# Patient Record
Sex: FEMALE | Race: WHITE | Employment: UNEMPLOYED | URBAN - METROPOLITAN AREA
[De-identification: names, ages, dates, MRNs, and addresses within clinical notes are randomized per-mention and may not be internally consistent; named-entity substitution may affect disease eponyms.]

---

## 2017-12-26 ENCOUNTER — HOSPITAL ENCOUNTER (OUTPATIENT)
Dept: GENERAL RADIOLOGY | Age: 37
Discharge: HOME | End: 2017-12-26
Attending: PHYSICAL MEDICINE & REHABILITATION
Payer: MEDICAID

## 2017-12-26 DIAGNOSIS — M54.50 LUMBAGO: ICD-10-CM

## 2017-12-26 PROCEDURE — 73502 X-RAY EXAM HIP UNI 2-3 VIEWS: CPT

## 2017-12-26 PROCEDURE — 72110 X-RAY EXAM L-2 SPINE 4/>VWS: CPT

## 2021-05-20 ENCOUNTER — HOSPITAL ENCOUNTER (INPATIENT)
Age: 41
LOS: 5 days | Discharge: HOME HEALTH | DRG: 283 | End: 2021-05-25
Attending: HOSPITALIST | Admitting: FAMILY MEDICINE
Payer: MEDICAID

## 2021-05-20 DIAGNOSIS — B17.9 ACUTE HEPATITIS: ICD-10-CM

## 2021-05-20 DIAGNOSIS — B17.10 ACUTE HEPATITIS C VIRUS INFECTION WITHOUT HEPATIC COMA: ICD-10-CM

## 2021-05-20 DIAGNOSIS — R10.11 RIGHT UPPER QUADRANT ABDOMINAL PAIN: ICD-10-CM

## 2021-05-20 DIAGNOSIS — R17 JAUNDICE: ICD-10-CM

## 2021-05-20 DIAGNOSIS — R10.10 PAIN OF UPPER ABDOMEN: Primary | ICD-10-CM

## 2021-05-20 DIAGNOSIS — D68.9 COAGULOPATHY (HCC): ICD-10-CM

## 2021-05-20 PROCEDURE — 65660000000 HC RM CCU STEPDOWN

## 2021-05-20 RX ORDER — DEXTROAMPHETAMINE SACCHARATE, AMPHETAMINE ASPARTATE MONOHYDRATE, DEXTROAMPHETAMINE SULFATE AND AMPHETAMINE SULFATE 2.5; 2.5; 2.5; 2.5 MG/1; MG/1; MG/1; MG/1
10 CAPSULE, EXTENDED RELEASE ORAL DAILY
COMMUNITY

## 2021-05-20 RX ORDER — BUSPIRONE HYDROCHLORIDE 10 MG/1
10 TABLET ORAL 2 TIMES DAILY
COMMUNITY

## 2021-05-20 RX ORDER — TOPIRAMATE 50 MG/1
TABLET, FILM COATED ORAL
COMMUNITY

## 2021-05-20 RX ORDER — IBUPROFEN 800 MG/1
TABLET ORAL
COMMUNITY

## 2021-05-20 NOTE — PROGRESS NOTES
[place] [facility] TRANSFER - IN REPORT:    Verbal report received from Luis Pennington. on Heather Ace  being received from [facility] ED (unit) for routine progression of care      Report consisted of patients Situation, Background, Assessment and   Recommendations(SBAR). Information from the following report(s) SBAR, Kardex, ED Summary, Intake/Output, MAR and Recent Results was reviewed with the receiving nurse. Opportunity for questions and clarification was provided. Assessment completed upon patients arrival to unit and care assumed. Transport set up for 1800 to bring pt to 603.

## 2021-05-21 ENCOUNTER — APPOINTMENT (OUTPATIENT)
Dept: ULTRASOUND IMAGING | Age: 41
DRG: 283 | End: 2021-05-21
Attending: HOSPITALIST
Payer: MEDICAID

## 2021-05-21 PROBLEM — K72.90 LIVER FAILURE (HCC): Status: ACTIVE | Noted: 2021-05-21

## 2021-05-21 PROBLEM — E43 SEVERE PROTEIN-CALORIE MALNUTRITION (HCC): Status: ACTIVE | Noted: 2021-05-21

## 2021-05-21 LAB
ALBUMIN SERPL-MCNC: 2.1 G/DL (ref 3.5–5)
ALBUMIN/GLOB SERPL: 0.6 {RATIO} (ref 1.1–2.2)
ALP SERPL-CCNC: 146 U/L (ref 45–117)
ALT SERPL-CCNC: 3483 U/L (ref 12–78)
ANION GAP SERPL CALC-SCNC: 8 MMOL/L (ref 5–15)
APAP SERPL-MCNC: <2 UG/ML (ref 10–30)
AST SERPL-CCNC: >2000 U/L (ref 15–37)
ATRIAL RATE: 62 BPM
BASOPHILS # BLD: 0 K/UL (ref 0–0.1)
BASOPHILS # BLD: 0 K/UL (ref 0–0.1)
BASOPHILS NFR BLD: 0 % (ref 0–1)
BASOPHILS NFR BLD: 1 % (ref 0–1)
BILIRUB SERPL-MCNC: 11.4 MG/DL (ref 0.2–1)
BUN SERPL-MCNC: 8 MG/DL (ref 6–20)
BUN/CREAT SERPL: 10 (ref 12–20)
CALCIUM SERPL-MCNC: 8 MG/DL (ref 8.5–10.1)
CALCULATED P AXIS, ECG09: 21 DEGREES
CALCULATED R AXIS, ECG10: 47 DEGREES
CALCULATED T AXIS, ECG11: 39 DEGREES
CHLORIDE SERPL-SCNC: 104 MMOL/L (ref 97–108)
CO2 SERPL-SCNC: 25 MMOL/L (ref 21–32)
CREAT SERPL-MCNC: 0.82 MG/DL (ref 0.55–1.02)
DIAGNOSIS, 93000: NORMAL
DIFFERENTIAL METHOD BLD: ABNORMAL
DIFFERENTIAL METHOD BLD: ABNORMAL
EOSINOPHIL # BLD: 0 K/UL (ref 0–0.4)
EOSINOPHIL # BLD: 0.1 K/UL (ref 0–0.4)
EOSINOPHIL NFR BLD: 0 % (ref 0–7)
EOSINOPHIL NFR BLD: 3 % (ref 0–7)
ERYTHROCYTE [DISTWIDTH] IN BLOOD BY AUTOMATED COUNT: 13.9 % (ref 11.5–14.5)
ERYTHROCYTE [DISTWIDTH] IN BLOOD BY AUTOMATED COUNT: 14.9 % (ref 11.5–14.5)
GLOBULIN SER CALC-MCNC: 3.5 G/DL (ref 2–4)
GLUCOSE SERPL-MCNC: 59 MG/DL (ref 65–100)
HCT VFR BLD AUTO: 35.1 % (ref 35–47)
HCT VFR BLD AUTO: 37 % (ref 35–47)
HGB BLD-MCNC: 11.5 G/DL (ref 11.5–16)
HGB BLD-MCNC: 12.2 G/DL (ref 11.5–16)
IMM GRANULOCYTES # BLD AUTO: 0 K/UL
IMM GRANULOCYTES # BLD AUTO: 0 K/UL
IMM GRANULOCYTES NFR BLD AUTO: 0 %
IMM GRANULOCYTES NFR BLD AUTO: 0 %
INR PPP: 1.8 (ref 0.9–1.1)
LYMPHOCYTES # BLD: 0.6 K/UL (ref 0.8–3.5)
LYMPHOCYTES # BLD: 1.8 K/UL (ref 0.8–3.5)
LYMPHOCYTES NFR BLD: 15 % (ref 12–49)
LYMPHOCYTES NFR BLD: 43 % (ref 12–49)
MCH RBC QN AUTO: 30.6 PG (ref 26–34)
MCH RBC QN AUTO: 30.9 PG (ref 26–34)
MCHC RBC AUTO-ENTMCNC: 32.8 G/DL (ref 30–36.5)
MCHC RBC AUTO-ENTMCNC: 33 G/DL (ref 30–36.5)
MCV RBC AUTO: 92.7 FL (ref 80–99)
MCV RBC AUTO: 94.4 FL (ref 80–99)
MONOCYTES # BLD: 0.2 K/UL (ref 0–1)
MONOCYTES # BLD: 0.5 K/UL (ref 0–1)
MONOCYTES NFR BLD: 12 % (ref 5–13)
MONOCYTES NFR BLD: 5 % (ref 5–13)
NEUTS BAND NFR BLD MANUAL: 2 % (ref 0–6)
NEUTS BAND NFR BLD MANUAL: 6 % (ref 0–6)
NEUTS SEG # BLD: 1.6 K/UL (ref 1.8–8)
NEUTS SEG # BLD: 2.9 K/UL (ref 1.8–8)
NEUTS SEG NFR BLD: 35 % (ref 32–75)
NEUTS SEG NFR BLD: 78 % (ref 32–75)
NRBC # BLD: 0 K/UL (ref 0–0.01)
NRBC # BLD: 0 K/UL (ref 0–0.01)
NRBC BLD-RTO: 0 PER 100 WBC
NRBC BLD-RTO: 0 PER 100 WBC
P-R INTERVAL, ECG05: 168 MS
PLATELET # BLD AUTO: 157 K/UL (ref 150–400)
PLATELET # BLD AUTO: 160 K/UL (ref 150–400)
PMV BLD AUTO: 11.5 FL (ref 8.9–12.9)
PMV BLD AUTO: 11.6 FL (ref 8.9–12.9)
POTASSIUM SERPL-SCNC: 3.3 MMOL/L (ref 3.5–5.1)
PROT SERPL-MCNC: 5.6 G/DL (ref 6.4–8.2)
PROTHROMBIN TIME: 18.7 SEC (ref 9–11.1)
Q-T INTERVAL, ECG07: 480 MS
QRS DURATION, ECG06: 90 MS
QTC CALCULATION (BEZET), ECG08: 487 MS
RBC # BLD AUTO: 3.72 M/UL (ref 3.8–5.2)
RBC # BLD AUTO: 3.99 M/UL (ref 3.8–5.2)
RBC MORPH BLD: ABNORMAL
RBC MORPH BLD: ABNORMAL
SODIUM SERPL-SCNC: 137 MMOL/L (ref 136–145)
VENTRICULAR RATE, ECG03: 62 BPM
WBC # BLD AUTO: 3.7 K/UL (ref 3.6–11)
WBC # BLD AUTO: 4 K/UL (ref 3.6–11)

## 2021-05-21 PROCEDURE — 85025 COMPLETE CBC W/AUTO DIFF WBC: CPT

## 2021-05-21 PROCEDURE — 74011250636 HC RX REV CODE- 250/636: Performed by: HOSPITALIST

## 2021-05-21 PROCEDURE — 74011000258 HC RX REV CODE- 258: Performed by: HOSPITALIST

## 2021-05-21 PROCEDURE — 36415 COLL VENOUS BLD VENIPUNCTURE: CPT

## 2021-05-21 PROCEDURE — 76700 US EXAM ABDOM COMPLETE: CPT

## 2021-05-21 PROCEDURE — 80053 COMPREHEN METABOLIC PANEL: CPT

## 2021-05-21 PROCEDURE — 65660000000 HC RM CCU STEPDOWN

## 2021-05-21 PROCEDURE — 74011250637 HC RX REV CODE- 250/637: Performed by: HOSPITALIST

## 2021-05-21 PROCEDURE — 74011250637 HC RX REV CODE- 250/637: Performed by: NURSE PRACTITIONER

## 2021-05-21 PROCEDURE — 85610 PROTHROMBIN TIME: CPT

## 2021-05-21 PROCEDURE — 93005 ELECTROCARDIOGRAM TRACING: CPT

## 2021-05-21 PROCEDURE — 99223 1ST HOSP IP/OBS HIGH 75: CPT | Performed by: HOSPITALIST

## 2021-05-21 PROCEDURE — 74011250636 HC RX REV CODE- 250/636: Performed by: FAMILY MEDICINE

## 2021-05-21 PROCEDURE — P9047 ALBUMIN (HUMAN), 25%, 50ML: HCPCS | Performed by: HOSPITALIST

## 2021-05-21 PROCEDURE — 80143 DRUG ASSAY ACETAMINOPHEN: CPT

## 2021-05-21 RX ORDER — SODIUM CHLORIDE 0.9 % (FLUSH) 0.9 %
5-40 SYRINGE (ML) INJECTION EVERY 8 HOURS
Status: DISCONTINUED | OUTPATIENT
Start: 2021-05-21 | End: 2021-05-25 | Stop reason: HOSPADM

## 2021-05-21 RX ORDER — SODIUM CHLORIDE 0.9 % (FLUSH) 0.9 %
5-40 SYRINGE (ML) INJECTION AS NEEDED
Status: DISCONTINUED | OUTPATIENT
Start: 2021-05-21 | End: 2021-05-25 | Stop reason: HOSPADM

## 2021-05-21 RX ORDER — POTASSIUM CHLORIDE 750 MG/1
40 TABLET, FILM COATED, EXTENDED RELEASE ORAL
Status: COMPLETED | OUTPATIENT
Start: 2021-05-21 | End: 2021-05-21

## 2021-05-21 RX ORDER — ONDANSETRON 2 MG/ML
4 INJECTION INTRAMUSCULAR; INTRAVENOUS
Status: DISCONTINUED | OUTPATIENT
Start: 2021-05-21 | End: 2021-05-25 | Stop reason: HOSPADM

## 2021-05-21 RX ORDER — TRAMADOL HYDROCHLORIDE 50 MG/1
50 TABLET ORAL
Status: DISCONTINUED | OUTPATIENT
Start: 2021-05-21 | End: 2021-05-25 | Stop reason: HOSPADM

## 2021-05-21 RX ORDER — KETOROLAC TROMETHAMINE 30 MG/ML
30 INJECTION, SOLUTION INTRAMUSCULAR; INTRAVENOUS
Status: DISCONTINUED | OUTPATIENT
Start: 2021-05-21 | End: 2021-05-22

## 2021-05-21 RX ORDER — ALBUMIN HUMAN 250 G/1000ML
12.5 SOLUTION INTRAVENOUS EVERY 6 HOURS
Status: COMPLETED | OUTPATIENT
Start: 2021-05-21 | End: 2021-05-22

## 2021-05-21 RX ORDER — GABAPENTIN 400 MG/1
1200 CAPSULE ORAL 3 TIMES DAILY
Status: DISCONTINUED | OUTPATIENT
Start: 2021-05-21 | End: 2021-05-25 | Stop reason: HOSPADM

## 2021-05-21 RX ORDER — ONDANSETRON 4 MG/1
4 TABLET, ORALLY DISINTEGRATING ORAL
Status: DISCONTINUED | OUTPATIENT
Start: 2021-05-21 | End: 2021-05-25 | Stop reason: HOSPADM

## 2021-05-21 RX ORDER — TOPIRAMATE 25 MG/1
50 TABLET ORAL
Status: DISCONTINUED | OUTPATIENT
Start: 2021-05-21 | End: 2021-05-25 | Stop reason: HOSPADM

## 2021-05-21 RX ADMIN — PHYTONADIONE 10 MG: 10 INJECTION, EMULSION INTRAMUSCULAR; INTRAVENOUS; SUBCUTANEOUS at 10:05

## 2021-05-21 RX ADMIN — ALBUMIN (HUMAN) 12.5 G: 0.25 INJECTION, SOLUTION INTRAVENOUS at 12:15

## 2021-05-21 RX ADMIN — KETOROLAC TROMETHAMINE 30 MG: 30 INJECTION, SOLUTION INTRAMUSCULAR; INTRAVENOUS at 22:29

## 2021-05-21 RX ADMIN — KETOROLAC TROMETHAMINE 30 MG: 30 INJECTION, SOLUTION INTRAMUSCULAR; INTRAVENOUS at 10:05

## 2021-05-21 RX ADMIN — KETOROLAC TROMETHAMINE 30 MG: 30 INJECTION, SOLUTION INTRAMUSCULAR; INTRAVENOUS at 16:36

## 2021-05-21 RX ADMIN — ALBUMIN (HUMAN) 12.5 G: 0.25 INJECTION, SOLUTION INTRAVENOUS at 23:31

## 2021-05-21 RX ADMIN — ACETYLCYSTEINE 11180 MG: 200 INJECTION, SOLUTION INTRAVENOUS at 12:45

## 2021-05-21 RX ADMIN — ALBUMIN (HUMAN) 12.5 G: 0.25 INJECTION, SOLUTION INTRAVENOUS at 18:53

## 2021-05-21 RX ADMIN — GABAPENTIN 1200 MG: 400 CAPSULE ORAL at 21:24

## 2021-05-21 RX ADMIN — ONDANSETRON 4 MG: 2 INJECTION INTRAMUSCULAR; INTRAVENOUS at 04:20

## 2021-05-21 RX ADMIN — Medication 10 ML: at 21:25

## 2021-05-21 RX ADMIN — TOPIRAMATE 50 MG: 25 TABLET, FILM COATED ORAL at 21:25

## 2021-05-21 RX ADMIN — POTASSIUM CHLORIDE 40 MEQ: 750 TABLET, EXTENDED RELEASE ORAL at 16:01

## 2021-05-21 RX ADMIN — Medication 10 ML: at 16:02

## 2021-05-21 NOTE — PROGRESS NOTES
[address] [facility] Adult  Hospitalist Group                                                                                          Hospitalist Progress Note  Mj Morillo MD  Answering service: [phone] from in house phone        Date of Service:  2021  NAME:  Kadie Jara  :  1980  MRN:  [id number]      Admission Summary:   Kadie Jara is a 41 y.o. female with pmhx hep C cirrhosis, depression, GERD, dyslipidemia and PCOS who presents as a transfer from [facility] for liver failure. She reports 4 days of RUQ/epigastric pain associated with nausea and vomiting. She was seen at [facility], and found to have markedly elevated LFTs. She was treated with NAC, and vitamin K, and transferred to [facility] for hepatology evaluation. She denies ETOH, dietary supplements, rec drug use, or acetaminophen use. She does have a hx chronic hepatitis, and  hepatitis C that she reports was cured. She was followed by Dr. Donald Burgess with last visits documented in .       Interval history / Subjective:   Patient seen and examined going for ultrasonogram of liver patient complains of pain  Toradol ordered     Assessment & Plan:     #Hepatitis  #Acute Liver Failure  -unknown etiology, ETOH, acetaminophen, UDS negative, denies use of supplements  -hx hep C -/autoimmune work-up  -CT abdomen/pelvis with steatosis, hepatitis, splenomegaly and peripancreatic and portacaval lymphadenopathy getting ultrasound today  -Not sure if patient will need an acetylcysteine there is no history of Tylenol ingestion in excess acetaminophen level less than 2 , s/p IV vitamin K  -viral hepatitis panel pending  -daily CBC, CMP, and INR  -hepatology consulted     #Anxiety  #Neuropathic pain  #chronic pain  -need med rec  -Toradol every 6 hours as needed for now    Code status: Full  DVT prophylaxis: SCD    Care Plan discussed with: Patient/Family and Nurse  Anticipated Disposition: Home w/Family  Anticipated Discharge: 24 hours to 48 hours     Hospital Problems  Date Reviewed: 5/13/2016        Codes Class Noted POA    Liver failure [facility]) ICD-10-CM: K72.90  ICD-9-CM: 572.8  5/21/2021 Unknown                Review of Systems:   A comprehensive review of systems was negative. Vital Signs:    Last 24hrs VS reviewed since prior progress note. Most recent are:  Visit Vitals  BP (!) 95/59   Pulse 63   Temp 97.5 °F (36.4 °C)   Resp 17   Ht 5' 7\" (1.702 m)   Wt 74.5 kg (164 lb 3.2 oz)   SpO2 90%   BMI 25.72 kg/m²       No intake or output data in the 24 hours ending 05/21/21 1145     Physical Examination:     I had a face to face encounter with this patient and independently examined them on 5/21/2021 as outlined below:          Constitutional:  No acute distress, complaining of right upper quadrant pain tattoos all over   ENT:  Oral mucosa moist,    Resp:  CTA bilaterally. CV:  Regular rhythm, normal rate    GI:  Soft, non distended, non tender. bs+    Musculoskeletal:  No edema, warm, 2+ pulses throughout    Neurologic:  Moves all extremities. AAOx3, CN II-XII reviewed            Data Review:    I personally reviewed  Image and labs    No results found. Labs:     Recent Labs     05/21/21  1004 05/21/21  0248   WBC 3.7 4.0   HGB 11.5 12.2   HCT 35.1 37.0    160     Recent Labs     05/21/21  0248      K 3.3*      CO2 25   BUN 8   CREA 0.82   GLU 59*   CA 8.0*     Recent Labs     05/21/21  0248   ALT 3,483*   *   TBILI 11.4*   TP 5.6*   ALB 2.1*   GLOB 3.5     Recent Labs     05/21/21  0248   INR 1.8*   PTP 18.7*      No results for input(s): FE, TIBC, PSAT, FERR in the last 72 hours. No results found for: FOL, RBCF   No results for input(s): PH, PCO2, PO2 in the last 72 hours. No results for input(s): CPK, CKNDX, TROIQ in the last 72 hours.     No lab exists for component: CPKMB  No results found for: CHOL, CHOLX, CHLST, CHOLV, HDL, HDLP, LDL, LDLC, DLDLP, TGLX, TRIGL, TRIGP, CHHD, CHHDX  Lab Results Component Value Date/Time    Glucose (POC) 102 (H) 05/26/2015 05:49 PM    Glucose (POC) 88 05/26/2015 06:01 AM    Glucose (POC) 122 (H) 05/26/2015 12:03 AM    Glucose (POC) 123 (H) 05/25/2015 05:11 PM    Glucose (POC) 85 05/25/2015 11:12 AM     No results found for: COLOR, APPRN, SPGRU, REFSG, GARCIA, PROTU, GLUCU, KETU, BILU, UROU, FABIANA, LEUKU, GLUKE, EPSU, BACTU, WBCU, RBCU, CASTS, UCRY      Medications Reviewed:     Current Facility-Administered Medications   Medication Dose Route Frequency    sodium chloride (NS) flush 5-40 mL  5-40 mL IntraVENous Q8H    sodium chloride (NS) flush 5-40 mL  5-40 mL IntraVENous PRN    ondansetron (ZOFRAN ODT) tablet 4 mg  4 mg Oral Q8H PRN    Or    ondansetron (ZOFRAN) injection 4 mg  4 mg IntraVENous Q6H PRN    phytonadione (vitamin K1) (AQUA-MEPHYTON) 10 mg in 0.9% sodium chloride 50 mL IVPB  10 mg IntraVENous DAILY    ketorolac (TORADOL) injection 30 mg  30 mg IntraVENous Q6H PRN    albumin human 25% (BUMINATE) solution 12.5 g  12.5 g IntraVENous Q6H     ______________________________________________________________________  EXPECTED LENGTH OF STAY: - - -  ACTUAL LENGTH OF STAY:          1                 Mj Morillo MD

## 2021-05-21 NOTE — CONSULTS
Sosa Martin MD, Kaylee Rushing MD, MPH      Roberto Cheney, YOBANY Phan, [facility]     Cristina King, [facility]   Deisy Garsia, THNAIA-    Sarah Alexys, [facility]       Zayfatoumata Walter [facility] 136    at [place] [address], [address] Ina Thompson  22.    [phone]    FAX: [address] [place] [address], [address]    [phone]    FAX: [phone]         HEPATOLOGY CONSULT NOTE  The patient is well known to me and regularly cared for at [address] [place] [address]. I was asked to see this patient in consultation by Dr Rae Castillo for management of Acute liver failure. I have reviewed the Emergency room note, Hospital admission note,  Notes by all other physicians who have seen the patient during this hospitalization to date. I have reviewed the problem list and the reason for this hospitalization. I have reviewed the allergies and the medications the patient was taking at home prior to this hospitalization. HISTORY:  Ben Olivia is a 41year old female with history of chronic HCV. She was initially diagnosed with HCV in 2015. HCV was not treated because it spontaneously resolved    She presented to OSH for RUQ pain associated with nausea and vomiting. She was noted to have markedly elevated LFT's and was started on IV NAC. She received I.V vitamin K.    CT of the abdomen /pelvis performed at [facility] showed hepatitis, steatosis, splenomegaly, cholecystectomy, and peripancreatic and portacaval lymph nodes likley related to hepatitis. Lab work performed here at [facility] showed ALT 3,483, AST > 2000, .  INR 1.8    She remains on I.V NAC and zofran    She still reports abdominal pain, severe nausea and vomiting  She denies IVD abuse or alcohol use. She denies using acetaminophen based medications. She denies use of herbal medication.       ASSESSMENT AND PLAN:  Acute hepatitis  Unclear aetiology of acute hepatitis  Transaminases are in the 2000- 3000 range, ALP is minimally elevated, Total bilirubin 11.4, liver function is depressed, the platelet count is normal. INR 1.8    She has a history of prior HCV that was never treated    She is on I.V NAC  Continue I.V NAC until Transanminases are < 800's  Monitor closely for altered mental status  NO opioids, or sedating medications  We will check serologies including viral hepatitis, autoimmune markers, cerulopasmin  Check Urine toxicology screen    Coagulopathy  Check PT/INR every 8 hrs for now  Start I.V vitamin V 10 mg daily for 3 days    History of HCV infection  This was never treated  We will check HCV RNA viral load    Bleeding: No current evidence of bleeding. Hepatic encephalopathy prophylaxis:  -Monitor electrolytes, specifically goal K> 4 as hypokalemia can lead to increased ammonia production. -Minimize opiates, benzodiazepines, and other sedating/constipating medications. Nausea and Vomiting  Continue zofran as needed    Thank you for this consult. Please do not hesitate to call with questions or concerns      SYSTEM REVIEW:  Constitution systems: Negative for fever, chills, weight gain, weight loss. Eyes: Negative for visual changes. ENT: Negative for sore throat, painful swallowing. Respiratory: Negative for cough, hemoptysis, SOB. Cardiology: Negative for chest pain, palpitations. GI:  Negative for constipation or diarrhea. RUQ pain  : Negative for urinary frequency, dysuria, hematuria, nocturia. Skin: Negative for rash. Hematology: Negative for easy bruising, blood clots. Musculo-skelatal: Negative for back pain, muscle pain, weakness.   Neurologic: Negative for headaches, dizziness, vertigo, memory problems not related to HE.  Psychology: Negative for anxiety, depression. FAMILY HISTORY:  The father  of heart disease. The mother is alive and healthy. There is no family history of liver disease.       SOCIAL HISTORY:  The patient is . The spouse has been tested for HCV and is negative. The patient has 3 children,   The patient currently smokes half pack of tobacco daily. The patient has never consumed significant amounts of alcohol. The patient does not work. PHYSICAL EXAMINATION:  VS: per nursing note  General: Acutely Ill looking  Eyes: Sclera anicteric. ENT: No oral lesions. Thyroid normal.  Nodes: No adenopathy. Skin: No spider angiomata. No jaundice. No palmar erythema. Respiratory: Lungs clear to auscultation. Cardiovascular: Regular heart rate. No murmurs. No JVD. Abdomen: Soft, RUQ tenderness. Liver size normal to percussion/palpation. Spleen not palpable. No obvious ascites. Extremities: No edema. No muscle wasting. No gross arthritic changes. Neurologic: Alert and oriented. Cranial nerves grossly intact. No asterixis.     LABORATORY:  Results for Tomasa Mancera (MRN [id number]) as of 2021 07:51   Ref.  Range 2021 02:48   WBC Latest Ref Range: 3.6 - 11.0 K/uL 4.0   HGB Latest Ref Range: 11.5 - 16.0 g/dL 12.2   PLATELET Latest Ref Range: 150 - 400 K/uL 160   IMMATURE GRANULOCYTES Latest Units: % 0   INR Latest Ref Range: 0.9 - 1.1   1.8 (H)   Prothrombin time Latest Ref Range: 9.0 - 11.1 sec 18.7 (H)   Sodium Latest Ref Range: 136 - 145 mmol/L 137   Potassium Latest Ref Range: 3.5 - 5.1 mmol/L 3.3 (L)   Chloride Latest Ref Range: 97 - 108 mmol/L 104   CO2 Latest Ref Range: 21 - 32 mmol/L 25   Anion gap Latest Ref Range: 5 - 15 mmol/L 8   Glucose Latest Ref Range: 65 - 100 mg/dL 59 (L)   BUN Latest Ref Range: 6 - 20 MG/DL 8   Creatinine Latest Ref Range: 0.55 - 1.02 MG/DL 0.82   Bilirubin, total Latest Ref Range: 0.2 - 1.0 MG/DL 11.4 (H)   Protein, total Latest Ref Range: 6.4 - 8.2 g/dL 5.6 (L)   Albumin Latest Ref Range: 3.5 - 5.0 g/dL 2.1 (L)   ALT Latest Ref Range: 12 - 78 U/L 3,483 (H)   AST Latest Ref Range: 15 - 37 U/L >2,000 (H)   Alk.  phosphatase Latest Ref Range: 45 - 117 U/L 146 (H)   Acetaminophen level Latest Ref Range: 10 - 30 ug/mL <2 (L)       RADIOLOGY:      Selina Schrader MD, MPH  Advanced Hepatology  [facility] [address] [facility] [address].  [phone]  [address] [place] [address]

## 2021-05-21 NOTE — H&P
[address] [facility] Adult  Hospitalist Group  History and Physical    Primary Care Provider: Von Cruz MD  Date of Service:  5/21/2021    Subjective:     Monica Ibarra is a 41 y.o. female with pmhx hep C cirrhosis, depression, GERD, dyslipidemia and PCOS who presents as a transfer from [facility] for liver failure. She reports 4 days of RUQ/epigastric pain associated with nausea and vomiting. She was seen at [facility], and found to have markedly elevated LFTs. She was treated with NAC, and vitamin K, and transferred to [facility] for hepatology evaluation. She denies ETOH, dietary supplements, rec drug use, or acetaminophen use. She does have a hx chronic hepatitis, and  hepatitis C that she reports was cured. She was followed by Dr. Ale Salmon with last visits documented in 2015. Upon arrival to floor, her VSS. Initial labs at [facility] showed marked transaminitis with AST >2,000, and ALT 3.483, T bili 11.4, AlkP 146, and albumin 2.1. ETOH and UDS were negative and acetaminophen level was negative. CT abdomen/pelvis done at [facility] showed hepatitis, steatosis, splenomegaly, cholecystectomy, and peripancreatic and portacaval lymph nodes likley related to hepatitis. She has received dilaudid for pain, NAC, and IV vitamin K. Review of Systems:    All other review of systems were negative except for that written in the History of Present Illness.      Past Medical History:   Diagnosis Date    Arthritis     back- DDD    Belching     Cancer ([facility] [address]     advanced lesions on cervix, no surgery    Chronic pain     back pain, untreated    Cirrhosis ([address]     Depression     GERD (gastroesophageal reflux disease)     treats with baking soda and water    Headache     Hypercholesterolemia     Ill-defined condition     polycystic ovary disease    Liver disease 2015    Hep C    Nausea & vomiting     Psychiatric disorder     PTSD from childhood trauma    Sciatica     bilateral    Scoliosis     Swallowing difficulty     Thyroid disease     \"slow\"    TMJ pain dysfunction syndrome     Vomiting blood       Past Surgical History:   Procedure Laterality Date    CARDIAC SURG PROCEDURE UNLIST      heart cath 2002 or 2003, [facility] per patient- [facility]    HX GI      cholecystectomy  1997    HX GYN      D & C's x 3 last one 1998    HX HEENT      27 teeth removed,  for dentures    HX HYSTERECTOMY      HX ORTHOPAEDIC      spinal tumor removed from spine 8th grade    HX UROLOGICAL      2001 Tubal ligation    UPPER GI ENDOSCOPY,BIOPSY  9/17/2015         UPPER GI ENDOSCOPY,DILATN W GUIDE  9/17/2015          Prior to Admission medications    Medication Sig Start Date End Date Taking? Authorizing Provider   amphetamine-dextroamphetamine XR (Adderall XR) 10 mg XR capsule Take 10 mg by mouth daily. Yes Provider, Historical   topiramate (Topamax) 50 mg tablet Take  by mouth nightly. Yes Provider, Historical   busPIRone (BUSPAR) 10 mg tablet Take 10 mg by mouth two (2) times a day. Yes Provider, Historical   ibuprofen (MOTRIN) 800 mg tablet Take  by mouth two (2) times daily as needed for Pain. Indications: pain   Yes Provider, Historical   peg-electrolyte soln (GAVILYTE-N) 420 gram solution Take as directed  Indications: BOWEL EVACUATION 5/13/16   Geoff Givens MD   gabapentin (NEURONTIN) 100 mg capsule Take 1,200 mg by mouth three (3) times daily. Indications: neuropathic pain    Provider, Historical   Omeprazole delayed release (PRILOSEC D/R) 20 mg tablet Take two tabs daily  Indications: GASTROESOPHAGEAL REFLUX 4/26/16   Geoff Givens MD   traMADol (ULTRAM) 50 mg tablet Take 1 Tab by mouth two (2) times daily as needed for Pain. Max Daily Amount: 100 mg.  Indications: PAIN  Patient not taking: Reported on 5/20/2021 3/22/16   Cristina Isaacs G, NP     Allergies   Allergen Reactions    Levaquin [Levofloxacin] Hives     Pt broke out in hives when given Levaquin first dose at [address] Hives    Lithium Other (comments)     Aggitation, suicidal thoughts. All antidepressants that she has been given.  Morphine Hives    Rice Other (comments)    Rocephin [Ceftriaxone] Other (comments)     Fevers per pt      Tylenol [Acetaminophen] Hives     Shuts liver down    Valium [Diazepam] Nausea and Vomiting      Family History   Problem Relation Age of Onset    Heart Disease Father     Colon Cancer Father     Heart Disease Maternal Grandmother     Cancer Maternal Grandmother         melanoma    Heart Disease Maternal Grandfather     Cancer Paternal Grandmother         breast thyroid        SOCIAL HISTORY:  Patient resides at Home. Patient ambulates independently  Smoking history: current smoker  Alcohol history: none    Objective:       Physical Exam:   Visit Vitals  BP (!) 101/59 (BP 1 Location: Left arm, BP Patient Position: At rest)   Pulse 64   Temp 97.9 °F (36.6 °C)   Resp 16   Ht 5' 7\" (1.702 m)   Wt 74.5 kg (164 lb 3.2 oz)   SpO2 96%   BMI 25.72 kg/m²     General:  Alert, cooperative, no distress, appears stated age. Head:  Normocephalic, without obvious abnormality, atraumatic. Eyes:  Conjunctivae/corneas clear. EOMs intact. Throat: Lips, mucosa, and tongue normal.   Neck: Supple, symmetrical, trachea midline, no adenopathy, thyroid: no enlargement/tenderness/nodules, no carotid bruit and no JVD. Back:   Symmetric, no curvature. ROM normal. No CVA tenderness. Lungs:   Clear to auscultation bilaterally. Chest wall:  No tenderness or deformity. Heart:  Regular rate and rhythm, S1, S2 normal, no murmur, click, rub or gallop. Abdomen:   Soft,RUQ tenderness and gauridng. Bowel sounds normal. No masses,  No organomegaly. Extremities: Extremities normal, atraumatic,    Pulses: 2+ radial pulses   Skin: Skin color, texture, turgor normal. No rashes or lesions. Jaundice       Neurologic: CNII-XII intact. Normal strength, sensation and reflexes throughout. EKG, ordered, pending    Data Review: All diagnostic labs and studies have been reviewed.     Assessment:     Active Problems:    Liver failure ([facility] [address] (5/21/2021)        Plan:     #Hepatitis  #Acute Liver Failure  -unknown etiology, ETOH, acetaminophen, UDS negative, denies use of supplements  -hx hep C s/p ?treatment  -CT abdomen/pelvis with steatosis, hepatitis, splenomegaly and peripancreatic and portacaval lymphadenopathy  -completing NAC, s/p IV vitamin K  -viral hepatitis panel pending  -daily CBC, CMP, and INR  -hepatology consulted, appreciate rec's    #Anxiety  #Neuropathic pain  #chronic pain  -need med rec    FEN: NPO  dvt ppx: SCD  MPOA: Britney Romo (spouse)>[phone]  Code: Full      FUNCTIONAL STATUS PRIOR TO HOSPITALIZATION Ambulates Independently     Signed By: Wai Shah MD     May 21, 2021

## 2021-05-21 NOTE — PROGRESS NOTES
Transition of Care Plan   RUR- Low  7%   DISPOSITION: TBD/ CM will need to complete initial assessment.  F/U with PCP/Specialist     Transport: CM to follow. Attempted initial assessment, patient was asleep/ resting.     Katt Neumann  2:38 PM

## 2021-05-21 NOTE — PROGRESS NOTES
Comprehensive Nutrition Assessment    Type and Reason for Visit: Initial, Positive nutrition screen      Nutrition Recommendations/Plan:    1. Advance diet as tolerated/ medically feasible per MD ham. Upon diet advancement, pt will require a dental soft diet    2. Will start ONS Ensure Clear with all meals for now    3. Recommend adjusting ONS to Ensure Enlive with meals upon diet advancement    4. Recommend replete K+, monitor BG. 5. Pt meets criteria for severe, protein-calorie malnutrition (POA)    Malnutrition Assessment:  Malnutrition Status:  Severe malnutrition    Context:  Acute illness     Findings of the 6 clinical characteristics of malnutrition:   Energy Intake:  7 - 50% or less of est energy requirements for 5 or more days  Weight Loss:  7.00 - Greater than 7.5% over 3 months     Body Fat Loss:  Unable to assess,     Muscle Mass Loss:  7 - Moderate muscle mass loss, Temples (temporalis)  Fluid Accumulation:  No significant fluid accumulation,     Strength:  Not performed     Nutrition Assessment:     PMHx of chronic pain, HCV - spontaneously resolved in 2015, depression, GERD, Hypercholesterolemia, PCOS, among others. Admitted with acute hepatitis/liver failure. Noted reports of RUQ/epigastric pain associated with nausea and vomiting for past week. Hepatology following. MST received for wt loss and poor appetite. Visited in room. Pt is edentulous, requires a dental soft diet at baseline. Reports minimal PO intake for past several days, but consuming \"way less\" than 50% of usual intake for past couple months now. Reports UBW was around 185 lb, so believes has lost at least 20 lb in past 2 months (11.3% BW). She does exhibit significant temporal wasting, ?buccal fat loss - but difficult to tell with no teeth. She is jaundice. Appears ill and older than reported age. Currently c/o being cold and nauseated.   Wrapped snuggly under covers and did not want to further disturb for extensive NFPE.    She has had Ensure in the past - thinks she liked the strawberry flavor and would be willing to have with meals when diet advanced. She would like the berry Ensure Clear while on clear liquids. She was NPO this AM, but clear liquids for lunch. C/o fullness after consuming just 4 oz of juice and jello. Early satiety, adominal pain, and nausea have precipitated recent decline in PO intake. Discussed/ encouraged smaller, more frequent meals/ snacks high in kcal and protein. Pt has a listed allergy to rice. She states she is not allergic, but cannot swallow rice. It gets caught in throat and causes her to choke. Even without NFPE, pt meets criteria for acute, severe protein-calorie malnutrition. Noted low K+ and Glu this AM.  Hopefully BG will improvement with diet advancement. If NPO again, may require some D5. Recommend repletion of K+, discussed with RN. Nutritionally Significant Medications: Toradol, Zofran, Vit K, Buminate      Estimated Daily Nutrient Needs:  Energy (kcal): 7515-4010 (25-30 kcal/kg or MSJ x 1.3-1.5)  Protein (g):  (1.2-1.5 gm/kg)  Fluid (ml/day): 1 mL/kcal  Weight used: 74.5 kg (admission, standing)    Nutrition Related Findings:   Edema: none  Last BM: PTA      Wounds:    None       Current Nutrition Therapies:  Diet: clear liquids  Supplements: none at this time  Meal intake: No data found. Supplement intake: No data found. Anthropometric Measures:  · Height:  5' 7\" (170.2 cm)  · Current Body Wt:  74.5 kg (164 lb 3.2 oz)   · Admission Body Wt:  164 lb 3.2 oz (74.5 kg - standing scale)    · Usual Body Wt:  83.9 kg (185 lb)     · Ideal Body Wt:  135 lbs:  121.6 %   · BMI Category: Overweight (BMI 25.0-29. 9)       Wt Readings from Last 20 Encounters:   05/20/21 74.5 kg (164 lb 3.2 oz)   05/12/16 96.2 kg (212 lb)   04/26/16 96.2 kg (212 lb)   01/14/16 91.2 kg (201 lb)   10/28/15 93.6 kg (206 lb 6 oz)   10/08/15 81.6 kg (180 lb)   10/02/15 81.6 kg (180 lb) 09/17/15 86.2 kg (190 lb)   08/29/15 92.4 kg (203 lb 11.3 oz)   08/26/15 87.1 kg (192 lb)   08/24/15 86.2 kg (190 lb)   05/23/15 95.7 kg (211 lb)       Nutrition Diagnosis:   · Inadequate oral intake related to altered GI function, early satiety as evidenced by weight loss, poor intake prior to admission, moderate muscle loss      Nutrition Interventions:   Food and/or Nutrient Delivery: Continue current diet, Start oral nutrition supplement  Nutrition Education and Counseling: Counseling initiated  Coordination of Nutrition Care: Continue to monitor while inpatient, Interdisciplinary rounds    Goals:  pt will tolerate diet beyond clear liquids without N/V within 3-4 days       Nutrition Monitoring and Evaluation:   Behavioral-Environmental Outcomes: None identified  Food/Nutrient Intake Outcomes: Food and nutrient intake, Supplement intake, Diet advancement/tolerance  Physical Signs/Symptoms Outcomes: Biochemical data, Nausea/vomiting, Nutrition focused physical findings, Weight    Discharge Planning:    Continue oral nutrition supplement     Recent Labs     05/21/21  0248   GLU 59*   BUN 8   CREA 0.82      K 3.3*      CO2 25   CA 8.0*       Recent Labs     05/21/21  0248   ALT 3,483*   *   TBILI 11.4*   TP 5.6*   ALB 2.1*   GLOB 3.5       No results for input(s): LAC in the last 72 hours. Recent Labs     05/21/21  1004 05/21/21  0248   WBC 3.7 4.0   HGB 11.5 12.2   HCT 35.1 37.0    160       No results for input(s): PREALB in the last 72 hours. No results for input(s): TRIGL in the last 72 hours. No results for input(s): GLUCPOC in the last 72 hours.     No results found for: HBA1C, HGBE8, JXO8YQKT, HRW7BNVB, RIM9NXLB        Kris Andre RD  Available via [employer]

## 2021-05-21 NOTE — PROGRESS NOTES
2240- Patient arrived to floor. Admitting notified. Eltechsve message sent to Dr Melina Forde. Message read. 0020 PerfectServe message sent requesting orders for pain meds and diet. 0110-  asked to page on call MD and said there is no doctor on call for the [facility] at this time. Nursing Supervisor called and suggested we page admitting hospitalist. 
 
Saida Russo - Admitting hospitalist paged. Georgie Mcghee returned page. Admission orders received.

## 2021-05-21 NOTE — PROGRESS NOTES
Physical Therapy Screening:  Services are not indicated at this time. [facility] was triggered for physical therapy based on results obtained during the nursing admission assessment. The patients chart was reviewed and the patient is not appropriate for a skilled therapy evaluation at this time. Please consult physical therapy if any therapy needs arise. Thank you.     Rubio Harper, PT

## 2021-05-21 NOTE — PROGRESS NOTES
2240- Patient arrived to floor. Admitting notified. Hotelementsve message sent to Dr Remy Shoemaker. Message read. 0020 PerfectServe message sent requesting orders for pain meds and diet. 0110-  asked to page on call MD and said there is no doctor on call for the [facility] at this time. Nursing Supervisor called and suggested we page admitting hospitalist. 
 
Wayne Ferraro - Admitting hospitalist paged. Jesu Jones returned page. Admission orders received. [phone] - Dr Davis Jones paged regarding patient's pain medication

## 2021-05-21 NOTE — PROGRESS NOTES
Spiritual Care Assessment/Progress Note  [facility]      NAME: Rose Infante      MRN: [id number]  AGE: 41 y.o. SEX: female  Mormonism Affiliation: [place]   Language: English     5/21/2021           Spiritual Assessment begun in [facility] [place] [facility] through conversation with:         []Patient        [] Family    [] Friend(s)        Reason for Consult: Advance medical directive consult     Spiritual beliefs: (Please include comment if needed)     [] Identifies with a jose francisco tradition:         [] Supported by a jose francisco community:            [] Claims no spiritual orientation:           [] Seeking spiritual identity:                [] Adheres to an individual form of spirituality:           [x] Not able to assess:                           Identified resources for coping:      [] Prayer                               [] Music                  [] Guided Imagery     [] Family/friends                 [] Pet visits     [] Devotional reading                         [] Unknown     [] Other:                                               Interventions offered during this visit: (See comments for more details)    Patient Interventions: Advance medical directive completed, Other (comment) (AMD information left at bedside)           Plan of Care:     [] Support spiritual and/or cultural needs    [] Support AMD and/or advance care planning process      [] Support grieving process   [] Coordinate Rites and/or Rituals    [] Coordination with community clergy   [] No spiritual needs identified at this time   [] Detailed Plan of Care below (See Comments)  [] Make referral to Music Therapy  [] Make referral to Pet Therapy     [] Make referral to Addiction services  [] Make referral to [facility]  [] Make referral to Spiritual Care Partner  [] No future visits requested        [] Follow up upon further referrals     Comments: Attempted to see Ms Denver Mcclelland in room 400 regarding an in-basket request for AMD information.  Patient was not in the room at time of 's arrival. Left a blank AMD form and a copy of the booklet, \"Your Right to Decide\", at patient's bedside with a note encouraging her to notify  if/when she would like to discuss the information. : . Khris Carnes.  Isadora Torres; [facility], to contact [phone] call: [phone]

## 2021-05-21 NOTE — ACP (ADVANCE CARE PLANNING)
Advance Care Planning    Advance Care Planning Note  Ambulatory Spiritual Care Services      Date: 5/20/2021   Received request from  [facility], THE participants:  Patient was not in the room, so no conversation was held    Goals of Conversation:   Conversation not held    Healthcare Decision Maker:     Click here to complete [address] including selection of the Healthcare Decision Maker Relationship (ie \"Primary\")    Today we:    9  See above note)  Advance Care Planning Documents (Patient Wishes) on file:  Currently on file:  None    Assessment:  Patient was not in the room at time of 's arrival. Left a copy of a blank AMD form and a copy of the booklet, \"Your Right to Decide, at the bedside along with a note encouraging her to notify  when she was ready to discuss the information. Interventions:  Left note and AMD materials at bedside for patient's review    Outcomes:   Materials left at bedside with note     Patient/Healthcare Decision Maker Instructions:  Notify  when ready to discuss AMD materials    Electronically signed by Marilin Vickers, [address] [employer] on 5/21/2021 at 10:16 AM

## 2021-05-22 ENCOUNTER — APPOINTMENT (OUTPATIENT)
Dept: CT IMAGING | Age: 41
DRG: 283 | End: 2021-05-22
Attending: HOSPITALIST
Payer: MEDICAID

## 2021-05-22 LAB
ALBUMIN SERPL-MCNC: 2.5 G/DL (ref 3.5–5)
ALBUMIN/GLOB SERPL: 0.6 {RATIO} (ref 1.1–2.2)
ALP SERPL-CCNC: 140 U/L (ref 45–117)
ALT SERPL-CCNC: 2718 U/L (ref 12–78)
ANION GAP SERPL CALC-SCNC: 8 MMOL/L (ref 5–15)
AST SERPL-CCNC: 2000 U/L (ref 15–37)
BASOPHILS # BLD: 0 K/UL (ref 0–0.1)
BASOPHILS NFR BLD: 0 % (ref 0–1)
BILIRUB SERPL-MCNC: 11.6 MG/DL (ref 0.2–1)
BUN SERPL-MCNC: 8 MG/DL (ref 6–20)
BUN/CREAT SERPL: 9 (ref 12–20)
CALCIUM SERPL-MCNC: 8 MG/DL (ref 8.5–10.1)
CHLORIDE SERPL-SCNC: 106 MMOL/L (ref 97–108)
CO2 SERPL-SCNC: 27 MMOL/L (ref 21–32)
CREAT SERPL-MCNC: 0.86 MG/DL (ref 0.55–1.02)
DIFFERENTIAL METHOD BLD: NORMAL
EOSINOPHIL # BLD: 0.1 K/UL (ref 0–0.4)
EOSINOPHIL NFR BLD: 2 % (ref 0–7)
ERYTHROCYTE [DISTWIDTH] IN BLOOD BY AUTOMATED COUNT: 13.9 % (ref 11.5–14.5)
GLOBULIN SER CALC-MCNC: 3.9 G/DL (ref 2–4)
GLUCOSE SERPL-MCNC: 64 MG/DL (ref 65–100)
HBV SURFACE AG SER QL: <0.1 INDEX
HBV SURFACE AG SER QL: NEGATIVE
HCT VFR BLD AUTO: 39.1 % (ref 35–47)
HGB BLD-MCNC: 12.6 G/DL (ref 11.5–16)
IMM GRANULOCYTES # BLD AUTO: 0 K/UL
IMM GRANULOCYTES NFR BLD AUTO: 0 %
INR PPP: 1.9 (ref 0.9–1.1)
LYMPHOCYTES # BLD: 1.9 K/UL (ref 0.8–3.5)
LYMPHOCYTES NFR BLD: 30 % (ref 12–49)
MCH RBC QN AUTO: 30.1 PG (ref 26–34)
MCHC RBC AUTO-ENTMCNC: 32.2 G/DL (ref 30–36.5)
MCV RBC AUTO: 93.5 FL (ref 80–99)
MONOCYTES # BLD: 0.4 K/UL (ref 0–1)
MONOCYTES NFR BLD: 6 % (ref 5–13)
NEUTS SEG # BLD: 3.8 K/UL (ref 1.8–8)
NEUTS SEG NFR BLD: 62 % (ref 32–75)
NRBC # BLD: 0 K/UL (ref 0–0.01)
NRBC BLD-RTO: 0 PER 100 WBC
PLATELET # BLD AUTO: 173 K/UL (ref 150–400)
PMV BLD AUTO: 11 FL (ref 8.9–12.9)
POTASSIUM SERPL-SCNC: 3.5 MMOL/L (ref 3.5–5.1)
PROT SERPL-MCNC: 6.4 G/DL (ref 6.4–8.2)
PROTHROMBIN TIME: 19.2 SEC (ref 9–11.1)
RBC # BLD AUTO: 4.18 M/UL (ref 3.8–5.2)
RBC MORPH BLD: NORMAL
RBC MORPH BLD: NORMAL
SODIUM SERPL-SCNC: 141 MMOL/L (ref 136–145)
WBC # BLD AUTO: 6.2 K/UL (ref 3.6–11)
WBC MORPH BLD: NORMAL

## 2021-05-22 PROCEDURE — 74011000258 HC RX REV CODE- 258: Performed by: HOSPITALIST

## 2021-05-22 PROCEDURE — 74011250637 HC RX REV CODE- 250/637: Performed by: NURSE PRACTITIONER

## 2021-05-22 PROCEDURE — 36415 COLL VENOUS BLD VENIPUNCTURE: CPT

## 2021-05-22 PROCEDURE — 74011250636 HC RX REV CODE- 250/636: Performed by: HOSPITALIST

## 2021-05-22 PROCEDURE — 87340 HEPATITIS B SURFACE AG IA: CPT

## 2021-05-22 PROCEDURE — 99233 SBSQ HOSP IP/OBS HIGH 50: CPT | Performed by: INTERNAL MEDICINE

## 2021-05-22 PROCEDURE — 85610 PROTHROMBIN TIME: CPT

## 2021-05-22 PROCEDURE — 85025 COMPLETE CBC W/AUTO DIFF WBC: CPT

## 2021-05-22 PROCEDURE — 86709 HEPATITIS A IGM ANTIBODY: CPT

## 2021-05-22 PROCEDURE — 80053 COMPREHEN METABOLIC PANEL: CPT

## 2021-05-22 PROCEDURE — 74011000636 HC RX REV CODE- 636: Performed by: RADIOLOGY

## 2021-05-22 PROCEDURE — P9047 ALBUMIN (HUMAN), 25%, 50ML: HCPCS | Performed by: HOSPITALIST

## 2021-05-22 PROCEDURE — 65660000000 HC RM CCU STEPDOWN

## 2021-05-22 PROCEDURE — 74011250636 HC RX REV CODE- 250/636: Performed by: FAMILY MEDICINE

## 2021-05-22 PROCEDURE — 74011250636 HC RX REV CODE- 250/636: Performed by: NURSE PRACTITIONER

## 2021-05-22 PROCEDURE — 74177 CT ABD & PELVIS W/CONTRAST: CPT

## 2021-05-22 RX ORDER — HYDROMORPHONE HYDROCHLORIDE 1 MG/ML
0.5 INJECTION, SOLUTION INTRAMUSCULAR; INTRAVENOUS; SUBCUTANEOUS ONCE
Status: COMPLETED | OUTPATIENT
Start: 2021-05-22 | End: 2021-05-22

## 2021-05-22 RX ORDER — HYDROMORPHONE HYDROCHLORIDE 1 MG/ML
0.5 INJECTION, SOLUTION INTRAMUSCULAR; INTRAVENOUS; SUBCUTANEOUS
Status: DISCONTINUED | OUTPATIENT
Start: 2021-05-22 | End: 2021-05-25

## 2021-05-22 RX ADMIN — KETOROLAC TROMETHAMINE 30 MG: 30 INJECTION, SOLUTION INTRAMUSCULAR; INTRAVENOUS at 06:52

## 2021-05-22 RX ADMIN — TRAMADOL HYDROCHLORIDE 50 MG: 50 TABLET, COATED ORAL at 23:14

## 2021-05-22 RX ADMIN — PROMETHAZINE HYDROCHLORIDE 12.5 MG: 25 INJECTION INTRAMUSCULAR; INTRAVENOUS at 09:48

## 2021-05-22 RX ADMIN — TRAMADOL HYDROCHLORIDE 50 MG: 50 TABLET, COATED ORAL at 16:55

## 2021-05-22 RX ADMIN — TRAMADOL HYDROCHLORIDE 50 MG: 50 TABLET, COATED ORAL at 02:26

## 2021-05-22 RX ADMIN — ONDANSETRON 4 MG: 2 INJECTION INTRAMUSCULAR; INTRAVENOUS at 16:55

## 2021-05-22 RX ADMIN — GABAPENTIN 1200 MG: 400 CAPSULE ORAL at 08:21

## 2021-05-22 RX ADMIN — HYDROMORPHONE HYDROCHLORIDE 0.5 MG: 1 INJECTION, SOLUTION INTRAMUSCULAR; INTRAVENOUS; SUBCUTANEOUS at 21:11

## 2021-05-22 RX ADMIN — PHYTONADIONE 10 MG: 10 INJECTION, EMULSION INTRAMUSCULAR; INTRAVENOUS; SUBCUTANEOUS at 08:47

## 2021-05-22 RX ADMIN — HYDROMORPHONE HYDROCHLORIDE 0.5 MG: 1 INJECTION, SOLUTION INTRAMUSCULAR; INTRAVENOUS; SUBCUTANEOUS at 08:47

## 2021-05-22 RX ADMIN — ACETYLCYSTEINE 11180 MG: 200 INJECTION, SOLUTION INTRAVENOUS at 14:25

## 2021-05-22 RX ADMIN — Medication 10 ML: at 14:26

## 2021-05-22 RX ADMIN — Medication 10 ML: at 21:13

## 2021-05-22 RX ADMIN — GABAPENTIN 1200 MG: 400 CAPSULE ORAL at 16:55

## 2021-05-22 RX ADMIN — TOPIRAMATE 50 MG: 25 TABLET, FILM COATED ORAL at 21:11

## 2021-05-22 RX ADMIN — IOPAMIDOL 100 ML: 755 INJECTION, SOLUTION INTRAVENOUS at 12:01

## 2021-05-22 RX ADMIN — Medication 10 ML: at 06:52

## 2021-05-22 RX ADMIN — GABAPENTIN 1200 MG: 400 CAPSULE ORAL at 21:11

## 2021-05-22 RX ADMIN — ONDANSETRON 4 MG: 2 INJECTION INTRAMUSCULAR; INTRAVENOUS at 08:18

## 2021-05-22 RX ADMIN — ALBUMIN (HUMAN) 12.5 G: 0.25 INJECTION, SOLUTION INTRAVENOUS at 06:50

## 2021-05-22 NOTE — PROGRESS NOTES
Bedside and Verbal shift change report given to Marea Klinefelter, RN (oncoming nurse) by Karlee Oswald RN (offgoing nurse). Report included the following information SBAR, Kardex, Intake/Output, MAR and Recent Results.

## 2021-05-22 NOTE — PROGRESS NOTES
Problem: Falls - Risk of  Goal: *Absence of Falls  Description: Document Yuly Keita Fall Risk and appropriate interventions in the flowsheet.   Outcome: Progressing Towards Goal  Note: Fall Risk Interventions:            Medication Interventions: Patient to call before getting OOB, Teach patient to arise slowly, Evaluate medications/consider consulting pharmacy    Elimination Interventions: Call light in reach, Patient to call for help with toileting needs, Stay With Me (per policy), Toileting schedule/hourly rounds    History of Falls Interventions: Consult care management for discharge planning, Door open when patient unattended, Investigate reason for fall         Problem: Patient Education: Go to Patient Education Activity  Goal: Patient/Family Education  Outcome: Progressing Towards Goal     Problem: Nutrition Deficit  Goal: *Optimize nutritional status  Outcome: Progressing Towards Goal

## 2021-05-22 NOTE — PROGRESS NOTES
[facility] Adult  Hospitalist Group                                                                                          Hospitalist Progress Note  Flor Lyles MD  Answering service: [phone] from in house phone        Date of Service:  2021  NAME:  Eve Munoz  :  1980  MRN:  [id number]      Admission Summary:   Eve Munoz is a 41 y.o. female with pmhx hep C cirrhosis, depression, GERD, dyslipidemia and PCOS who presents as a transfer from [facility] for liver failure. She reports 4 days of RUQ/epigastric pain associated with nausea and vomiting. She was seen at [facility], and found to have markedly elevated LFTs. She was treated with NAC, and vitamin K, and transferred to [facility] for hepatology evaluation. She denies ETOH, dietary supplements, rec drug use, or acetaminophen use. She does have a hx chronic hepatitis, and  hepatitis C which patient states was \"gone\" without tx. She was followed by Dr. Haramn Osman with last visits documented in . Interval history / Subjective:   Patient seen and examined   C/o abdominal pain, diffuse but mostly in lower abdomen     Assessment & Plan:     #Hepatitis  #Acute Liver Failure may be related to Hep C   -unknown etiology, ETOH, acetaminophen, UDS negative, denies use of supplements  -hx hep C -/autoimmune work-up  -per hepatology, c/w NAC and Vit K   -Hep C Reactive,   -daily labs   -hepatology following   -patient having rebound tenderness on exam, CT abd/pel repeated, without perf, obstruction, abscess. Trace free fluid noted. If worsening pain with fever, leukocytosis, would be concerned about SBP and start abx, will hold for now.        #Anxiety  #Neuropathic pain  #chronic pain  -Tramadol q6h    Code status: Full  DVT prophylaxis: SCD    Care Plan discussed with: Patient/Family and Nurse  Anticipated Disposition: Home w/Family  Anticipated Discharge: 24 hours to 48 hours     Hospital Problems  Date Reviewed: 5/13/2016        Codes Class Noted POA    Liver failure ([facility] ICD-10-CM: K72.90  ICD-9-CM: 572.8  5/21/2021 Unknown        Severe protein-calorie malnutrition ([facility].) ICD-10-CM: J40  ICD-9-CM: 262  5/21/2021 Yes                Review of Systems:   A comprehensive review of systems was negative. Vital Signs:    Last 24hrs VS reviewed since prior progress note. Most recent are:  Visit Vitals  BP (!) 88/53 (BP 1 Location: Left upper arm, BP Patient Position: At rest)   Pulse (!) 56   Temp 97.6 °F (36.4 °C)   Resp 13   Ht 5' 7\" (1.702 m)   Wt 81.6 kg (179 lb 14.3 oz)   SpO2 100%   BMI 28.18 kg/m²         Intake/Output Summary (Last 24 hours) at 5/22/2021 1318  Last data filed at 5/21/2021 2033  Gross per 24 hour   Intake    Output 600 ml   Net -600 ml        Physical Examination:     I had a face to face encounter with this patient and independently examined them on 5/22/2021 as outlined below:          Constitutional:  No acute distress, complaining of right upper quadrant pain tattoos all over   HEENT:  Oral mucosa moist, sclera icteric    Resp:  CTA bilaterally. CV:  Regular rhythm, normal rate    GI:  Soft, tenderness in lower abdomen with rebound    Musculoskeletal:  No edema, warm, 2+ pulses throughout    Neurologic:  Moves all extremities. AAOx3, CN II-XII reviewed     SKIN Jaundice        Data Review:    I personally reviewed  Image and labs    CT ABD PELV W CONT    Result Date: 5/22/2021  1. Diffuse fatty liver 2. Trace bilateral pleural effusions and basilar atelectasis    US ABD COMP    Result Date: 5/21/2021  1. Upper normal liver size with micronodular capsular configuration, suggesting chronic liver disease. No focal abnormality to suggest neoplasm. 2. Splenomegaly. 3. Increased echogenicity of both kidneys, suggesting medical renal parenchymal disease. Correlate with renal function parameters. 4. Status post cholecystectomy with no biliary ductal dilatation.     Labs:     Recent Labs 05/22/21  0219 05/21/21  1004   WBC 6.2 3.7   HGB 12.6 11.5   HCT 39.1 35.1    157     Recent Labs     05/22/21 0219 05/21/21  0248    137   K 3.5 3.3*    104   CO2 27 25   BUN 8 8   CREA 0.86 0.82   GLU 64* 59*   CA 8.0* 8.0*     Recent Labs     05/22/21 0219 05/21/21  0248   ALT 2,718* 3,483*   * 146*   TBILI 11.6* 11.4*   TP 6.4 5.6*   ALB 2.5* 2.1*   GLOB 3.9 3.5     Recent Labs     05/22/21 0219 05/21/21  0248   INR 1.9* 1.8*   PTP 19.2* 18.7*      No results for input(s): FE, TIBC, PSAT, FERR in the last 72 hours. No results found for: FOL, RBCF   No results for input(s): PH, PCO2, PO2 in the last 72 hours. No results for input(s): CPK, CKNDX, TROIQ in the last 72 hours.     No lab exists for component: CPKMB  No results found for: CHOL, CHOLX, CHLST, CHOLV, HDL, HDLP, LDL, LDLC, DLDLP, TGLX, TRIGL, TRIGP, CHHD, CHHDX  Lab Results   Component Value Date/Time    Glucose (POC) 102 (H) 05/26/2015 05:49 PM    Glucose (POC) 88 05/26/2015 06:01 AM    Glucose (POC) 122 (H) 05/26/2015 12:03 AM    Glucose (POC) 123 (H) 05/25/2015 05:11 PM    Glucose (POC) 85 05/25/2015 11:12 AM     No results found for: COLOR, APPRN, SPGRU, REFSG, GARCIA, PROTU, GLUCU, KETU, BILU, UROU, FABIANA, LEUKU, GLUKE, EPSU, BACTU, WBCU, RBCU, CASTS, UCRY      Medications Reviewed:     Current Facility-Administered Medications   Medication Dose Route Frequency    sodium chloride (NS) flush 5-40 mL  5-40 mL IntraVENous Q8H    sodium chloride (NS) flush 5-40 mL  5-40 mL IntraVENous PRN    ondansetron (ZOFRAN ODT) tablet 4 mg  4 mg Oral Q8H PRN    Or    ondansetron (ZOFRAN) injection 4 mg  4 mg IntraVENous Q6H PRN    phytonadione (vitamin K1) (AQUA-MEPHYTON) 10 mg in 0.9% sodium chloride 50 mL IVPB  10 mg IntraVENous DAILY    acetylcysteine (ACETADOTE) 11,180 mg in dextrose 5% 1,000 mL infusion  150 mg/kg IntraVENous Q24H    gabapentin (NEURONTIN) capsule 1,200 mg  1,200 mg Oral TID    topiramate (TOPAMAX) tablet 50 mg  50 mg Oral QHS    traMADoL (ULTRAM) tablet 50 mg  50 mg Oral Q6H PRN     ______________________________________________________________________  EXPECTED LENGTH OF STAY: - - -  ACTUAL LENGTH OF STAY:          2    Total time spent: 35 minutes                Dorcas Craft MD

## 2021-05-22 NOTE — PROGRESS NOTES
Bedside and Verbal shift change report given to Tato Mora (oncoming nurse) by [facility], RN (offgoing nurse).  Report included the following information SBAR, Kardex, MAR, Recent Results and Cardiac Rhythm SR.

## 2021-05-22 NOTE — PROGRESS NOTES
Problem: Falls - Risk of  Goal: *Absence of Falls  Description: Document Yuly Randy Fall Risk and appropriate interventions in the flowsheet.   Outcome: Progressing Towards Goal  Note: Fall Risk Interventions:            Medication Interventions: Patient to call before getting OOB, Teach patient to arise slowly    Elimination Interventions: Call light in reach, Patient to call for help with toileting needs, Toileting schedule/hourly rounds    History of Falls Interventions: Consult care management for discharge planning, Door open when patient unattended

## 2021-05-23 LAB
ALBUMIN SERPL-MCNC: 2.2 G/DL (ref 3.5–5)
ALBUMIN/GLOB SERPL: 0.6 {RATIO} (ref 1.1–2.2)
ALP SERPL-CCNC: 145 U/L (ref 45–117)
ALT SERPL-CCNC: 1756 U/L (ref 12–78)
ANION GAP SERPL CALC-SCNC: 7 MMOL/L (ref 5–15)
AST SERPL-CCNC: 893 U/L (ref 15–37)
BASOPHILS # BLD: 0.1 K/UL (ref 0–0.1)
BASOPHILS NFR BLD: 2 % (ref 0–1)
BILIRUB SERPL-MCNC: 11.1 MG/DL (ref 0.2–1)
BUN SERPL-MCNC: 7 MG/DL (ref 6–20)
BUN/CREAT SERPL: 7 (ref 12–20)
CALCIUM SERPL-MCNC: 7.9 MG/DL (ref 8.5–10.1)
CHLORIDE SERPL-SCNC: 106 MMOL/L (ref 97–108)
CO2 SERPL-SCNC: 24 MMOL/L (ref 21–32)
CREAT SERPL-MCNC: 0.97 MG/DL (ref 0.55–1.02)
DIFFERENTIAL METHOD BLD: ABNORMAL
EOSINOPHIL # BLD: 0.1 K/UL (ref 0–0.4)
EOSINOPHIL NFR BLD: 3 % (ref 0–7)
ERYTHROCYTE [DISTWIDTH] IN BLOOD BY AUTOMATED COUNT: 14.3 % (ref 11.5–14.5)
GLOBULIN SER CALC-MCNC: 4 G/DL (ref 2–4)
GLUCOSE SERPL-MCNC: 142 MG/DL (ref 65–100)
HCT VFR BLD AUTO: 37.4 % (ref 35–47)
HGB BLD-MCNC: 12.2 G/DL (ref 11.5–16)
IMM GRANULOCYTES # BLD AUTO: 0 K/UL
IMM GRANULOCYTES NFR BLD AUTO: 0 %
INR PPP: 1.6 (ref 0.9–1.1)
LYMPHOCYTES # BLD: 1.8 K/UL (ref 0.8–3.5)
LYMPHOCYTES NFR BLD: 39 % (ref 12–49)
MCH RBC QN AUTO: 30.4 PG (ref 26–34)
MCHC RBC AUTO-ENTMCNC: 32.6 G/DL (ref 30–36.5)
MCV RBC AUTO: 93.3 FL (ref 80–99)
MONOCYTES # BLD: 0.4 K/UL (ref 0–1)
MONOCYTES NFR BLD: 8 % (ref 5–13)
NEUTS BAND NFR BLD MANUAL: 6 % (ref 0–6)
NEUTS SEG # BLD: 2.3 K/UL (ref 1.8–8)
NEUTS SEG NFR BLD: 42 % (ref 32–75)
NRBC # BLD: 0 K/UL (ref 0–0.01)
NRBC BLD-RTO: 0 PER 100 WBC
PLATELET # BLD AUTO: 154 K/UL (ref 150–400)
PMV BLD AUTO: 11.2 FL (ref 8.9–12.9)
POTASSIUM SERPL-SCNC: 3.2 MMOL/L (ref 3.5–5.1)
PROT SERPL-MCNC: 6.2 G/DL (ref 6.4–8.2)
PROTHROMBIN TIME: 15.9 SEC (ref 9–11.1)
RBC # BLD AUTO: 4.01 M/UL (ref 3.8–5.2)
RBC MORPH BLD: ABNORMAL
RBC MORPH BLD: ABNORMAL
SODIUM SERPL-SCNC: 137 MMOL/L (ref 136–145)
WBC # BLD AUTO: 4.7 K/UL (ref 3.6–11)

## 2021-05-23 PROCEDURE — 74011250637 HC RX REV CODE- 250/637: Performed by: FAMILY MEDICINE

## 2021-05-23 PROCEDURE — 74011250637 HC RX REV CODE- 250/637: Performed by: HOSPITALIST

## 2021-05-23 PROCEDURE — 74011000258 HC RX REV CODE- 258: Performed by: HOSPITALIST

## 2021-05-23 PROCEDURE — 74011250637 HC RX REV CODE- 250/637: Performed by: NURSE PRACTITIONER

## 2021-05-23 PROCEDURE — 74011250636 HC RX REV CODE- 250/636: Performed by: FAMILY MEDICINE

## 2021-05-23 PROCEDURE — 65270000029 HC RM PRIVATE

## 2021-05-23 PROCEDURE — 80053 COMPREHEN METABOLIC PANEL: CPT

## 2021-05-23 PROCEDURE — 36415 COLL VENOUS BLD VENIPUNCTURE: CPT

## 2021-05-23 PROCEDURE — 74011250636 HC RX REV CODE- 250/636: Performed by: NURSE PRACTITIONER

## 2021-05-23 PROCEDURE — 99232 SBSQ HOSP IP/OBS MODERATE 35: CPT | Performed by: INTERNAL MEDICINE

## 2021-05-23 PROCEDURE — 74011250636 HC RX REV CODE- 250/636: Performed by: HOSPITALIST

## 2021-05-23 PROCEDURE — 85025 COMPLETE CBC W/AUTO DIFF WBC: CPT

## 2021-05-23 PROCEDURE — 85610 PROTHROMBIN TIME: CPT

## 2021-05-23 RX ORDER — POTASSIUM CHLORIDE 750 MG/1
40 TABLET, FILM COATED, EXTENDED RELEASE ORAL 2 TIMES DAILY
Status: DISCONTINUED | OUTPATIENT
Start: 2021-05-23 | End: 2021-05-25 | Stop reason: HOSPADM

## 2021-05-23 RX ORDER — MAG HYDROX/ALUMINUM HYD/SIMETH 200-200-20
30 SUSPENSION, ORAL (FINAL DOSE FORM) ORAL
Status: DISCONTINUED | OUTPATIENT
Start: 2021-05-23 | End: 2021-05-25 | Stop reason: HOSPADM

## 2021-05-23 RX ADMIN — ONDANSETRON 4 MG: 2 INJECTION INTRAMUSCULAR; INTRAVENOUS at 03:53

## 2021-05-23 RX ADMIN — GABAPENTIN 1200 MG: 400 CAPSULE ORAL at 09:22

## 2021-05-23 RX ADMIN — Medication 10 ML: at 21:13

## 2021-05-23 RX ADMIN — HYDROMORPHONE HYDROCHLORIDE 0.5 MG: 1 INJECTION, SOLUTION INTRAMUSCULAR; INTRAVENOUS; SUBCUTANEOUS at 21:25

## 2021-05-23 RX ADMIN — HYDROMORPHONE HYDROCHLORIDE 0.5 MG: 1 INJECTION, SOLUTION INTRAMUSCULAR; INTRAVENOUS; SUBCUTANEOUS at 15:31

## 2021-05-23 RX ADMIN — HYDROMORPHONE HYDROCHLORIDE 0.5 MG: 1 INJECTION, SOLUTION INTRAMUSCULAR; INTRAVENOUS; SUBCUTANEOUS at 09:22

## 2021-05-23 RX ADMIN — ACETYLCYSTEINE 11180 MG: 200 INJECTION, SOLUTION INTRAVENOUS at 15:27

## 2021-05-23 RX ADMIN — PHYTONADIONE 10 MG: 10 INJECTION, EMULSION INTRAMUSCULAR; INTRAVENOUS; SUBCUTANEOUS at 09:22

## 2021-05-23 RX ADMIN — TRAMADOL HYDROCHLORIDE 50 MG: 50 TABLET, COATED ORAL at 06:59

## 2021-05-23 RX ADMIN — POTASSIUM CHLORIDE 40 MEQ: 750 TABLET, FILM COATED, EXTENDED RELEASE ORAL at 18:23

## 2021-05-23 RX ADMIN — HYDROMORPHONE HYDROCHLORIDE 0.5 MG: 1 INJECTION, SOLUTION INTRAMUSCULAR; INTRAVENOUS; SUBCUTANEOUS at 03:58

## 2021-05-23 RX ADMIN — ONDANSETRON 4 MG: 4 TABLET, ORALLY DISINTEGRATING ORAL at 17:18

## 2021-05-23 RX ADMIN — Medication 10 ML: at 13:24

## 2021-05-23 RX ADMIN — Medication 10 ML: at 09:23

## 2021-05-23 RX ADMIN — TOPIRAMATE 50 MG: 25 TABLET, FILM COATED ORAL at 21:13

## 2021-05-23 RX ADMIN — ALUMINUM HYDROXIDE, MAGNESIUM HYDROXIDE, AND SIMETHICONE 30 ML: 200; 200; 20 SUSPENSION ORAL at 22:52

## 2021-05-23 RX ADMIN — GABAPENTIN 1200 MG: 400 CAPSULE ORAL at 15:31

## 2021-05-23 RX ADMIN — GABAPENTIN 1200 MG: 400 CAPSULE ORAL at 21:13

## 2021-05-23 RX ADMIN — POTASSIUM CHLORIDE 40 MEQ: 750 TABLET, FILM COATED, EXTENDED RELEASE ORAL at 09:22

## 2021-05-23 NOTE — PROGRESS NOTES
2042 paged hospitalist for pain medication c/o 8/10 sharp pain to abdomen    2051 Rodrigo Castrejon NP called back and gave 1 time order for dilaudid and stated to continue toradol q6h

## 2021-05-23 NOTE — PROGRESS NOTES
Physician Progress Note      PATIENT:               Que Lai  CSN #:                  [id number]  :                       1980  ADMIT DATE:       2021 10:40 PM  [address] [place] DATE:  RESPONDING  PROVIDER #:        Lynda Hawley MD          QUERY TEXT:    Patient admitted with acute hepatitis/liver failure. Noted documentation in registered dietician's  note that the patient has severe malnutrition, which was added to patient's active problem list. Patient reports weight loss and poor appetitie. If possible, please document in progress notes and discharge summary if you are evaluating and /or treating any of the following: The medical record reflects the following:  Risk Factors: 39yo with Inadequate oral intake related to altered GI function  Clinical Indicators: Registered Dietician (): - Reports UBW was around 185 lb, so believes has lost at least 20 lb in past 2 months (11.3% BW).   - She does exhibit significant temporal wasting, ?buccal fat loss - but difficult to tell with no teeth  - Inadequate oral intake related to altered GI function, early satiety as evidenced by weight loss, poor intake prior to admission, moderate muscle loss  - Findings of the 6 clinical characteristics of malnutrition:  - Energy Intake:  7 - 50% or less of est energy requirements for 5 or more days  - Weight Loss:  7.00 - Greater than 7.5% over 3 months  - Muscle Mass Loss:  7 - Moderate muscle mass loss, Temples (temporalis)  Treatment: Registered Dietician following, nutritional supplement (Ensure Clear), regular diet    Thank you,  Samantha Boast  [phone]  Options provided:  -- Protein calorie malnutrition mild  -- Protein calorie malnutrition moderate  -- Protein calorie malnutrition severe  -- Other - I will add my own diagnosis  -- Disagree - Not applicable / Not valid  -- Disagree - Clinically unable to determine / Unknown  -- Refer to Clinical Documentation Reviewer    PROVIDER RESPONSE TEXT:    No protein malnutrition, patient's albumin low due to liver dz    Query created by: Maryjane Linares on 5/23/2021 1:20 PM      Electronically signed by:  Kyle Carey MD 5/23/2021 1:25 PM

## 2021-05-23 NOTE — PROGRESS NOTES
Sherif Ahn MD, Tiff Ball MD, MPH      Gardenia So, YOBANY Mensah, [facility]     Cristina King, [facility]   Yaz Aguillon THANIA-C    Chetan Batista, [facility]       Zay Walter Feroz [address] 136    at [address], [facility] Ina Thompson  22.    [phone]    FAX: [address] [place] [address], [address]    [phone]    FAX: [phone]       HEPATOLOGY PROGRESS NOTE  Huong Schwartz is a 41year old female who developed acute icteric hepatitis in 5/2015. Anti-HCV and HCV RNA was positive at that time with genotype 1B. The liver enzymes were coming down and repeat HCV RNA in 10/2015 was undetectable. She did well for the past 6 years. She presented to OSH for RUQ pain associated with nausea, vomiting and jaundice. She was noted to have markedly elevated liver transaminases into the 3376-5650 range with TBILI of 12 mg. She denies IVD abuse or alcohol use. She denies using acetaminophen based medications. She denies use of herbal medication. CT of the abdomen /pelvis performed at [facility] showed hepatitis, steatosis, splenomegaly, cholecystectomy, and peripancreatic and portacaval lymph nodes likley related to hepatitis. She was started on IV NAC and I.V vitamin K. She was transferred to [facility];  Laboratory studies at [facility] showed ALT 3,483, AST > 2000, . TBILI 11 mg, INR 1.8    She feels much better today. Serology for HCV RNA is positive  The AST is down from 3400 to 893, ALT is down from >2000 to 1700, TBILI is stable in the 11s, INR is down from 1.9 to 1.6    Plan for today:  Continue IV NAC  Continue IV vitamin K for 3 total doses.   Monitor hepatic panel and INR QD  Suspect this is another episode of acute HCV. HCV never causes liver failure and so the live enzymes and INR should come down nicely.       ASSESSMENT AND PLAN:  Acute Hepatitis C   She had an episode of what appeared to be acute HCV in 2015. The risk factor for acute HCV back then was using a shared razor. HCV RNA then became undetectable in 10/2015 and it appeared that she achieved spontaneous resolution. She was found to have elevated liver enzymes in 6/2020 and HCV RNA at that time was negative x 2. This is all consistent with spontaneous resolution of HCV. She now has another episode of what appears to be acute HCV. HCV RNA does not lay dormant and then flair like HBV does. Will test for HCV genotype. IF this is not 1B this is a new HCV infection. Having more than one episode of acute HCV is certainly possible since there are no protective antibodies to HCV. What is unusual is that she denies any risk factors for HCV. Will continue to monitor and if this does resolve will treat with DAA therapy in 3-6 months. .     Acute hepatitis  This appears to be to due to HCV. HCV RNA was documentned to be negative in 2020  HCV RNA is now positive. Serology for other causes of acute and chronic liver disease are negative so far. Await autoimmune serology. Will continue I.V NAC  Will continue IV Vitamin for for a total of 3 doses. NO opioids, or sedating medications    Check Urine toxicology screen    Coagulopathy  This is due to acute hepatitis. Getting 3 doses of IV Vitamin K over 3 days. INR is coming down. PHYSICAL EXAMINATION:  VS: per nursing note  General: NO acute distress. Eyes: Sclera icteric. ENT: No oral lesions. Thyroid normal.  Nodes: No adenopathy. Skin: No spider angiomata. Jaundiced. Respiratory: Lungs clear to auscultation. Cardiovascular: Regular heart rate. No murmurs. No JVD. Abdomen: Soft, RUQ tenderness.   Liver size normal to percussion/palpation. Spleen not palpable. No obvious ascites. Extremities: No edema. No muscle wasting. No gross arthritic changes. Neurologic: Alert and oriented. Cranial nerves grossly intact. No asterixis.     LABORATORY:  Results for Bettina Davis (MRN [id number]) as of 5/23/2021 14:04   Ref. Range 5/21/2021 02:48 5/22/2021 02:19 5/23/2021 04:04   WBC Latest Ref Range: 3.6 - 11.0 K/uL 4.0 6.2 4.7   HGB Latest Ref Range: 11.5 - 16.0 g/dL 12.2 12.6 12.2   PLATELET Latest Ref Range: 150 - 400 K/uL 160 173 154   INR Latest Ref Range: 0.9 - 1.1   1.8 (H) 1.9 (H) 1.6 (H)   Sodium Latest Ref Range: 136 - 145 mmol/L 137 141 137   Potassium Latest Ref Range: 3.5 - 5.1 mmol/L 3.3 (L) 3.5 3.2 (L)   Chloride Latest Ref Range: 97 - 108 mmol/L 104 106 106   CO2 Latest Ref Range: 21 - 32 mmol/L 25 27 24   Glucose Latest Ref Range: 65 - 100 mg/dL 59 (L) 64 (L) 142 (H)   BUN Latest Ref Range: 6 - 20 MG/DL 8 8 7   Creatinine Latest Ref Range: 0.55 - 1.02 MG/DL 0.82 0.86 0.97   Bilirubin, total Latest Ref Range: 0.2 - 1.0 MG/DL 11.4 (H) 11.6 (H) 11.1 (H)   Albumin Latest Ref Range: 3.5 - 5.0 g/dL 2.1 (L) 2.5 (L) 2.2 (L)   ALT Latest Ref Range: 12 - 78 U/L 3,483 (H) 2,718 (H) 1,756 (H)   AST Latest Ref Range: 15 - 37 U/L >2,000 (H) 2,000 (H) 893 (H)   Alk. phosphatase Latest Ref Range: 45 - 117 U/L 146 (H) 140 (H) 145 (H)       RADIOLOGY:  Ultrasound of liver. Enlarged with somewhat nodular appearing liver c/w chronic liver disease. No liver mass lesions. Normal PV flow.       Samule Dar, MD  [facility] 13 [address], [address] [place] [address] 22.  [phone]  [address] [place] [address]

## 2021-05-23 NOTE — PROGRESS NOTES
Problem: Falls - Risk of  Goal: *Absence of Falls  Description: Document Yamilet Proctor Fall Risk and appropriate interventions in the flowsheet. Outcome: Progressing Towards Goal  Note: Fall Risk Interventions:            Medication Interventions: Patient to call before getting OOB, Teach patient to arise slowly    Elimination Interventions: Call light in reach, Toileting schedule/hourly rounds    History of Falls Interventions: Consult care management for discharge planning, Investigate reason for fall         Problem: Patient Education: Go to Patient Education Activity  Goal: Patient/Family Education  Outcome: Progressing Towards Goal     Problem: Nutrition Deficit  Goal: *Optimize nutritional status  Outcome: Progressing Towards Goal     Problem: Pressure Injury - Risk of  Goal: *Prevention of pressure injury  Description: Document Allen Scale and appropriate interventions in the flowsheet.   Outcome: Progressing Towards Goal  Note: Pressure Injury Interventions:  Sensory Interventions: Keep linens dry and wrinkle-free, Minimize linen layers         Activity Interventions: Increase time out of bed, Pressure redistribution bed/mattress(bed type)    Mobility Interventions: HOB 30 degrees or less, Pressure redistribution bed/mattress (bed type)    Nutrition Interventions: Document food/fluid/supplement intake, Offer support with meals,snacks and hydration    Friction and Shear Interventions: HOB 30 degrees or less, Lift sheet, Minimize layers                Problem: Patient Education: Go to Patient Education Activity  Goal: Patient/Family Education  Outcome: Progressing Towards Goal

## 2021-05-23 NOTE — PROGRESS NOTES
Bedside and Verbal shift change report given to Christina Horvath RN (oncoming nurse) by Wendy Lizama RN  (offgoing nurse). Report included the following information SBAR, Kardex, Procedure Summary, Intake/Output, MAR and Recent Results.

## 2021-05-23 NOTE — PROGRESS NOTES
[facility] Adult  Hospitalist Group                                                                                          Hospitalist Progress Note  Francisco White MD  Answering service: [phone] from in house phone        Date of Service:  2021  NAME:  Hoa Orellana  :  1980  MRN:  [id number]      Admission Summary:   Hoa Orellana is a 41 y.o. female with pmhx hep C cirrhosis, depression, GERD, dyslipidemia and PCOS who presents as a transfer from [facility] for liver failure. She reports 4 days of RUQ/epigastric pain associated with nausea and vomiting. She was seen at [facility], and found to have markedly elevated LFTs. She was treated with NAC, and vitamin K, and transferred to [facility] for hepatology evaluation. She denies ETOH, dietary supplements, rec drug use, or acetaminophen use. She does have a hx chronic hepatitis, and  hepatitis C which patient states was \"gone\" without tx. She was followed by Dr. Bailey Arzate with last visits documented in . Interval history / Subjective:   Patient seen and examined   C/o abdominal pain, diffuse but mostly in lower abdomen     Assessment & Plan:     #Hepatitis  #Acute Liver Failure may be related to Hep C   -unknown etiology, ETOH, acetaminophen, UDS negative, denies use of supplements  -hx hep C -/autoimmune work-up  -per hepatology, c/w NAC and Vit K   -Hep C Reactive,   -daily labs   -hepatology following   -patient having rebound tenderness on exam, CT abd/pel repeated, without perf, obstruction, abscess. Trace free fluid noted. If worsening pain with fever, leukocytosis, would be concerned about SBP and start abx, will hold for now.      #Anxiety  #Neuropathic pain  #chronic pain  -Tramadol q6h    Code status: Full  DVT prophylaxis: SCD    Care Plan discussed with: Patient/Family and Nurse  Anticipated Disposition: Home w/Family  Anticipated Discharge: 24 hours to 48 hours d/c home in [facility] Problems  Date Reviewed: 5/13/2016        Codes Class Noted POA    Liver failure ([facility] 75.) ICD-10-CM: K72.90  ICD-9-CM: 572.8  5/21/2021 Unknown        Severe protein-calorie malnutrition ([facility] ICD-10-CM: W61  ICD-9-CM: 262  5/21/2021 Yes                Review of Systems:   A comprehensive review of systems was negative. Vital Signs:    Last 24hrs VS reviewed since prior progress note. Most recent are:  Visit Vitals  BP 97/67 (BP 1 Location: Left upper arm, BP Patient Position: At rest)   Pulse 68   Temp 98.5 °F (36.9 °C)   Resp 14   Ht 5' 7\" (1.702 m)   Wt 76 kg (167 lb 8.8 oz)   SpO2 95%   BMI 26.24 kg/m²         Intake/Output Summary (Last 24 hours) at 5/23/2021 1211  Last data filed at 5/23/2021 4071  Gross per 24 hour   Intake 1858.27 ml   Output 2650 ml   Net -791.73 ml        Physical Examination:     I had a face to face encounter with this patient and independently examined them on 5/23/2021 as outlined below:          Constitutional:  No acute distress, complaining of right upper quadrant pain tattoos all over   HEENT:  Oral mucosa moist, sclera icteric    Resp:  CTA bilaterally. CV:  Regular rhythm, normal rate    GI:  Soft, tenderness in lower abdomen with rebound    Musculoskeletal:  No edema, warm, 2+ pulses throughout    Neurologic:  Moves all extremities. AAOx3, CN II-XII reviewed     SKIN Jaundice        Data Review:    I personally reviewed  Image and labs    CT ABD PELV W CONT    Result Date: 5/22/2021  1. Diffuse fatty liver 2. Trace bilateral pleural effusions and basilar atelectasis    US ABD COMP    Result Date: 5/21/2021  1. Upper normal liver size with micronodular capsular configuration, suggesting chronic liver disease. No focal abnormality to suggest neoplasm. 2. Splenomegaly. 3. Increased echogenicity of both kidneys, suggesting medical renal parenchymal disease. Correlate with renal function parameters. 4. Status post cholecystectomy with no biliary ductal dilatation.     Labs: Recent Labs     05/23/21 0404 05/22/21 0219   WBC 4.7 6.2   HGB 12.2 12.6   HCT 37.4 39.1    173     Recent Labs     05/23/21 0404 05/22/21 0219 05/21/21  0248    141 137   K 3.2* 3.5 3.3*    106 104   CO2 24 27 25   BUN 7 8 8   CREA 0.97 0.86 0.82   * 64* 59*   CA 7.9* 8.0* 8.0*     Recent Labs     05/23/21 0404 05/22/21 0219 05/21/21  0248   ALT 1,756* 2,718* 3,483*   * 140* 146*   TBILI 11.1* 11.6* 11.4*   TP 6.2* 6.4 5.6*   ALB 2.2* 2.5* 2.1*   GLOB 4.0 3.9 3.5     Recent Labs     05/23/21 0404 05/22/21 0219 05/21/21  0248   INR 1.6* 1.9* 1.8*   PTP 15.9* 19.2* 18.7*      No results for input(s): FE, TIBC, PSAT, FERR in the last 72 hours. No results found for: FOL, RBCF   No results for input(s): PH, PCO2, PO2 in the last 72 hours. No results for input(s): CPK, CKNDX, TROIQ in the last 72 hours.     No lab exists for component: CPKMB  No results found for: CHOL, CHOLX, CHLST, CHOLV, HDL, HDLP, LDL, LDLC, DLDLP, TGLX, TRIGL, TRIGP, CHHD, CHHDX  Lab Results   Component Value Date/Time    Glucose (POC) 102 (H) 05/26/2015 05:49 PM    Glucose (POC) 88 05/26/2015 06:01 AM    Glucose (POC) 122 (H) 05/26/2015 12:03 AM    Glucose (POC) 123 (H) 05/25/2015 05:11 PM    Glucose (POC) 85 05/25/2015 11:12 AM     No results found for: COLOR, APPRN, SPGRU, REFSG, GARCIA, PROTU, GLUCU, KETU, BILU, UROU, FABIANA, LEUKU, GLUKE, EPSU, BACTU, WBCU, RBCU, CASTS, UCRY      Medications Reviewed:     Current Facility-Administered Medications   Medication Dose Route Frequency    potassium chloride SR (KLOR-CON 10) tablet 40 mEq  40 mEq Oral BID    HYDROmorphone (PF) (DILAUDID) injection 0.5 mg  0.5 mg IntraVENous Q6H PRN    sodium chloride (NS) flush 5-40 mL  5-40 mL IntraVENous Q8H    sodium chloride (NS) flush 5-40 mL  5-40 mL IntraVENous PRN    ondansetron (ZOFRAN ODT) tablet 4 mg  4 mg Oral Q8H PRN    Or    ondansetron (ZOFRAN) injection 4 mg  4 mg IntraVENous Q6H PRN    acetylcysteine (ACETADOTE) 11,180 mg in dextrose 5% 1,000 mL infusion  150 mg/kg IntraVENous Q24H    gabapentin (NEURONTIN) capsule 1,200 mg  1,200 mg Oral TID    topiramate (TOPAMAX) tablet 50 mg  50 mg Oral QHS    traMADoL (ULTRAM) tablet 50 mg  50 mg Oral Q6H PRN     ______________________________________________________________________  EXPECTED LENGTH OF STAY: - - -  ACTUAL LENGTH OF STAY:          3    Total time spent: 35 minutes                Scarlett Choi MD

## 2021-05-23 NOTE — PROGRESS NOTES
Physician Progress Note      PATIENT:               Huan Shepherd  CSN #:                  [id number]  :                       1980  ADMIT DATE:       2021 10:40 PM  [address] [place] DATE:  RESPONDING  PROVIDER #:        Abdoulaye Eubanks MD          QUERY TEXT:    Patient admitted with acute hepatitis/liver failure. Noted documentation of chronic anemia in Hospitalist's note on . HGB has been 11.5-12.6 during admission and noted that 1U was ordered but no order can be found. In order to support the diagnosis of chronic anemia, please include additional clinical indicators in your documentation. Or please document if the diagnosis of chronic anemia has been ruled out after further study. The medical record reflects the following:  Risk Factors: 39yo with hx of Hep C and being treated for liver failure  Clinical Indicators:  -  Hospitalist: Chronic Anemia  - 1U ordered  - monitor  - no active bleeding  - HGB 11.5-12.6  Treatment: daily labs, noted that 1U ordered but no order could be found by CDI    Thank you,  David Tapia  [phone]  Options provided:  -- Chronic anemia present as evidenced by, Please document evidence.   -- Chronic anemia was ruled out  -- Other - I will add my own diagnosis  -- Disagree - Not applicable / Not valid  -- Disagree - Clinically unable to determine / Unknown  -- Refer to Clinical Documentation Reviewer    PROVIDER RESPONSE TEXT:    Do not have anemia , addendum done in my note      Query created by: Zane Saenz on 2021 1:28 PM      Electronically signed by:  Abdoulaye Eubanks MD 2021 1:47 PM

## 2021-05-24 LAB
ALBUMIN SERPL-MCNC: 2.1 G/DL (ref 3.5–5)
ALBUMIN/GLOB SERPL: 0.5 {RATIO} (ref 1.1–2.2)
ALP SERPL-CCNC: 142 U/L (ref 45–117)
ALT SERPL-CCNC: 1215 U/L (ref 12–78)
ANION GAP SERPL CALC-SCNC: 7 MMOL/L (ref 5–15)
AST SERPL-CCNC: 442 U/L (ref 15–37)
BASOPHILS # BLD: 0 K/UL (ref 0–0.1)
BASOPHILS NFR BLD: 0 % (ref 0–1)
BILIRUB SERPL-MCNC: 10.3 MG/DL (ref 0.2–1)
BUN SERPL-MCNC: 7 MG/DL (ref 6–20)
BUN/CREAT SERPL: 8 (ref 12–20)
CALCIUM SERPL-MCNC: 7.9 MG/DL (ref 8.5–10.1)
CHLORIDE SERPL-SCNC: 107 MMOL/L (ref 97–108)
CO2 SERPL-SCNC: 23 MMOL/L (ref 21–32)
CREAT SERPL-MCNC: 0.9 MG/DL (ref 0.55–1.02)
DIFFERENTIAL METHOD BLD: NORMAL
EOSINOPHIL # BLD: 0.2 K/UL (ref 0–0.4)
EOSINOPHIL NFR BLD: 4 % (ref 0–7)
ERYTHROCYTE [DISTWIDTH] IN BLOOD BY AUTOMATED COUNT: 14.5 % (ref 11.5–14.5)
GLOBULIN SER CALC-MCNC: 4.1 G/DL (ref 2–4)
GLUCOSE SERPL-MCNC: 132 MG/DL (ref 65–100)
HAV AB SER QL IA: POSITIVE
HAV IGM SERPL QL IA: POSITIVE
HCT VFR BLD AUTO: 35.7 % (ref 35–47)
HGB BLD-MCNC: 11.5 G/DL (ref 11.5–16)
IMM GRANULOCYTES # BLD AUTO: 0 K/UL
IMM GRANULOCYTES NFR BLD AUTO: 0 %
INR PPP: 1.4 (ref 0.9–1.1)
LYMPHOCYTES # BLD: 1.6 K/UL (ref 0.8–3.5)
LYMPHOCYTES NFR BLD: 35 % (ref 12–49)
MCH RBC QN AUTO: 30.3 PG (ref 26–34)
MCHC RBC AUTO-ENTMCNC: 32.2 G/DL (ref 30–36.5)
MCV RBC AUTO: 93.9 FL (ref 80–99)
MONOCYTES # BLD: 0.4 K/UL (ref 0–1)
MONOCYTES NFR BLD: 8 % (ref 5–13)
NEUTS BAND NFR BLD MANUAL: 3 % (ref 0–6)
NEUTS SEG # BLD: 2.3 K/UL (ref 1.8–8)
NEUTS SEG NFR BLD: 50 % (ref 32–75)
NRBC # BLD: 0 K/UL (ref 0–0.01)
NRBC BLD-RTO: 0 PER 100 WBC
PLATELET # BLD AUTO: 156 K/UL (ref 150–400)
PMV BLD AUTO: 11.5 FL (ref 8.9–12.9)
POTASSIUM SERPL-SCNC: 4.1 MMOL/L (ref 3.5–5.1)
PROT SERPL-MCNC: 6.2 G/DL (ref 6.4–8.2)
PROTHROMBIN TIME: 14.6 SEC (ref 9–11.1)
RBC # BLD AUTO: 3.8 M/UL (ref 3.8–5.2)
RBC MORPH BLD: NORMAL
SODIUM SERPL-SCNC: 137 MMOL/L (ref 136–145)
WBC # BLD AUTO: 4.5 K/UL (ref 3.6–11)
WBC MORPH BLD: NORMAL

## 2021-05-24 PROCEDURE — 74011250637 HC RX REV CODE- 250/637: Performed by: HOSPITALIST

## 2021-05-24 PROCEDURE — 74011250637 HC RX REV CODE- 250/637: Performed by: NURSE PRACTITIONER

## 2021-05-24 PROCEDURE — 74011250636 HC RX REV CODE- 250/636: Performed by: FAMILY MEDICINE

## 2021-05-24 PROCEDURE — 65270000032 HC RM SEMIPRIVATE

## 2021-05-24 PROCEDURE — 74011250636 HC RX REV CODE- 250/636: Performed by: NURSE PRACTITIONER

## 2021-05-24 PROCEDURE — 99233 SBSQ HOSP IP/OBS HIGH 50: CPT | Performed by: HOSPITALIST

## 2021-05-24 PROCEDURE — 85610 PROTHROMBIN TIME: CPT

## 2021-05-24 PROCEDURE — 85025 COMPLETE CBC W/AUTO DIFF WBC: CPT

## 2021-05-24 PROCEDURE — 36415 COLL VENOUS BLD VENIPUNCTURE: CPT

## 2021-05-24 PROCEDURE — 80053 COMPREHEN METABOLIC PANEL: CPT

## 2021-05-24 PROCEDURE — 74011250636 HC RX REV CODE- 250/636: Performed by: HOSPITALIST

## 2021-05-24 RX ORDER — FAMOTIDINE 20 MG/1
40 TABLET, FILM COATED ORAL
Status: COMPLETED | OUTPATIENT
Start: 2021-05-24 | End: 2021-05-24

## 2021-05-24 RX ORDER — SODIUM CHLORIDE 9 MG/ML
75 INJECTION, SOLUTION INTRAVENOUS CONTINUOUS
Status: DISCONTINUED | OUTPATIENT
Start: 2021-05-24 | End: 2021-05-25

## 2021-05-24 RX ADMIN — GABAPENTIN 1200 MG: 400 CAPSULE ORAL at 21:34

## 2021-05-24 RX ADMIN — ONDANSETRON 4 MG: 2 INJECTION INTRAMUSCULAR; INTRAVENOUS at 15:13

## 2021-05-24 RX ADMIN — GABAPENTIN 1200 MG: 400 CAPSULE ORAL at 15:31

## 2021-05-24 RX ADMIN — ONDANSETRON 4 MG: 2 INJECTION INTRAMUSCULAR; INTRAVENOUS at 03:30

## 2021-05-24 RX ADMIN — POTASSIUM CHLORIDE 40 MEQ: 750 TABLET, FILM COATED, EXTENDED RELEASE ORAL at 09:18

## 2021-05-24 RX ADMIN — ACETYLCYSTEINE 11180 MG: 200 INJECTION, SOLUTION INTRAVENOUS at 15:30

## 2021-05-24 RX ADMIN — ONDANSETRON 4 MG: 2 INJECTION INTRAMUSCULAR; INTRAVENOUS at 21:39

## 2021-05-24 RX ADMIN — GABAPENTIN 1200 MG: 400 CAPSULE ORAL at 09:18

## 2021-05-24 RX ADMIN — Medication 10 ML: at 03:41

## 2021-05-24 RX ADMIN — Medication 10 ML: at 16:37

## 2021-05-24 RX ADMIN — TOPIRAMATE 50 MG: 25 TABLET, FILM COATED ORAL at 21:34

## 2021-05-24 RX ADMIN — Medication 10 ML: at 21:35

## 2021-05-24 RX ADMIN — HYDROMORPHONE HYDROCHLORIDE 0.5 MG: 1 INJECTION, SOLUTION INTRAMUSCULAR; INTRAVENOUS; SUBCUTANEOUS at 15:31

## 2021-05-24 RX ADMIN — POTASSIUM CHLORIDE 40 MEQ: 750 TABLET, FILM COATED, EXTENDED RELEASE ORAL at 19:21

## 2021-05-24 RX ADMIN — FAMOTIDINE 40 MG: 20 TABLET, FILM COATED ORAL at 09:18

## 2021-05-24 RX ADMIN — HYDROMORPHONE HYDROCHLORIDE 0.5 MG: 1 INJECTION, SOLUTION INTRAMUSCULAR; INTRAVENOUS; SUBCUTANEOUS at 21:34

## 2021-05-24 RX ADMIN — HYDROMORPHONE HYDROCHLORIDE 0.5 MG: 1 INJECTION, SOLUTION INTRAMUSCULAR; INTRAVENOUS; SUBCUTANEOUS at 09:26

## 2021-05-24 RX ADMIN — HYDROMORPHONE HYDROCHLORIDE 0.5 MG: 1 INJECTION, SOLUTION INTRAMUSCULAR; INTRAVENOUS; SUBCUTANEOUS at 03:30

## 2021-05-24 RX ADMIN — TRAMADOL HYDROCHLORIDE 50 MG: 50 TABLET, COATED ORAL at 01:10

## 2021-05-24 RX ADMIN — SODIUM CHLORIDE 100 ML/HR: 9 INJECTION, SOLUTION INTRAVENOUS at 15:15

## 2021-05-24 RX ADMIN — TRAMADOL HYDROCHLORIDE 50 MG: 50 TABLET, COATED ORAL at 19:24

## 2021-05-24 NOTE — PROGRESS NOTES
Transition of Care    RUR: 8%  Disposition: home with family assist  Follow up: hepatology, pcp  Transport: Medicaid cab      Reason for Admission:  Liver failure; history of chronic hepatitis, hep C cirrhosis, depression, GERD, PCOS. RUR Score:   8%                  Plan for utilizing home health:  Not needed at this time     PCP: First and Last name:  Mehdi Parker MD     Name of Practice:    Are you a current patient: Yes/No:    Approximate date of last visit:    Can you participate in a virtual visit with your PCP:                     Current Advanced Directive/Advance Care Plan: Full Code      Healthcare Decision Maker:   Click here to complete [address] including selection of the Healthcare Decision Maker Relationship (ie \"Primary\")                             Transition of Care Plan:     CM met with patient at the bedside to introduce self and role. Patient is alert and oriented x4, provided CM with the correct demographic information and CM will make those changes in the system. Patient lives with her  in [place], stated she does not currently have a phone so her  will be the main contact Cara De La Rosa, [phone]). Patient stated that her hematologist mentioned possible discharge tomorrow (Tuesday). CM inquired about transportation home; patient stated her  did not have a car so she would need a medicaid cab (specifically mentioned [address]). CM will assist with transport when appropriate. Care Management Interventions  PCP Verified by CM:  Yes  Palliative Care Criteria Met (RRAT>21 & CHF Dx)?: No  MyChart Signup: No  Discharge Durable Medical Equipment: No  Health Maintenance Reviewed: Yes  Physical Therapy Consult: No  Occupational Therapy Consult: No  Speech Therapy Consult: No  Current Support Network: Lives with Spouse  Confirm Follow Up Transport: Family  Discharge Location  Discharge Placement: Home with family assistance    SLeo  [employer] , MARIA EUGENIA-VALERY

## 2021-05-24 NOTE — PROGRESS NOTES
Nydia Sorensen MD, Dee Dee Carey MD, MPH      YOBANY Whalen, [facility]     April S Christine [facility]   EDDA Ignacio-FRANDY Tamayo [facility]       Zay Redman [address] 136    at [address], [facility] Ina Thompson  22.    [phone]    FAX: [address] [place] [address], [address]    [phone]    FAX: [phone]       HEPATOLOGY PROGRESS NOTE  Rogelio Galdamez is a 41year old female who developed acute icteric hepatitis in 5/2015. Anti-HCV and HCV RNA was positive at that time with genotype 1B. The liver enzymes were coming down and repeat HCV RNA in 10/2015 was undetectable. She did well for the past 6 years. She presented to OSH for RUQ pain associated with nausea, vomiting and jaundice. She was noted to have markedly elevated liver transaminases into the 6259-2383 range with TBILI of 12 mg. She denies IVD abuse or alcohol use. She denies using acetaminophen based medications. She denies use of herbal medication. She recently had tattoo's while incarcerated about 8 months ago. CT of the abdomen /pelvis performed at [facility] showed hepatitis, steatosis, splenomegaly, cholecystectomy, and peripancreatic and portacaval lymph nodes likley related to hepatitis. Since hospital admission  She was started on IV NAC and I.V vitamin K.  [facility]atory [facility] at [facility] showed ALT 3,483, AST > 2000, .  TBILI 11 mg, INR 1.8  Labs today- ALT 1,215, , , INR 1.4  She remains on I.V NAC  Serology for HCV RNA is positive  We will wait another day or two prior to hospital discharge  Consider IVF hydration due to low blood pressure    Plan for today:  Continue IV NAC until transaminase < 800's  Continue IV vitamin K for 3 total doses. Monitor hepatic panel and INR QD  Suspect this is another episode of acute HCV. HCV never causes liver failure and so the live enzymes and INR should come down nicely. Start IVF       ASSESSMENT AND PLAN:  Acute Hepatitis C   She had an episode of what appeared to be acute HCV in 2015. The risk factor for acute HCV back then was using a shared razor. HCV RNA then became undetectable in 10/2015 and it appeared that she achieved spontaneous resolution. She was found to have elevated liver enzymes in 6/2020 and HCV RNA at that time was negative x 2. This is all consistent with spontaneous resolution of HCV. She now has another episode of what appears to be acute HCV. HCV RNA does not lay dormant and then flair like HBV does. Will test for HCV genotype. IF this is not 1B this is a new HCV infection. Having more than one episode of acute HCV is certainly possible since there are no protective antibodies to HCV. What is unusual is that she denies any risk factors for HCV. Risk factors for recent HCV infection is recent incarceration and Tattoo's      Will continue to monitor and if this does resolve will treat with DAA therapy in 3-6 months. .     Acute hepatitis  This appears to be to due to HCV. HCV RNA was documentned to be negative in 2020  HCV RNA is now positive. Serology for other causes of acute and chronic liver disease are negative so far. Await autoimmune serology. Will continue I.V NAC  Will continue IV Vitamin for for a total of 3 doses. NO opioids, or sedating medications    Check Urine toxicology screen    Coagulopathy  This is due to acute hepatitis. S/p IV Vitamin K  INR is coming down. PHYSICAL EXAMINATION:  VS: per nursing note  General: NO acute distress. Eyes: Sclera icteric. ENT: No oral lesions. Thyroid normal.  Nodes: No adenopathy.    Skin: No spider angiomata. Jaundiced. Respiratory: Lungs clear to auscultation. Cardiovascular: Regular heart rate. No murmurs. No JVD. Abdomen: Soft, RUQ tenderness. Liver size normal to percussion/palpation. Spleen not palpable. No obvious ascites. Extremities: No edema. No muscle wasting. No gross arthritic changes. Neurologic: Alert and oriented. Cranial nerves grossly intact. No asterixis.     LABORATORY:    Results for Mardene Lundborg (MRN [id number]) as of 5/24/2021 10:03   Ref. Range 5/23/2021 04:04 5/24/2021 03:29   WBC Latest Ref Range: 3.6 - 11.0 K/uL 4.7 4.5   HGB Latest Ref Range: 11.5 - 16.0 g/dL 12.2 11.5   PLATELET Latest Ref Range: 150 - 400 K/uL 154 156   INR Latest Ref Range: 0.9 - 1.1   1.6 (H) 1.4 (H)   Prothrombin time Latest Ref Range: 9.0 - 11.1 sec 15.9 (H) 14.6 (H)   Sodium Latest Ref Range: 136 - 145 mmol/L 137 137   Potassium Latest Ref Range: 3.5 - 5.1 mmol/L 3.2 (L) 4.1   CO2 Latest Ref Range: 21 - 32 mmol/L 24 23   Glucose Latest Ref Range: 65 - 100 mg/dL 142 (H) 132 (H)   BUN Latest Ref Range: 6 - 20 MG/DL 7 7   Creatinine Latest Ref Range: 0.55 - 1.02 MG/DL 0.97 0.90   Bilirubin, total Latest Ref Range: 0.2 - 1.0 MG/DL 11.1 (H) 10.3 (H)   Albumin Latest Ref Range: 3.5 - 5.0 g/dL 2.2 (L) 2.1 (L)   ALT Latest Ref Range: 12 - 78 U/L 1,756 (H) 1,215 (H)   AST Latest Ref Range: 15 - 37 U/L 893 (H) 442 (H)   Alk. phosphatase Latest Ref Range: 45 - 117 U/L 145 (H) 142 (H)       RADIOLOGY:  Ultrasound of liver. Enlarged with somewhat nodular appearing liver c/w chronic liver disease. No liver mass lesions. Normal PV flow.       Harry Finch MD, MPH  Advanced Hepatology  [facility] [address] [address] [place], [address] [facility] 22.  [phone]  [facility] [address]

## 2021-05-24 NOTE — PROGRESS NOTES
Verbal report given to John Adam for transfer of care. ofpt. Reviewed SBAR, MAR,Kardex,I&O. Assessment    and plan of care

## 2021-05-24 NOTE — PROGRESS NOTES
Bedside and Verbal shift change report given to Redd Hutchison RN (oncoming nurse) by Deandra Barone RN (offgoing nurse). Report included the following information SBAR, Kardex, ED Summary, Intake/Output, MAR and Recent Results.

## 2021-05-24 NOTE — PROGRESS NOTES
[facility]                                                                                          Hospitalist Progress Note  Scarlett Choi MD  Answering service: [phone] OR 36 from in house phone        Date of Service:  2021  NAME:  Eula Mckeon  :  1980  MRN:  [id number]      Admission Summary:   Eula Mckeon is a 41 y.o. female with pmhx hep C cirrhosis, depression, GERD, dyslipidemia and PCOS who presents as a transfer from [facility] for liver failure. She reports 4 days of RUQ/epigastric pain associated with nausea and vomiting. She was seen at [facility], and found to have markedly elevated LFTs. She was treated with NAC, and vitamin K, and transferred to [facility] for hepatology evaluation. She denies ETOH, dietary supplements, rec drug use, or acetaminophen use. She does have a hx chronic hepatitis, and  hepatitis C which patient states was \"gone\" without tx. She was followed by Dr. Marilia Goldberg with last visits documented in .       Interval history / Subjective:   Patient seen and examined   Tolerating diet      Assessment & Plan:     #Acute Hepatitis C  #Acute Liver Failure   -unknown etiology, ETOH, acetaminophen, UDS negative, denies use of supplements  -hx hep C -/autoimmune work-up  -per hepatology, c/w NAC and Vit K   -Hep C Reactive,   -daily labs   -hepatology following   -Ct abd/pel neg     #Anxiety  #Neuropathic pain  #chronic pain  -Tramadol q6h    Code status: Full  DVT prophylaxis: SCD    Care Plan discussed with: Patient/Family and Nurse  Anticipated Disposition: Home w/Family  Anticipated Discharge: 24 hours to 48 hours DC when transaminase < [address] Problems  Date Reviewed: 2016        Codes Class Noted POA    L[facility] ICD-10-CM: K72.90  ICD-9-CM: 572.8  2021 Unknown        Severe protein-calorie malnutrition ([facility] ICD-10-CM: Z76  ICD-9-CM: 638  2021 Yes                Review of Systems:   A comprehensive review of systems was negative. Vital Signs:    Last 24hrs VS reviewed since prior progress note. Most recent are:  Visit Vitals  BP (!) 96/57   Pulse 69   Temp 98.9 °F (37.2 °C)   Resp 18   Ht 5' 7\" (1.702 m)   Wt 76 kg (167 lb 8.8 oz)   SpO2 97%   BMI 26.24 kg/m²         Intake/Output Summary (Last 24 hours) at 5/24/2021 1226  Last data filed at 5/23/2021 1955  Gross per 24 hour   Intake 240 ml   Output 450 ml   Net -210 ml        Physical Examination:     I had a face to face encounter with this patient and independently examined them on 5/24/2021 as outlined below:          Constitutional:  No acute distress, complaining of right upper quadrant pain tattoos all over   HEENT:  Oral mucosa moist, sclera icteric    Resp:  CTA bilaterally. CV:  Regular rhythm, normal rate    GI:  Soft, tenderness in lower abdomen with rebound    Musculoskeletal:  No edema, warm, 2+ pulses throughout    Neurologic:  Moves all extremities. AAOx3, CN II-XII reviewed     SKIN Jaundice        Data Review:    I personally reviewed  Image and labs    CT ABD PELV W CONT    Result Date: 5/22/2021  1. Diffuse fatty liver 2. Trace bilateral pleural effusions and basilar atelectasis    US ABD COMP    Result Date: 5/21/2021  1. Upper normal liver size with micronodular capsular configuration, suggesting chronic liver disease. No focal abnormality to suggest neoplasm. 2. Splenomegaly. 3. Increased echogenicity of both kidneys, suggesting medical renal parenchymal disease. Correlate with renal function parameters. 4. Status post cholecystectomy with no biliary ductal dilatation.     Labs:     Recent Labs     05/24/21  0329 05/23/21  0404   WBC 4.5 4.7   HGB 11.5 12.2   HCT 35.7 37.4    154     Recent Labs     05/24/21  0329 05/23/21  0404 05/22/21  0219    137 141   K 4.1 3.2* 3.5    106 106   CO2 23 24 27   BUN 7 7 8   CREA 0.90 0.97 0.86   * 142* 64*   CA 7.9* 7.9* 8.0*     Recent Labs     05/24/21 0329 05/23/21  0404 05/22/21  0219   ALT 1,215* 1,756* 2,718*   * 145* 140*   TBILI 10.3* 11.1* 11.6*   TP 6.2* 6.2* 6.4   ALB 2.1* 2.2* 2.5*   GLOB 4.1* 4.0 3.9     Recent Labs     05/24/21 0329 05/23/21  0404 05/22/21  0219   INR 1.4* 1.6* 1.9*   PTP 14.6* 15.9* 19.2*      No results for input(s): FE, TIBC, PSAT, FERR in the last 72 hours. No results found for: FOL, RBCF   No results for input(s): PH, PCO2, PO2 in the last 72 hours. No results for input(s): CPK, CKNDX, TROIQ in the last 72 hours.     No lab exists for component: CPKMB  No results found for: CHOL, CHOLX, CHLST, CHOLV, HDL, HDLP, LDL, LDLC, DLDLP, TGLX, TRIGL, TRIGP, CHHD, CHHDX  Lab Results   Component Value Date/Time    Glucose (POC) 102 (H) 05/26/2015 05:49 PM    Glucose (POC) 88 05/26/2015 06:01 AM    Glucose (POC) 122 (H) 05/26/2015 12:03 AM    Glucose (POC) 123 (H) 05/25/2015 05:11 PM    Glucose (POC) 85 05/25/2015 11:12 AM     No results found for: COLOR, APPRN, SPGRU, REFSG, GARCIA, PROTU, GLUCU, KETU, BILU, UROU, FABIANA, LEUKU, GLUKE, EPSU, BACTU, WBCU, RBCU, CASTS, UCRY      Medications Reviewed:     Current Facility-Administered Medications   Medication Dose Route Frequency    0.9% sodium chloride infusion  100 mL/hr IntraVENous CONTINUOUS    potassium chloride SR (KLOR-CON 10) tablet 40 mEq  40 mEq Oral BID    alum-mag hydroxide-simeth (MYLANTA) oral suspension 30 mL  30 mL Oral Q4H PRN    HYDROmorphone (PF) (DILAUDID) injection 0.5 mg  0.5 mg IntraVENous Q6H PRN    sodium chloride (NS) flush 5-40 mL  5-40 mL IntraVENous Q8H    sodium chloride (NS) flush 5-40 mL  5-40 mL IntraVENous PRN    ondansetron (ZOFRAN ODT) tablet 4 mg  4 mg Oral Q8H PRN    Or    ondansetron (ZOFRAN) injection 4 mg  4 mg IntraVENous Q6H PRN    acetylcysteine (ACETADOTE) 11,180 mg in dextrose 5% 1,000 mL infusion  150 mg/kg IntraVENous Q24H    gabapentin (NEURONTIN) capsule 1,200 mg  1,200 mg Oral TID    topiramate (TOPAMAX) tablet 50 mg  50 mg Oral QHS    traMADoL (ULTRAM) tablet 50 mg  50 mg Oral Q6H PRN     ______________________________________________________________________  EXPECTED LENGTH OF STAY: 4d 16h  ACTUAL LENGTH OF STAY:          4    Total time spent: 35 minutes                Pablo Ren MD

## 2021-05-25 VITALS
HEART RATE: 67 BPM | BODY MASS INDEX: 26.3 KG/M2 | DIASTOLIC BLOOD PRESSURE: 69 MMHG | HEIGHT: 67 IN | SYSTOLIC BLOOD PRESSURE: 103 MMHG | RESPIRATION RATE: 17 BRPM | OXYGEN SATURATION: 98 % | TEMPERATURE: 97.5 F | WEIGHT: 167.55 LBS

## 2021-05-25 LAB
ALBUMIN SERPL-MCNC: 2.2 G/DL (ref 3.5–5)
ALBUMIN/GLOB SERPL: 0.5 {RATIO} (ref 1.1–2.2)
ALP SERPL-CCNC: 142 U/L (ref 45–117)
ALT SERPL-CCNC: 898 U/L (ref 12–78)
ANION GAP SERPL CALC-SCNC: 5 MMOL/L (ref 5–15)
AST SERPL-CCNC: 245 U/L (ref 15–37)
BILIRUB SERPL-MCNC: 10.4 MG/DL (ref 0.2–1)
BUN SERPL-MCNC: 10 MG/DL (ref 6–20)
BUN/CREAT SERPL: 13 (ref 12–20)
CALCIUM SERPL-MCNC: 8 MG/DL (ref 8.5–10.1)
CHLORIDE SERPL-SCNC: 107 MMOL/L (ref 97–108)
CO2 SERPL-SCNC: 24 MMOL/L (ref 21–32)
COMMENT, HOLDF: NORMAL
CREAT SERPL-MCNC: 0.8 MG/DL (ref 0.55–1.02)
GLOBULIN SER CALC-MCNC: 4.2 G/DL (ref 2–4)
GLUCOSE SERPL-MCNC: 105 MG/DL (ref 65–100)
INR PPP: 1.2 (ref 0.9–1.1)
POTASSIUM SERPL-SCNC: 4.1 MMOL/L (ref 3.5–5.1)
PROT SERPL-MCNC: 6.4 G/DL (ref 6.4–8.2)
PROTHROMBIN TIME: 12.7 SEC (ref 9–11.1)
SAMPLES BEING HELD,HOLD: NORMAL
SODIUM SERPL-SCNC: 136 MMOL/L (ref 136–145)

## 2021-05-25 PROCEDURE — 74011250637 HC RX REV CODE- 250/637: Performed by: NURSE PRACTITIONER

## 2021-05-25 PROCEDURE — 80053 COMPREHEN METABOLIC PANEL: CPT

## 2021-05-25 PROCEDURE — 74011250636 HC RX REV CODE- 250/636: Performed by: HOSPITALIST

## 2021-05-25 PROCEDURE — 99232 SBSQ HOSP IP/OBS MODERATE 35: CPT | Performed by: HOSPITALIST

## 2021-05-25 PROCEDURE — 36415 COLL VENOUS BLD VENIPUNCTURE: CPT

## 2021-05-25 PROCEDURE — 74011250637 HC RX REV CODE- 250/637: Performed by: FAMILY MEDICINE

## 2021-05-25 PROCEDURE — 74011250636 HC RX REV CODE- 250/636: Performed by: FAMILY MEDICINE

## 2021-05-25 PROCEDURE — 74011250636 HC RX REV CODE- 250/636: Performed by: NURSE PRACTITIONER

## 2021-05-25 PROCEDURE — 85610 PROTHROMBIN TIME: CPT

## 2021-05-25 PROCEDURE — 74011250637 HC RX REV CODE- 250/637: Performed by: INTERNAL MEDICINE

## 2021-05-25 PROCEDURE — 74011250637 HC RX REV CODE- 250/637: Performed by: HOSPITALIST

## 2021-05-25 RX ORDER — OXYCODONE HYDROCHLORIDE 5 MG/1
5 TABLET ORAL
Qty: 9 TABLET | Refills: 0 | Status: SHIPPED | OUTPATIENT
Start: 2021-05-25 | End: 2021-05-28

## 2021-05-25 RX ORDER — OXYCODONE HYDROCHLORIDE 5 MG/1
5 TABLET ORAL
Status: DISCONTINUED | OUTPATIENT
Start: 2021-05-25 | End: 2021-05-25 | Stop reason: HOSPADM

## 2021-05-25 RX ADMIN — POTASSIUM CHLORIDE 40 MEQ: 750 TABLET, FILM COATED, EXTENDED RELEASE ORAL at 08:53

## 2021-05-25 RX ADMIN — HYDROMORPHONE HYDROCHLORIDE 0.5 MG: 1 INJECTION, SOLUTION INTRAMUSCULAR; INTRAVENOUS; SUBCUTANEOUS at 10:35

## 2021-05-25 RX ADMIN — ONDANSETRON 4 MG: 2 INJECTION INTRAMUSCULAR; INTRAVENOUS at 04:39

## 2021-05-25 RX ADMIN — OXYCODONE 5 MG: 5 TABLET ORAL at 14:27

## 2021-05-25 RX ADMIN — HYDROMORPHONE HYDROCHLORIDE 0.5 MG: 1 INJECTION, SOLUTION INTRAMUSCULAR; INTRAVENOUS; SUBCUTANEOUS at 04:39

## 2021-05-25 RX ADMIN — GABAPENTIN 1200 MG: 400 CAPSULE ORAL at 08:53

## 2021-05-25 RX ADMIN — ONDANSETRON 4 MG: 4 TABLET, ORALLY DISINTEGRATING ORAL at 10:34

## 2021-05-25 RX ADMIN — SODIUM CHLORIDE 75 ML/HR: 9 INJECTION, SOLUTION INTRAVENOUS at 04:36

## 2021-05-25 NOTE — DISCHARGE SUMMARY
[facility]  Hospitalist Group           Discharge Summary      Name: Eve Munoz  [id number]  YOB: 1980 (Age: 41 y.o.)   Date of Admission: 5/20/2021  Date of Discharge: 5/25/2021  Attending Physician: Jose Armando Lacey MD    Discharge Diagnosis:   Acute hepatitis due to HCV  Coagulopathy, due to acute hepatitis   Anxiety  Neuropathic pain  Chronic pain    Consultations:  IP CONSULT TO HEPATOLOGY    Procedures: none    Brief Admission History/Reason for Admission Per Shi Marcelo MD:      Eve Munoz is a 41 y.o. female with pmhx hep C cirrhosis, depression, GERD, dyslipidemia and PCOS who presents as a transfer from [facility] for liver failure. She reports 4 days of RUQ/epigastric pain associated with nausea and vomiting. She was seen at [facility], and found to have markedly elevated LFTs. She was treated with NAC, and vitamin K, and transferred to [facility] for hepatology evaluation. She denies ETOH, dietary supplements, rec drug use, or acetaminophen use. She does have a hx chronic hepatitis, and  hepatitis C that she reports was cured. She was followed by Dr. Harman Osman with last visits documented in 2015.       Upon arrival to floor, her VSS. Initial labs at [facility] showed marked transaminitis with AST >2,000, and ALT 3.483, T bili 11.4, AlkP 146, and albumin 2.1. ETOH and UDS were negative and acetaminophen level was negative. CT abdomen/pelvis done at [facility] showed hepatitis, steatosis, splenomegaly, cholecystectomy, and peripancreatic and portacaval lymph nodes likley related to hepatitis.     She has received dilaudid for pain, NAC, and IV vitamin K.    [address] Course by Main Problems:   Acute hepatitis due to HCV  Coagulopathy, due to acute hepatitis  CT a/p showed diffuse fatty liver. Trace b/l pleural effusions and basilar atelectasis  HCV RNA was positive on admission. Serology for other causes of acute/chronic liver disease neg thus far.   Autoimmune work up is pending. Patient was treated with IV NAD x4 days, IV Vit K for 3 doses. LFTs and INR trending down. Pt remains hemodynamically stable this AM.  Today patient is seen and examined, his vital signs are stable, his lab work is stable and he was cleared by all consultant parties including hepatology/Dr Wheeler to be discharged for outpatient follow-up. Anxiety  Neuropathic pain  chronic pain  Resume home meds. Discharge Exam:  Patient seen and examined by me on discharge day. Pertinent Findings:  Visit Vitals  /69 (BP 1 Location: Right upper arm, BP Patient Position: At rest;Sitting)   Pulse 67   Temp 97.5 °F (36.4 °C)   Resp 17   Ht 5' 7\" (1.702 m)   Wt 76 kg (167 lb 8.8 oz)   SpO2 98%   BMI 26.24 kg/m²     Gen:    Not in distress  Chest: Clear lungs  CVS:   Regular rhythm. No edema  Abd:  Soft, not distended, not tender    Discharge/Recent Laboratory Results:  Recent Labs     05/25/21  0442      K 4.1      CO2 24   BUN 10   *   CA 8.0*     Recent Labs     05/24/21  0329   HGB 11.5   HCT 35.7   WBC 4.5          Discharge Medications:  Current Discharge Medication List      START taking these medications    Details   oxyCODONE IR (ROXICODONE) 5 mg immediate release tablet Take 1 Tablet by mouth every eight (8) hours as needed for Pain for up to 3 days. Max Daily Amount: 15 mg.  Qty: 9 Tablet, Refills: 0  Start date: 5/25/2021, End date: 5/28/2021    Associated Diagnoses: Pain of upper abdomen         CONTINUE these medications which have NOT CHANGED    Details   amphetamine-dextroamphetamine XR (Adderall XR) 10 mg XR capsule Take 10 mg by mouth daily. topiramate (Topamax) 50 mg tablet Take  by mouth nightly. busPIRone (BUSPAR) 10 mg tablet Take 10 mg by mouth two (2) times a day. ibuprofen (MOTRIN) 800 mg tablet Take  by mouth two (2) times daily as needed for Pain.  Indications: pain      peg-electrolyte soln (GAVILYTE-N) 420 gram solution Take as directed Indications: BOWEL EVACUATION  Qty: 1 Bottle, Refills: 0      gabapentin (NEURONTIN) 100 mg capsule Take 1,200 mg by mouth three (3) times daily. Indications: neuropathic pain      Omeprazole delayed release (PRILOSEC D/R) 20 mg tablet Take two tabs daily  Indications: GASTROESOPHAGEAL REFLUX  Qty: 60 Tab, Refills: 2         STOP taking these medications       traMADol (ULTRAM) 50 mg tablet Comments:   Reason for Stopping:               Patient Follow Up Instructions:    Activity: Activity as tolerated  Diet: Regular Diet  Wound Care: None needed  Code: Full    DISPOSITION:    Home with Family: x   Home with HH/PT/OT/RN:    SNF/LTC:    MARIA EUGENIA:    OTHER:          Follow up with:   PCP : Luis A Moeller MD  Follow-up Information     Follow up With Specialties Details Why Contact Info    Erasto Patterson MD Hepatology In 1 week  [facility]  [phone] Ketty Tam MD Physical Medicine and Rehabilitation In 3 days  [address] [place] [id number] [phone]              Total time in minutes spent coordinating this discharge (includes going over instructions, follow-up, prescriptions, and preparing report for sign off to her PCP) : 35 minutes

## 2021-05-25 NOTE — PROGRESS NOTES
Bedside shift change report given to Celeste Jacobo (oncoming nurse) by Christina Mckeon RN (offgoing nurse). Report included the following information SBAR, Kardex, Intake/Output, MAR and Recent Results.

## 2021-05-25 NOTE — PROGRESS NOTES
Transition of Care    RUR: 8%  Disposition: home  Follow up: pcp  Transport: cab    CM informed that patient is ready for discharge today, and needs transport home. CM was also informed that patient was requesting assistance with a new PCP. CM met with patient to follow up. Patient confirmed she did currently have a PCP but the doctor was too far away from her home. Patient does not live in [place], [facility] asked her if she would like a list of PCP's in [place], but that would not be of help to her. Because of patient's medicaid policy, CM suggested she call the phone number on the back of her insurance card to find out who accepts it. CM contacted [facility] transport ([phone]), usual 3 hour window, ETA 2:30 pm, confirmation # [id number].     Geneva MARTINEZ, ACM-SW

## 2021-05-25 NOTE — PROGRESS NOTES
Richy Hooker MD, Og Mack MD, MPH      Danita Holter, YOBANY Kwon, [facility]     April S Christine, [facility]   EDDA Sosa-FRANDY Brooks, [facility]       Zay Redman [address]    at [address], [place] Ina Thompson  22.    [phone]    FAX: [address] [place] [address]    at [address], [address]    [phone]    FAX: [phone]       HEPATOLOGY PROGRESS NOTE  Jacques Carson is a 41year old female who developed acute icteric hepatitis in 5/2015. Anti-HCV and HCV RNA was positive at that time with genotype 1B. The liver enzymes were coming down and repeat HCV RNA in 10/2015 was undetectable. She did well for the past 6 years. She presented to OSH for RUQ pain associated with nausea, vomiting and jaundice. She was noted to have markedly elevated liver transaminases into the 1717-6943 range with TBILI of 12 mg. She denies IVD abuse or alcohol use. She denies using acetaminophen based medications. She denies use of herbal medication. She recently had tattoo's while incarcerated about 8 months ago. CT of the abdomen /pelvis performed at [facility] showed hepatitis, steatosis, splenomegaly, cholecystectomy, and peripancreatic and portacaval lymph nodes likley related to hepatitis. Since hospital admission  She was started on IV NAC and I.V vitamin K.  [facility] showed ALT 3,483, AST > 2000, . TBILI 11 mg, INR 1.8  Labs today- ALT 1,215, , , INR 1.4  She remains on I.V NAC  Serology for HCV RNA is positive  Liver enzymes are now in the upper 800's    Plan for today:   It is OK to discharge patient with outpatient follow up with hepatology next week       ASSESSMENT AND PLAN:  Acute Hepatitis C   She had an episode of what appeared to be acute HCV in 2015. The risk factor for acute HCV back then was using a shared razor. HCV RNA then became undetectable in 10/2015 and it appeared that she achieved spontaneous resolution. She was found to have elevated liver enzymes in 6/2020 and HCV RNA at that time was negative x 2. This is all consistent with spontaneous resolution of HCV. She now has another episode of what appears to be acute HCV. HCV RNA does not lay dormant and then flair like HBV does. Will test for HCV genotype. IF this is not 1B this is a new HCV infection. Having more than one episode of acute HCV is certainly possible since there are no protective antibodies to HCV. What is unusual is that she denies any risk factors for HCV. Risk factors for recent HCV infection is recent incarceration and Tattoo's      Will continue to monitor and if this does resolve will treat with DAA therapy in 3-6 months. .     Acute hepatitis  This appears to be to due to HCV. HCV RNA was documentned to be negative in 2020  HCV RNA is now positive. Serology for other causes of acute and chronic liver disease are negative so far. Await autoimmune serology. Will continue I.V NAC  Will continue IV Vitamin for for a total of 3 doses. NO opioids, or sedating medications    Check Urine toxicology screen    Coagulopathy  This is due to acute hepatitis. S/p IV Vitamin K  INR is coming down. PHYSICAL EXAMINATION:  VS: per nursing note  General: NO acute distress. Eyes: Sclera icteric. ENT: No oral lesions. Thyroid normal.  Nodes: No adenopathy. Skin: No spider angiomata. Jaundiced. Respiratory: Lungs clear to auscultation. Cardiovascular: Regular heart rate. No murmurs. No JVD. Abdomen: Soft, RUQ tenderness. Liver size normal to percussion/palpation. Spleen not palpable. No obvious ascites.   Extremities: No edema. No muscle wasting. No gross arthritic changes. Neurologic: Alert and oriented. Cranial nerves grossly intact. No asterixis.     LABORATORY:  Results for Mathew Cleveland (MRN [id number]) as of 5/25/2021 10:16   Ref. Range 5/24/2021 03:29 5/25/2021 04:42   WBC Latest Ref Range: 3.6 - 11.0 K/uL 4.5    HGB Latest Ref Range: 11.5 - 16.0 g/dL 11.5    PLATELET Latest Ref Range: 150 - 400 K/uL 156    INR Latest Ref Range: 0.9 - 1.1   1.4 (H) 1.2 (H)   Prothrombin time Latest Ref Range: 9.0 - 11.1 sec 14.6 (H) 12.7 (H)   Sodium Latest Ref Range: 136 - 145 mmol/L 137 136   Potassium Latest Ref Range: 3.5 - 5.1 mmol/L 4.1 4.1   Chloride Latest Ref Range: 97 - 108 mmol/L 107 107   CO2 Latest Ref Range: 21 - 32 mmol/L 23 24   Anion gap Latest Ref Range: 5 - 15 mmol/L 7 5   Glucose Latest Ref Range: 65 - 100 mg/dL 132 (H) 105 (H)   BUN Latest Ref Range: 6 - 20 MG/DL 7 10   Creatinine Latest Ref Range: 0.55 - 1.02 MG/DL 0.90 0.80   Bilirubin, total Latest Ref Range: 0.2 - 1.0 MG/DL 10.3 (H) 10.4 (H)   Protein, total Latest Ref Range: 6.4 - 8.2 g/dL 6.2 (L) 6.4   Albumin Latest Ref Range: 3.5 - 5.0 g/dL 2.1 (L) 2.2 (L)   ALT Latest Ref Range: 12 - 78 U/L 1,215 (H) 898 (H)   AST Latest Ref Range: 15 - 37 U/L 442 (H) 245 (H)   Alk. phosphatase Latest Ref Range: 45 - 117 U/L 142 (H) 142 (H)       RADIOLOGY:  Ultrasound of liver. Enlarged with somewhat nodular appearing liver c/w chronic liver disease. No liver mass lesions. Normal PV flow.       Ulysses Breslow, MD, MPH  Advanced Hepatology  [facility] [address] [address] Cristobal Tam, [address] [facility] 22.  [phone]  [address] [facility]

## 2021-05-25 NOTE — PROGRESS NOTES
Rubina Lopez MD, patient's attending, stated that patient did not have medical reason to be discharged with pain med prescription. Patient requested I contact Melvin Martines MD to request pain medication prescription for discharge. Melvin Martines MD contacted via [employer] relaying above information. I have reviewed discharge instructions with the patient. The patient verbalized understanding. Discharge medications reviewed with patient and appropriate educational materials and side effects teaching were provided. Patient escorted to  set-up ride with wheelchair by volunteer services. Patient expressing concern about managing pain outpatient. Patient is tearful, stated that Melvin Martines MD taught her not to take OTC pain medication. Melvin Martines MD contacted via Zadby with patient's concerns.

## 2022-03-19 PROBLEM — E43 SEVERE PROTEIN-CALORIE MALNUTRITION (HCC): Status: ACTIVE | Noted: 2021-05-21

## 2022-03-19 PROBLEM — K72.90 LIVER FAILURE (HCC): Status: ACTIVE | Noted: 2021-05-21

## 2023-09-01 ENCOUNTER — HOSPITAL ENCOUNTER (EMERGENCY)
Facility: HOSPITAL | Age: 43
Discharge: ANOTHER HOSPITAL | End: 2023-09-02
Attending: EMERGENCY MEDICINE
Payer: MEDICAID

## 2023-09-01 ENCOUNTER — APPOINTMENT (OUTPATIENT)
Facility: HOSPITAL | Age: 43
End: 2023-09-01
Payer: MEDICAID

## 2023-09-01 DIAGNOSIS — R65.20 SEPSIS WITH ACUTE ORGAN DYSFUNCTION WITHOUT SEPTIC SHOCK, DUE TO UNSPECIFIED ORGANISM, UNSPECIFIED TYPE (HCC): ICD-10-CM

## 2023-09-01 DIAGNOSIS — M46.46 DISCITIS OF LUMBAR REGION: Primary | ICD-10-CM

## 2023-09-01 DIAGNOSIS — A41.9 SEPSIS WITH ACUTE ORGAN DYSFUNCTION WITHOUT SEPTIC SHOCK, DUE TO UNSPECIFIED ORGANISM, UNSPECIFIED TYPE (HCC): ICD-10-CM

## 2023-09-01 LAB
ALBUMIN SERPL-MCNC: 3.1 G/DL (ref 3.5–5)
ALBUMIN/GLOB SERPL: 0.6 (ref 1.1–2.2)
ALP SERPL-CCNC: 171 U/L (ref 45–117)
ALT SERPL-CCNC: 30 U/L (ref 12–78)
ANION GAP SERPL CALC-SCNC: 12 MMOL/L (ref 5–15)
APPEARANCE UR: CLEAR
AST SERPL-CCNC: 22 U/L (ref 15–37)
BACTERIA URNS QL MICRO: NEGATIVE /HPF
BASOPHILS # BLD: 0 K/UL (ref 0–0.1)
BASOPHILS NFR BLD: 0 % (ref 0–1)
BILIRUB SERPL-MCNC: 0.4 MG/DL (ref 0.2–1)
BILIRUB UR QL: NEGATIVE
BUN SERPL-MCNC: 11 MG/DL (ref 6–20)
BUN/CREAT SERPL: 9 (ref 12–20)
CALCIUM SERPL-MCNC: 9.4 MG/DL (ref 8.5–10.1)
CHLORIDE SERPL-SCNC: 95 MMOL/L (ref 97–108)
CO2 SERPL-SCNC: 24 MMOL/L (ref 21–32)
COLOR UR: ABNORMAL
CREAT SERPL-MCNC: 1.17 MG/DL (ref 0.55–1.02)
DIFFERENTIAL METHOD BLD: ABNORMAL
EOSINOPHIL # BLD: 0 K/UL (ref 0–0.4)
EOSINOPHIL NFR BLD: 0 % (ref 0–7)
EPITH CASTS URNS QL MICRO: ABNORMAL /LPF
ERYTHROCYTE [DISTWIDTH] IN BLOOD BY AUTOMATED COUNT: 11.9 % (ref 11.5–14.5)
ERYTHROCYTE [SEDIMENTATION RATE] IN BLOOD: 67 MM/HR (ref 0–20)
GLOBULIN SER CALC-MCNC: 5.5 G/DL (ref 2–4)
GLUCOSE SERPL-MCNC: 191 MG/DL (ref 65–100)
GLUCOSE UR STRIP.AUTO-MCNC: NEGATIVE MG/DL
HCG UR QL: NEGATIVE
HCT VFR BLD AUTO: 37.5 % (ref 35–47)
HGB BLD-MCNC: 12.7 G/DL (ref 11.5–16)
HGB UR QL STRIP: ABNORMAL
IMM GRANULOCYTES # BLD AUTO: 0 K/UL (ref 0–0.04)
IMM GRANULOCYTES NFR BLD AUTO: 0 % (ref 0–0.5)
KETONES UR QL STRIP.AUTO: NEGATIVE MG/DL
LACTATE SERPL-SCNC: 2.1 MMOL/L (ref 0.4–2)
LACTATE SERPL-SCNC: 3.3 MMOL/L (ref 0.4–2)
LEUKOCYTE ESTERASE UR QL STRIP.AUTO: NEGATIVE
LYMPHOCYTES # BLD: 0.8 K/UL (ref 0.8–3.5)
LYMPHOCYTES NFR BLD: 8 % (ref 12–49)
MCH RBC QN AUTO: 29.7 PG (ref 26–34)
MCHC RBC AUTO-ENTMCNC: 33.9 G/DL (ref 30–36.5)
MCV RBC AUTO: 87.8 FL (ref 80–99)
MONOCYTES # BLD: 0.4 K/UL (ref 0–1)
MONOCYTES NFR BLD: 4 % (ref 5–13)
NEUTS SEG # BLD: 8.3 K/UL (ref 1.8–8)
NEUTS SEG NFR BLD: 88 % (ref 32–75)
NITRITE UR QL STRIP.AUTO: NEGATIVE
NRBC # BLD: 0 K/UL (ref 0–0.01)
NRBC BLD-RTO: 0 PER 100 WBC
PH UR STRIP: 6.5 (ref 5–8)
PLATELET # BLD AUTO: 242 K/UL (ref 150–400)
PLATELET COMMENT: ABNORMAL
PMV BLD AUTO: 10.4 FL (ref 8.9–12.9)
POTASSIUM SERPL-SCNC: 4 MMOL/L (ref 3.5–5.1)
PROCALCITONIN SERPL-MCNC: 0.28 NG/ML
PROT SERPL-MCNC: 8.6 G/DL (ref 6.4–8.2)
PROT UR STRIP-MCNC: NEGATIVE MG/DL
RBC # BLD AUTO: 4.27 M/UL (ref 3.8–5.2)
RBC #/AREA URNS HPF: ABNORMAL /HPF (ref 0–5)
RBC MORPH BLD: ABNORMAL
SODIUM SERPL-SCNC: 131 MMOL/L (ref 136–145)
SP GR UR REFRACTOMETRY: 1 (ref 1–1.03)
TROPONIN I SERPL HS-MCNC: 5 NG/L (ref 0–51)
URINE CULTURE IF INDICATED: ABNORMAL
UROBILINOGEN UR QL STRIP.AUTO: 0.2 EU/DL (ref 0.2–1)
WBC # BLD AUTO: 9.5 K/UL (ref 3.6–11)
WBC URNS QL MICRO: ABNORMAL /HPF (ref 0–4)

## 2023-09-01 PROCEDURE — 2580000003 HC RX 258

## 2023-09-01 PROCEDURE — 96366 THER/PROPH/DIAG IV INF ADDON: CPT

## 2023-09-01 PROCEDURE — 86140 C-REACTIVE PROTEIN: CPT

## 2023-09-01 PROCEDURE — 96361 HYDRATE IV INFUSION ADD-ON: CPT

## 2023-09-01 PROCEDURE — 81025 URINE PREGNANCY TEST: CPT

## 2023-09-01 PROCEDURE — 87186 SC STD MICRODIL/AGAR DIL: CPT

## 2023-09-01 PROCEDURE — 84145 PROCALCITONIN (PCT): CPT

## 2023-09-01 PROCEDURE — 74177 CT ABD & PELVIS W/CONTRAST: CPT

## 2023-09-01 PROCEDURE — 87150 DNA/RNA AMPLIFIED PROBE: CPT

## 2023-09-01 PROCEDURE — 72148 MRI LUMBAR SPINE W/O DYE: CPT

## 2023-09-01 PROCEDURE — 2580000003 HC RX 258: Performed by: EMERGENCY MEDICINE

## 2023-09-01 PROCEDURE — 81001 URINALYSIS AUTO W/SCOPE: CPT

## 2023-09-01 PROCEDURE — 84484 ASSAY OF TROPONIN QUANT: CPT

## 2023-09-01 PROCEDURE — 6360000004 HC RX CONTRAST MEDICATION: Performed by: EMERGENCY MEDICINE

## 2023-09-01 PROCEDURE — 71045 X-RAY EXAM CHEST 1 VIEW: CPT

## 2023-09-01 PROCEDURE — 85025 COMPLETE CBC W/AUTO DIFF WBC: CPT

## 2023-09-01 PROCEDURE — 96376 TX/PRO/DX INJ SAME DRUG ADON: CPT

## 2023-09-01 PROCEDURE — 85652 RBC SED RATE AUTOMATED: CPT

## 2023-09-01 PROCEDURE — 96375 TX/PRO/DX INJ NEW DRUG ADDON: CPT

## 2023-09-01 PROCEDURE — 6360000002 HC RX W HCPCS: Performed by: EMERGENCY MEDICINE

## 2023-09-01 PROCEDURE — 83605 ASSAY OF LACTIC ACID: CPT

## 2023-09-01 PROCEDURE — 80053 COMPREHEN METABOLIC PANEL: CPT

## 2023-09-01 PROCEDURE — 87040 BLOOD CULTURE FOR BACTERIA: CPT

## 2023-09-01 PROCEDURE — 93005 ELECTROCARDIOGRAM TRACING: CPT | Performed by: EMERGENCY MEDICINE

## 2023-09-01 PROCEDURE — 36415 COLL VENOUS BLD VENIPUNCTURE: CPT

## 2023-09-01 PROCEDURE — 99285 EMERGENCY DEPT VISIT HI MDM: CPT

## 2023-09-01 PROCEDURE — 96365 THER/PROPH/DIAG IV INF INIT: CPT

## 2023-09-01 RX ORDER — FENTANYL CITRATE 50 UG/ML
50 INJECTION, SOLUTION INTRAMUSCULAR; INTRAVENOUS ONCE
Status: COMPLETED | OUTPATIENT
Start: 2023-09-01 | End: 2023-09-01

## 2023-09-01 RX ORDER — KETOROLAC TROMETHAMINE 15 MG/ML
15 INJECTION, SOLUTION INTRAMUSCULAR; INTRAVENOUS ONCE
Status: COMPLETED | OUTPATIENT
Start: 2023-09-01 | End: 2023-09-01

## 2023-09-01 RX ORDER — 0.9 % SODIUM CHLORIDE 0.9 %
30 INTRAVENOUS SOLUTION INTRAVENOUS ONCE
Status: COMPLETED | OUTPATIENT
Start: 2023-09-01 | End: 2023-09-01

## 2023-09-01 RX ORDER — LORAZEPAM 2 MG/ML
1 INJECTION INTRAMUSCULAR ONCE
Status: COMPLETED | OUTPATIENT
Start: 2023-09-01 | End: 2023-09-01

## 2023-09-01 RX ORDER — ONDANSETRON 2 MG/ML
4 INJECTION INTRAMUSCULAR; INTRAVENOUS
Status: COMPLETED | OUTPATIENT
Start: 2023-09-01 | End: 2023-09-01

## 2023-09-01 RX ORDER — LORAZEPAM 2 MG/ML
0.5 INJECTION INTRAMUSCULAR
Status: COMPLETED | OUTPATIENT
Start: 2023-09-01 | End: 2023-09-01

## 2023-09-01 RX ADMIN — LORAZEPAM 0.5 MG: 2 INJECTION INTRAMUSCULAR; INTRAVENOUS at 14:42

## 2023-09-01 RX ADMIN — PIPERACILLIN AND TAZOBACTAM 4500 MG: 4; .5 INJECTION, POWDER, LYOPHILIZED, FOR SOLUTION INTRAVENOUS at 12:30

## 2023-09-01 RX ADMIN — ONDANSETRON 4 MG: 2 INJECTION INTRAMUSCULAR; INTRAVENOUS at 12:36

## 2023-09-01 RX ADMIN — SODIUM CHLORIDE 2448 ML: 900 INJECTION INTRAVENOUS at 12:30

## 2023-09-01 RX ADMIN — LORAZEPAM 1 MG: 2 INJECTION INTRAMUSCULAR; INTRAVENOUS at 15:48

## 2023-09-01 RX ADMIN — IOPAMIDOL 100 ML: 612 INJECTION, SOLUTION INTRAVENOUS at 13:28

## 2023-09-01 RX ADMIN — KETOROLAC TROMETHAMINE 15 MG: 15 INJECTION, SOLUTION INTRAMUSCULAR; INTRAVENOUS at 12:36

## 2023-09-01 RX ADMIN — FENTANYL CITRATE 50 MCG: 50 INJECTION, SOLUTION INTRAMUSCULAR; INTRAVENOUS at 21:26

## 2023-09-01 ASSESSMENT — PAIN DESCRIPTION - DESCRIPTORS: DESCRIPTORS: ACHING

## 2023-09-01 ASSESSMENT — PAIN SCALES - GENERAL
PAINLEVEL_OUTOF10: 10
PAINLEVEL_OUTOF10: 9
PAINLEVEL_OUTOF10: 9
PAINLEVEL_OUTOF10: 10

## 2023-09-01 ASSESSMENT — PAIN - FUNCTIONAL ASSESSMENT: PAIN_FUNCTIONAL_ASSESSMENT: 0-10

## 2023-09-01 ASSESSMENT — PAIN DESCRIPTION - ORIENTATION: ORIENTATION: RIGHT;LEFT

## 2023-09-01 ASSESSMENT — PAIN DESCRIPTION - LOCATION: LOCATION: BACK

## 2023-09-01 NOTE — ED NOTES
Contacted [facility] regarding transfer of pt to [address] [place] [address] [id number], spoke with dispatcher, arranged for ALS transport with cardiac monitor, saline lock, no isolation, and with the patient on RA. Insurance information, ht/wt, and primary diagnosis provided. Received a 0200 ETA. Vesna Wilkins, ED charge RN made aware of ETA.

## 2023-09-01 NOTE — ED NOTES
Attempts for peripheral access x 3, right AC, L AC, right forearm, blood return but vein infiltration. Seeking additional assistance, Charge RN aware.      Rajiv Montes RN  12/30/98 7924

## 2023-09-01 NOTE — ED NOTES
Pt wheeled to restroom to urinate/provide specimen.  Pt  staying with pt in restroom to  Jose San RN  52/03/64 6511

## 2023-09-01 NOTE — ED NOTES
Additional staff at bedside continue to attempt for peripheral IV access x 3 unsuccessful. Labs drawn with attempts.      Carol Ann Bowman RN  86/67/34 5780

## 2023-09-01 NOTE — ED NOTES
Pt very tearful in MRI, difficulty laying flat.       Vlad Zhou, RN  20/36/44 1101 Mary Delgadillo RN  33/29/94 3576

## 2023-09-01 NOTE — ED NOTES
Unable to contact pt , voice mail left to simply return call to the ED.      Ximena Lopez RN  56/02/33 2458

## 2023-09-01 NOTE — ED NOTES
Pt still in restroom with , reports they are alright. Pt has also been having constipation.      Rosalio Rodriguez, RN  67/44/81 8141

## 2023-09-01 NOTE — ED NOTES
Pt would like to speak to ED Provider about her transfer. Pt  has not returned to the ED yet.      Carol Ann Bowman RN  39/11/87 1932

## 2023-09-01 NOTE — ED PROVIDER NOTES
deterioration of patient's condition, antibiotics are started STAT and cultures ordered. I've performed a sepsis reassessment of the patient's clinical volume status and tissue perfusion at time 3:07 PM EDT     ED Course as of 09/01/23 2005   Fri Sep 01, 2023   1226 Procedure Note- Peripheral IV Access  12:27 PM EDT   Performed by: MD Su Batista MD gained IV access using  20 gauge needle because the patient had no vascular access. After cleaning the site with alcohol prep, the left upper arm vein was localized with ultrasound guidance in an anterior approach. Line confirmation was obtained by direct visualization and good blood return. No anaesthetic was used. The line was successfully flushed with normal saline and was secured with transparent tape. Estimated blood loss: none  The procedure took 1-15 minutes, and pt tolerated tolerated the procedure well with no complications   [SANG]   8170 EKG performed at 12:14 PM shows sinus tachycardia, rate of 119, no acute ischemic changes per my interpretation [SANG]   1420 MRI T/L with and without contrast ordered as CT and without acute findings to explain severe back pain. UA without evidence of infection [SANG]   1644 Only MRI L spine without contrast able to be obtained due to patient's inability to sit still [SANG]   1700 MRI shows disciitis. Will transfer to [address] [place] [address],[address]   [phone] Discussed with Dr. Petrona Martinez, hospitalist at [facility], accepts patient in transfer [SANG]   1924 Patient assigned bed 2162  Number for report [phone] [SANG]      ED Course User Index  Beni Garcia MD         FINAL IMPRESSION     1. Discitis of lumbar region    2. Sepsis with acute organ dysfunction without septic shock, due to unspecified organism, unspecified type [facility])          DISPOSITION/PLAN     CLINICAL IMPRESSION    Transfer: The patient is being transferred to [facility] for disciitis.  The results of their tests and reasons for their transfer have been discussed with the patient and/or available family. The patient/family has conveyed agreement and understanding for the need to be admitted and for their admission diagnosis. Consultation has been made with Dr. Petrona Martinez, who agrees to accept the transfer. I am the Primary Clinician of Record. Su Pinto MD (electronically signed)    (Please note that parts of this dictation were completed with voice recognition software. Quite often unanticipated grammatical, syntax, homophones, and other interpretive errors are inadvertently transcribed by the computer software. Please disregards these errors.  Please excuse any errors that have escaped final proofreading.)         Ramona Saez MD  09/01/23 2010

## 2023-09-01 NOTE — ED NOTES
X 3 attempts at PIV insertion unsuccessful .  MD to bedside with [facility], RN  09/01/23 457 [facility] RN  09/01/23 6850

## 2023-09-01 NOTE — ED NOTES
Pt transported to MRI with Rad Tech by [employer] at [phone].       Dearl KENNEDY Wakefield  32/60/86 6881

## 2023-09-01 NOTE — ED NOTES
Pt changed into new brief, purewick replaced, returned onto monitoring and adjusted in bed for comfort. Pt tolerated well, arousable but snoring respirations between interactions.      Neto Junior RN  62/43/90 5913

## 2023-09-01 NOTE — ED NOTES
Pt still in MRI at this time. Pt  has left to work on her vehicle, has left note for contact and will return when needed.      Jr Cunningham RN  00/64/92 4684

## 2023-09-01 NOTE — ED NOTES
Attempt to call , goes to voice mail. Pt continues to rest, vitals wnl.      Dillno Mittal, RN  63/03/21 2827

## 2023-09-01 NOTE — ED NOTES
MRI has called, pt being returned to ED at this time due to inability to tolerate further imaging.      Dillon Mittal RN  94/77/69 2501

## 2023-09-02 ENCOUNTER — HOSPITAL ENCOUNTER (INPATIENT)
Facility: HOSPITAL | Age: 43
LOS: 11 days | Discharge: NURSING, REHAB OR LONG-TERM CARE | DRG: 347 | End: 2023-09-13
Attending: STUDENT IN AN ORGANIZED HEALTH CARE EDUCATION/TRAINING PROGRAM | Admitting: STUDENT IN AN ORGANIZED HEALTH CARE EDUCATION/TRAINING PROGRAM
Payer: MEDICAID

## 2023-09-02 ENCOUNTER — APPOINTMENT (OUTPATIENT)
Facility: HOSPITAL | Age: 43
DRG: 347 | End: 2023-09-02
Attending: STUDENT IN AN ORGANIZED HEALTH CARE EDUCATION/TRAINING PROGRAM
Payer: MEDICAID

## 2023-09-02 VITALS
TEMPERATURE: 99.2 F | WEIGHT: 180 LBS | HEART RATE: 68 BPM | DIASTOLIC BLOOD PRESSURE: 78 MMHG | HEIGHT: 67 IN | OXYGEN SATURATION: 98 % | RESPIRATION RATE: 22 BRPM | BODY MASS INDEX: 28.25 KG/M2 | SYSTOLIC BLOOD PRESSURE: 110 MMHG

## 2023-09-02 DIAGNOSIS — M46.40 DISCITIS, UNSPECIFIED SPINAL REGION: Primary | ICD-10-CM

## 2023-09-02 LAB
ALBUMIN SERPL-MCNC: 2.6 G/DL (ref 3.5–5)
ALBUMIN/GLOB SERPL: 0.5 (ref 1.1–2.2)
ALP SERPL-CCNC: 124 U/L (ref 45–117)
ALT SERPL-CCNC: 27 U/L (ref 12–78)
ANION GAP SERPL CALC-SCNC: 2 MMOL/L (ref 5–15)
AST SERPL-CCNC: 14 U/L (ref 15–37)
BASOPHILS # BLD: 0 K/UL (ref 0–0.1)
BASOPHILS NFR BLD: 1 % (ref 0–1)
BILIRUB SERPL-MCNC: 0.3 MG/DL (ref 0.2–1)
BUN SERPL-MCNC: 7 MG/DL (ref 6–20)
BUN/CREAT SERPL: 9 (ref 12–20)
CALCIUM SERPL-MCNC: 9.1 MG/DL (ref 8.5–10.1)
CHLORIDE SERPL-SCNC: 106 MMOL/L (ref 97–108)
CO2 SERPL-SCNC: 30 MMOL/L (ref 21–32)
CREAT SERPL-MCNC: 0.77 MG/DL (ref 0.55–1.02)
CRP SERPL-MCNC: 2.18 MG/DL (ref 0–0.6)
DIFFERENTIAL METHOD BLD: NORMAL
EKG ATRIAL RATE: 119 BPM
EKG DIAGNOSIS: NORMAL
EKG P AXIS: 60 DEGREES
EKG P-R INTERVAL: 166 MS
EKG Q-T INTERVAL: 302 MS
EKG QRS DURATION: 88 MS
EKG QTC CALCULATION (BAZETT): 424 MS
EKG R AXIS: 37 DEGREES
EKG T AXIS: 74 DEGREES
EKG VENTRICULAR RATE: 119 BPM
EOSINOPHIL # BLD: 0.2 K/UL (ref 0–0.4)
EOSINOPHIL NFR BLD: 4 % (ref 0–7)
ERYTHROCYTE [DISTWIDTH] IN BLOOD BY AUTOMATED COUNT: 12 % (ref 11.5–14.5)
GLOBULIN SER CALC-MCNC: 4.9 G/DL (ref 2–4)
GLUCOSE SERPL-MCNC: 90 MG/DL (ref 65–100)
HCT VFR BLD AUTO: 35.6 % (ref 35–47)
HGB BLD-MCNC: 11.8 G/DL (ref 11.5–16)
IMM GRANULOCYTES # BLD AUTO: 0 K/UL (ref 0–0.04)
IMM GRANULOCYTES NFR BLD AUTO: 0 % (ref 0–0.5)
LACTATE SERPL-SCNC: 0.8 MMOL/L (ref 0.4–2)
LYMPHOCYTES # BLD: 1.7 K/UL (ref 0.8–3.5)
LYMPHOCYTES NFR BLD: 41 % (ref 12–49)
MCH RBC QN AUTO: 29.6 PG (ref 26–34)
MCHC RBC AUTO-ENTMCNC: 33.1 G/DL (ref 30–36.5)
MCV RBC AUTO: 89.4 FL (ref 80–99)
MONOCYTES # BLD: 0.5 K/UL (ref 0–1)
MONOCYTES NFR BLD: 11 % (ref 5–13)
NEUTS SEG # BLD: 1.8 K/UL (ref 1.8–8)
NEUTS SEG NFR BLD: 43 % (ref 32–75)
NRBC # BLD: 0 K/UL (ref 0–0.01)
NRBC BLD-RTO: 0 PER 100 WBC
PLATELET # BLD AUTO: 195 K/UL (ref 150–400)
PMV BLD AUTO: 10.1 FL (ref 8.9–12.9)
POTASSIUM SERPL-SCNC: 4 MMOL/L (ref 3.5–5.1)
PROT SERPL-MCNC: 7.5 G/DL (ref 6.4–8.2)
RBC # BLD AUTO: 3.98 M/UL (ref 3.8–5.2)
SODIUM SERPL-SCNC: 138 MMOL/L (ref 136–145)
WBC # BLD AUTO: 4.2 K/UL (ref 3.6–11)

## 2023-09-02 PROCEDURE — 2580000003 HC RX 258: Performed by: STUDENT IN AN ORGANIZED HEALTH CARE EDUCATION/TRAINING PROGRAM

## 2023-09-02 PROCEDURE — 6360000002 HC RX W HCPCS: Performed by: INTERNAL MEDICINE

## 2023-09-02 PROCEDURE — 6360000002 HC RX W HCPCS: Performed by: EMERGENCY MEDICINE

## 2023-09-02 PROCEDURE — 2500000003 HC RX 250 WO HCPCS: Performed by: INTERNAL MEDICINE

## 2023-09-02 PROCEDURE — 6360000002 HC RX W HCPCS: Performed by: STUDENT IN AN ORGANIZED HEALTH CARE EDUCATION/TRAINING PROGRAM

## 2023-09-02 PROCEDURE — 36415 COLL VENOUS BLD VENIPUNCTURE: CPT

## 2023-09-02 PROCEDURE — 02HV33Z INSERTION OF INFUSION DEVICE INTO SUPERIOR VENA CAVA, PERCUTANEOUS APPROACH: ICD-10-PCS | Performed by: INTERNAL MEDICINE

## 2023-09-02 PROCEDURE — 2580000003 HC RX 258: Performed by: INTERNAL MEDICINE

## 2023-09-02 PROCEDURE — 2060000000 HC ICU INTERMEDIATE R&B

## 2023-09-02 PROCEDURE — 85025 COMPLETE CBC W/AUTO DIFF WBC: CPT

## 2023-09-02 PROCEDURE — 93308 TTE F-UP OR LMTD: CPT

## 2023-09-02 PROCEDURE — 83605 ASSAY OF LACTIC ACID: CPT

## 2023-09-02 PROCEDURE — 80053 COMPREHEN METABOLIC PANEL: CPT

## 2023-09-02 PROCEDURE — 6370000000 HC RX 637 (ALT 250 FOR IP): Performed by: STUDENT IN AN ORGANIZED HEALTH CARE EDUCATION/TRAINING PROGRAM

## 2023-09-02 RX ORDER — GABAPENTIN 600 MG/1
1200 TABLET ORAL 3 TIMES DAILY
Status: ON HOLD | COMMUNITY
End: 2023-09-13 | Stop reason: ALTCHOICE

## 2023-09-02 RX ORDER — SODIUM CHLORIDE 0.9 % (FLUSH) 0.9 %
5-40 SYRINGE (ML) INJECTION EVERY 12 HOURS SCHEDULED
Status: DISCONTINUED | OUTPATIENT
Start: 2023-09-02 | End: 2023-09-13 | Stop reason: HOSPADM

## 2023-09-02 RX ORDER — QUETIAPINE FUMARATE 50 MG/1
50 TABLET, EXTENDED RELEASE ORAL NIGHTLY
Status: DISCONTINUED | OUTPATIENT
Start: 2023-09-02 | End: 2023-09-13 | Stop reason: HOSPADM

## 2023-09-02 RX ORDER — ONDANSETRON 2 MG/ML
4 INJECTION INTRAMUSCULAR; INTRAVENOUS EVERY 6 HOURS PRN
Status: DISCONTINUED | OUTPATIENT
Start: 2023-09-02 | End: 2023-09-13 | Stop reason: HOSPADM

## 2023-09-02 RX ORDER — SODIUM CHLORIDE 9 MG/ML
INJECTION, SOLUTION INTRAVENOUS PRN
Status: DISCONTINUED | OUTPATIENT
Start: 2023-09-02 | End: 2023-09-13 | Stop reason: HOSPADM

## 2023-09-02 RX ORDER — FENTANYL CITRATE 50 UG/ML
50 INJECTION, SOLUTION INTRAMUSCULAR; INTRAVENOUS ONCE
Status: COMPLETED | OUTPATIENT
Start: 2023-09-02 | End: 2023-09-02

## 2023-09-02 RX ORDER — QUETIAPINE FUMARATE 50 MG/1
50 TABLET, EXTENDED RELEASE ORAL NIGHTLY
Status: ON HOLD | COMMUNITY
End: 2023-09-13 | Stop reason: ALTCHOICE

## 2023-09-02 RX ORDER — GABAPENTIN 300 MG/1
1200 CAPSULE ORAL 3 TIMES DAILY
Status: DISCONTINUED | OUTPATIENT
Start: 2023-09-02 | End: 2023-09-13 | Stop reason: HOSPADM

## 2023-09-02 RX ORDER — ONDANSETRON 4 MG/1
4 TABLET, ORALLY DISINTEGRATING ORAL EVERY 8 HOURS PRN
Status: DISCONTINUED | OUTPATIENT
Start: 2023-09-02 | End: 2023-09-13 | Stop reason: HOSPADM

## 2023-09-02 RX ORDER — ENOXAPARIN SODIUM 100 MG/ML
40 INJECTION SUBCUTANEOUS DAILY
Status: DISCONTINUED | OUTPATIENT
Start: 2023-09-02 | End: 2023-09-13 | Stop reason: HOSPADM

## 2023-09-02 RX ORDER — SODIUM CHLORIDE 0.9 % (FLUSH) 0.9 %
5-40 SYRINGE (ML) INJECTION PRN
Status: DISCONTINUED | OUTPATIENT
Start: 2023-09-02 | End: 2023-09-13 | Stop reason: HOSPADM

## 2023-09-02 RX ORDER — POLYETHYLENE GLYCOL 3350 17 G/17G
17 POWDER, FOR SOLUTION ORAL DAILY PRN
Status: DISCONTINUED | OUTPATIENT
Start: 2023-09-02 | End: 2023-09-04

## 2023-09-02 RX ORDER — OXYCODONE HYDROCHLORIDE 5 MG/1
5 TABLET ORAL EVERY 4 HOURS PRN
Status: DISCONTINUED | OUTPATIENT
Start: 2023-09-02 | End: 2023-09-04

## 2023-09-02 RX ORDER — HYDROMORPHONE HYDROCHLORIDE 1 MG/ML
0.5 INJECTION, SOLUTION INTRAMUSCULAR; INTRAVENOUS; SUBCUTANEOUS EVERY 4 HOURS PRN
Status: DISCONTINUED | OUTPATIENT
Start: 2023-09-02 | End: 2023-09-13 | Stop reason: HOSPADM

## 2023-09-02 RX ADMIN — OXYCODONE HYDROCHLORIDE 5 MG: 5 TABLET ORAL at 21:28

## 2023-09-02 RX ADMIN — OXYCODONE HYDROCHLORIDE 5 MG: 5 TABLET ORAL at 17:22

## 2023-09-02 RX ADMIN — PIPERACILLIN AND TAZOBACTAM 3375 MG: 3; .375 INJECTION, POWDER, LYOPHILIZED, FOR SOLUTION INTRAVENOUS at 13:00

## 2023-09-02 RX ADMIN — GABAPENTIN 1200 MG: 300 CAPSULE ORAL at 07:58

## 2023-09-02 RX ADMIN — ENOXAPARIN SODIUM 40 MG: 100 INJECTION SUBCUTANEOUS at 07:59

## 2023-09-02 RX ADMIN — PIPERACILLIN AND TAZOBACTAM 3375 MG: 3; .375 INJECTION, POWDER, LYOPHILIZED, FOR SOLUTION INTRAVENOUS at 09:12

## 2023-09-02 RX ADMIN — GABAPENTIN 1200 MG: 300 CAPSULE ORAL at 12:59

## 2023-09-02 RX ADMIN — GABAPENTIN 1200 MG: 300 CAPSULE ORAL at 20:38

## 2023-09-02 RX ADMIN — HYDROMORPHONE HYDROCHLORIDE 0.5 MG: 1 INJECTION, SOLUTION INTRAMUSCULAR; INTRAVENOUS; SUBCUTANEOUS at 18:49

## 2023-09-02 RX ADMIN — ONDANSETRON 4 MG: 4 TABLET, ORALLY DISINTEGRATING ORAL at 08:00

## 2023-09-02 RX ADMIN — PIPERACILLIN AND TAZOBACTAM 3375 MG: 3; .375 INJECTION, POWDER, LYOPHILIZED, FOR SOLUTION INTRAVENOUS at 21:58

## 2023-09-02 RX ADMIN — SODIUM CHLORIDE, PRESERVATIVE FREE 10 ML: 5 INJECTION INTRAVENOUS at 08:00

## 2023-09-02 RX ADMIN — OXYCODONE HYDROCHLORIDE 5 MG: 5 TABLET ORAL at 13:00

## 2023-09-02 RX ADMIN — OXYCODONE HYDROCHLORIDE 5 MG: 5 TABLET ORAL at 08:00

## 2023-09-02 RX ADMIN — HYDROMORPHONE HYDROCHLORIDE 0.5 MG: 1 INJECTION, SOLUTION INTRAMUSCULAR; INTRAVENOUS; SUBCUTANEOUS at 23:15

## 2023-09-02 RX ADMIN — VANCOMYCIN HYDROCHLORIDE 2000 MG: 10 INJECTION, POWDER, LYOPHILIZED, FOR SOLUTION INTRAVENOUS at 07:59

## 2023-09-02 RX ADMIN — FENTANYL CITRATE 50 MCG: 50 INJECTION, SOLUTION INTRAMUSCULAR; INTRAVENOUS at 04:25

## 2023-09-02 RX ADMIN — SODIUM CHLORIDE, PRESERVATIVE FREE 10 ML: 5 INJECTION INTRAVENOUS at 20:38

## 2023-09-02 RX ADMIN — VANCOMYCIN HYDROCHLORIDE 1000 MG: 1 INJECTION, POWDER, LYOPHILIZED, FOR SOLUTION INTRAVENOUS at 20:36

## 2023-09-02 ASSESSMENT — PAIN SCALES - GENERAL
PAINLEVEL_OUTOF10: 5
PAINLEVEL_OUTOF10: 8
PAINLEVEL_OUTOF10: 6
PAINLEVEL_OUTOF10: 8
PAINLEVEL_OUTOF10: 10
PAINLEVEL_OUTOF10: 6
PAINLEVEL_OUTOF10: 8
PAINLEVEL_OUTOF10: 8
PAINLEVEL_OUTOF10: 5
PAINLEVEL_OUTOF10: 0
PAINLEVEL_OUTOF10: 10
PAINLEVEL_OUTOF10: 9
PAINLEVEL_OUTOF10: 0
PAINLEVEL_OUTOF10: 7
PAINLEVEL_OUTOF10: 0
PAINLEVEL_OUTOF10: 0
PAINLEVEL_OUTOF10: 3
PAINLEVEL_OUTOF10: 8
PAINLEVEL_OUTOF10: 8

## 2023-09-02 ASSESSMENT — PAIN - FUNCTIONAL ASSESSMENT
PAIN_FUNCTIONAL_ASSESSMENT: ACTIVITIES ARE NOT PREVENTED
PAIN_FUNCTIONAL_ASSESSMENT: ACTIVITIES ARE NOT PREVENTED
PAIN_FUNCTIONAL_ASSESSMENT: PREVENTS OR INTERFERES SOME ACTIVE ACTIVITIES AND ADLS

## 2023-09-02 ASSESSMENT — PAIN SCALES - WONG BAKER
WONGBAKER_NUMERICALRESPONSE: 6
WONGBAKER_NUMERICALRESPONSE: 0
WONGBAKER_NUMERICALRESPONSE: 6
WONGBAKER_NUMERICALRESPONSE: 6
WONGBAKER_NUMERICALRESPONSE: 0
WONGBAKER_NUMERICALRESPONSE: 6
WONGBAKER_NUMERICALRESPONSE: 4
WONGBAKER_NUMERICALRESPONSE: 6

## 2023-09-02 ASSESSMENT — PAIN DESCRIPTION - LOCATION
LOCATION: BACK

## 2023-09-02 ASSESSMENT — PAIN DESCRIPTION - DESCRIPTORS
DESCRIPTORS: ACHING

## 2023-09-02 ASSESSMENT — PAIN DESCRIPTION - ONSET
ONSET: ON-GOING
ONSET: GRADUAL

## 2023-09-02 ASSESSMENT — PAIN DESCRIPTION - PAIN TYPE
TYPE: ACUTE PAIN
TYPE: ACUTE PAIN

## 2023-09-02 ASSESSMENT — PAIN DESCRIPTION - ORIENTATION
ORIENTATION: LOWER
ORIENTATION: MID;LOWER
ORIENTATION: MID;LOWER
ORIENTATION: LOWER

## 2023-09-02 NOTE — ED NOTES
Awakened to sign emtala form.  Requesting pain medication     [facility], [address] [place] [address]  09/02/23 6631

## 2023-09-02 NOTE — ED NOTES
TRANSFER - OUT REPORT:    Verbal report given to Rivera Rosas RN on Elnoria Both  being transferred to ED [facility] 2162 for routine progression of patient care       Report consisted of patient's Situation, Background, Assessment and   Recommendations(SBAR). Information from the following report(s) Nurse Handoff Report, ED SBAR, Intake/Output, MAR, Recent Results, Med Rec Status, Cardiac Rhythm NSR, and Quality Measures was reviewed with the receiving nurse. [place] Assessment:                           Lines:   Peripheral IV 09/01/23 Left Antecubital (Active)        Opportunity for questions and clarification was provided.       Patient transported with:  Yassine Mistry on continued monitoring          Ninoska Ward RN  16/99/16 8176

## 2023-09-02 NOTE — H&P
Moves all extremities. No overt focal deficits appreciated-BLE neurovascularly intact     Psych:  Anxious affect. Good insight. Cooperative     Other:    N/A             LAB DATA REVIEWED:    No results found for this or any previous visit (from the past 12 hour(s)). CT Result (most recent):  CT ABDOMEN PELVIS W IV CONTRAST 09/01/2023    Narrative  EXAM: CT ABDOMEN PELVIS W IV CONTRAST    INDICATION: bilateral flank pain, fever, r/o pyelo    COMPARISON: 5/22/2021    CONTRAST: 100 mL of Isovue-370. ORAL CONTRAST: None    TECHNIQUE:  Following the intravenous administration of intravenous contrast, axial images  were obtained through the abdomen and pelvis. Coronal and sagittal  reconstructions were generated. CT dose reduction was achieved through use of a  standardized protocol tailored for this examination and automatic exposure  control for dose modulation. FINDINGS:  No significant abnormalities in the lung bases. The liver enhances homogeneously. The spleen enhances appropriately. The  pancreas enhances homogeneously. No discrete masses. The gallbladder is  surgically absent. No significant biliary dilatation. The adrenal glands appear normal.    The kidneys enhance symmetrically. No hydronephrosis. There is no perinephric  stranding or fluid. There is a small hypodensity in the mid right kidney,  compatible with a cyst.    No stones in the bladder. No bladder wall thickening. The uterus is surgically absent. No free air, free fluid, or abscesses. No significant lymphadenopathy. No evidence of intestinal obstruction. The appendix appears normal.    Aorta is normal in caliber. No acute bony abnormalities. There are postoperative hardware attached to the  lumbosacral region. There are multilevel degenerative changes. Impression  No acute abnormalities.         Xray Result (most recent):  XR CHEST PORTABLE 09/01/2023    Narrative  EXAM: XR CHEST PORTABLE    INDICATION:

## 2023-09-02 NOTE — CONSULTS
ID  Patient seen and examined  Full consult to follow  Continue empiric antimicrobials  IR to aspirate  Records from [facility]

## 2023-09-03 LAB
ANION GAP SERPL CALC-SCNC: 7 MMOL/L (ref 5–15)
BACTERIA SPEC CULT: NORMAL
BACTERIA SPEC CULT: NORMAL
BASOPHILS # BLD: 0 K/UL (ref 0–0.1)
BASOPHILS NFR BLD: 0 % (ref 0–1)
BUN SERPL-MCNC: 8 MG/DL (ref 6–20)
BUN/CREAT SERPL: 8 (ref 12–20)
CALCIUM SERPL-MCNC: 9 MG/DL (ref 8.5–10.1)
CHLORIDE SERPL-SCNC: 103 MMOL/L (ref 97–108)
CO2 SERPL-SCNC: 27 MMOL/L (ref 21–32)
CREAT SERPL-MCNC: 1.05 MG/DL (ref 0.55–1.02)
DIFFERENTIAL METHOD BLD: ABNORMAL
EOSINOPHIL # BLD: 0.1 K/UL (ref 0–0.4)
EOSINOPHIL NFR BLD: 1 % (ref 0–7)
ERYTHROCYTE [DISTWIDTH] IN BLOOD BY AUTOMATED COUNT: 11.7 % (ref 11.5–14.5)
GLUCOSE SERPL-MCNC: 112 MG/DL (ref 65–100)
HCT VFR BLD AUTO: 39.5 % (ref 35–47)
HGB BLD-MCNC: 13 G/DL (ref 11.5–16)
IMM GRANULOCYTES # BLD AUTO: 0 K/UL (ref 0–0.04)
IMM GRANULOCYTES NFR BLD AUTO: 0 % (ref 0–0.5)
LACTATE SERPL-SCNC: 1.3 MMOL/L (ref 0.4–2)
LACTATE SERPL-SCNC: 3.4 MMOL/L (ref 0.4–2)
LACTATE SERPL-SCNC: 4.2 MMOL/L (ref 0.4–2)
LYMPHOCYTES # BLD: 0.4 K/UL (ref 0.8–3.5)
LYMPHOCYTES NFR BLD: 5 % (ref 12–49)
MAGNESIUM SERPL-MCNC: 1.5 MG/DL (ref 1.6–2.4)
MCH RBC QN AUTO: 29.1 PG (ref 26–34)
MCHC RBC AUTO-ENTMCNC: 32.9 G/DL (ref 30–36.5)
MCV RBC AUTO: 88.4 FL (ref 80–99)
MONOCYTES # BLD: 0.1 K/UL (ref 0–1)
MONOCYTES NFR BLD: 1 % (ref 5–13)
NEUTS SEG # BLD: 6.8 K/UL (ref 1.8–8)
NEUTS SEG NFR BLD: 93 % (ref 32–75)
NRBC # BLD: 0 K/UL (ref 0–0.01)
NRBC BLD-RTO: 0 PER 100 WBC
PLATELET # BLD AUTO: 195 K/UL (ref 150–400)
PMV BLD AUTO: 10 FL (ref 8.9–12.9)
POTASSIUM SERPL-SCNC: 3.4 MMOL/L (ref 3.5–5.1)
RBC # BLD AUTO: 4.47 M/UL (ref 3.8–5.2)
RBC MORPH BLD: ABNORMAL
SERVICE CMNT-IMP: NORMAL
SERVICE CMNT-IMP: NORMAL
SODIUM SERPL-SCNC: 137 MMOL/L (ref 136–145)
WBC # BLD AUTO: 7.4 K/UL (ref 3.6–11)

## 2023-09-03 PROCEDURE — 6370000000 HC RX 637 (ALT 250 FOR IP): Performed by: NURSE PRACTITIONER

## 2023-09-03 PROCEDURE — 6360000002 HC RX W HCPCS: Performed by: STUDENT IN AN ORGANIZED HEALTH CARE EDUCATION/TRAINING PROGRAM

## 2023-09-03 PROCEDURE — 80048 BASIC METABOLIC PNL TOTAL CA: CPT

## 2023-09-03 PROCEDURE — 2580000003 HC RX 258: Performed by: NURSE PRACTITIONER

## 2023-09-03 PROCEDURE — 6360000002 HC RX W HCPCS: Performed by: INTERNAL MEDICINE

## 2023-09-03 PROCEDURE — 6360000002 HC RX W HCPCS: Performed by: NURSE PRACTITIONER

## 2023-09-03 PROCEDURE — 2580000003 HC RX 258: Performed by: INTERNAL MEDICINE

## 2023-09-03 PROCEDURE — 2580000003 HC RX 258: Performed by: STUDENT IN AN ORGANIZED HEALTH CARE EDUCATION/TRAINING PROGRAM

## 2023-09-03 PROCEDURE — 2500000003 HC RX 250 WO HCPCS: Performed by: INTERNAL MEDICINE

## 2023-09-03 PROCEDURE — 2060000000 HC ICU INTERMEDIATE R&B

## 2023-09-03 PROCEDURE — 85025 COMPLETE CBC W/AUTO DIFF WBC: CPT

## 2023-09-03 PROCEDURE — 83605 ASSAY OF LACTIC ACID: CPT

## 2023-09-03 PROCEDURE — 83735 ASSAY OF MAGNESIUM: CPT

## 2023-09-03 PROCEDURE — 87040 BLOOD CULTURE FOR BACTERIA: CPT

## 2023-09-03 PROCEDURE — 36415 COLL VENOUS BLD VENIPUNCTURE: CPT

## 2023-09-03 PROCEDURE — 6370000000 HC RX 637 (ALT 250 FOR IP): Performed by: INTERNAL MEDICINE

## 2023-09-03 PROCEDURE — 6370000000 HC RX 637 (ALT 250 FOR IP): Performed by: STUDENT IN AN ORGANIZED HEALTH CARE EDUCATION/TRAINING PROGRAM

## 2023-09-03 RX ORDER — IBUPROFEN 400 MG/1
200 TABLET ORAL EVERY 6 HOURS PRN
Status: DISCONTINUED | OUTPATIENT
Start: 2023-09-03 | End: 2023-09-13 | Stop reason: HOSPADM

## 2023-09-03 RX ORDER — FAMOTIDINE 20 MG/1
20 TABLET, FILM COATED ORAL 2 TIMES DAILY PRN
Status: DISCONTINUED | OUTPATIENT
Start: 2023-09-03 | End: 2023-09-13 | Stop reason: HOSPADM

## 2023-09-03 RX ORDER — KETOROLAC TROMETHAMINE 30 MG/ML
15 INJECTION, SOLUTION INTRAMUSCULAR; INTRAVENOUS
Status: COMPLETED | OUTPATIENT
Start: 2023-09-03 | End: 2023-09-03

## 2023-09-03 RX ORDER — MAGNESIUM SULFATE IN WATER 40 MG/ML
2000 INJECTION, SOLUTION INTRAVENOUS ONCE
Status: COMPLETED | OUTPATIENT
Start: 2023-09-03 | End: 2023-09-03

## 2023-09-03 RX ORDER — SODIUM CHLORIDE 9 MG/ML
INJECTION, SOLUTION INTRAVENOUS CONTINUOUS
Status: DISCONTINUED | OUTPATIENT
Start: 2023-09-03 | End: 2023-09-07

## 2023-09-03 RX ADMIN — PIPERACILLIN AND TAZOBACTAM 3375 MG: 3; .375 INJECTION, POWDER, LYOPHILIZED, FOR SOLUTION INTRAVENOUS at 04:53

## 2023-09-03 RX ADMIN — FAMOTIDINE 20 MG: 20 TABLET ORAL at 20:09

## 2023-09-03 RX ADMIN — VANCOMYCIN HYDROCHLORIDE 1000 MG: 1 INJECTION, POWDER, LYOPHILIZED, FOR SOLUTION INTRAVENOUS at 20:10

## 2023-09-03 RX ADMIN — QUETIAPINE FUMARATE 50 MG: 50 TABLET, EXTENDED RELEASE ORAL at 21:59

## 2023-09-03 RX ADMIN — VANCOMYCIN HYDROCHLORIDE 1000 MG: 1 INJECTION, POWDER, LYOPHILIZED, FOR SOLUTION INTRAVENOUS at 08:03

## 2023-09-03 RX ADMIN — GABAPENTIN 1200 MG: 300 CAPSULE ORAL at 13:31

## 2023-09-03 RX ADMIN — HYDROMORPHONE HYDROCHLORIDE 0.5 MG: 1 INJECTION, SOLUTION INTRAMUSCULAR; INTRAVENOUS; SUBCUTANEOUS at 13:31

## 2023-09-03 RX ADMIN — GABAPENTIN 1200 MG: 300 CAPSULE ORAL at 21:59

## 2023-09-03 RX ADMIN — SODIUM CHLORIDE: 9 INJECTION, SOLUTION INTRAVENOUS at 04:09

## 2023-09-03 RX ADMIN — PIPERACILLIN AND TAZOBACTAM 3375 MG: 3; .375 INJECTION, POWDER, LYOPHILIZED, FOR SOLUTION INTRAVENOUS at 13:35

## 2023-09-03 RX ADMIN — GABAPENTIN 1200 MG: 300 CAPSULE ORAL at 08:03

## 2023-09-03 RX ADMIN — HYDROMORPHONE HYDROCHLORIDE 0.5 MG: 1 INJECTION, SOLUTION INTRAMUSCULAR; INTRAVENOUS; SUBCUTANEOUS at 22:25

## 2023-09-03 RX ADMIN — OXYCODONE HYDROCHLORIDE 5 MG: 5 TABLET ORAL at 10:23

## 2023-09-03 RX ADMIN — MAGNESIUM SULFATE HEPTAHYDRATE 2000 MG: 40 INJECTION, SOLUTION INTRAVENOUS at 10:24

## 2023-09-03 RX ADMIN — POTASSIUM BICARBONATE 40 MEQ: 782 TABLET, EFFERVESCENT ORAL at 07:54

## 2023-09-03 RX ADMIN — PIPERACILLIN AND TAZOBACTAM 3375 MG: 3; .375 INJECTION, POWDER, LYOPHILIZED, FOR SOLUTION INTRAVENOUS at 22:03

## 2023-09-03 RX ADMIN — SODIUM CHLORIDE, PRESERVATIVE FREE 10 ML: 5 INJECTION INTRAVENOUS at 08:03

## 2023-09-03 RX ADMIN — HYDROMORPHONE HYDROCHLORIDE 0.5 MG: 1 INJECTION, SOLUTION INTRAMUSCULAR; INTRAVENOUS; SUBCUTANEOUS at 09:22

## 2023-09-03 RX ADMIN — SODIUM CHLORIDE, PRESERVATIVE FREE 10 ML: 5 INJECTION INTRAVENOUS at 22:04

## 2023-09-03 RX ADMIN — KETOROLAC TROMETHAMINE 15 MG: 30 INJECTION, SOLUTION INTRAMUSCULAR; INTRAVENOUS at 03:29

## 2023-09-03 RX ADMIN — HYDROMORPHONE HYDROCHLORIDE 0.5 MG: 1 INJECTION, SOLUTION INTRAMUSCULAR; INTRAVENOUS; SUBCUTANEOUS at 03:25

## 2023-09-03 ASSESSMENT — PAIN DESCRIPTION - ORIENTATION
ORIENTATION: ANTERIOR
ORIENTATION: LOWER
ORIENTATION: ANTERIOR

## 2023-09-03 ASSESSMENT — PAIN SCALES - GENERAL
PAINLEVEL_OUTOF10: 7
PAINLEVEL_OUTOF10: 7
PAINLEVEL_OUTOF10: 8
PAINLEVEL_OUTOF10: 0
PAINLEVEL_OUTOF10: 8
PAINLEVEL_OUTOF10: 7
PAINLEVEL_OUTOF10: 8
PAINLEVEL_OUTOF10: 0
PAINLEVEL_OUTOF10: 9
PAINLEVEL_OUTOF10: 10
PAINLEVEL_OUTOF10: 7
PAINLEVEL_OUTOF10: 6
PAINLEVEL_OUTOF10: 8
PAINLEVEL_OUTOF10: 9
PAINLEVEL_OUTOF10: 7
PAINLEVEL_OUTOF10: 8
PAINLEVEL_OUTOF10: 5
PAINLEVEL_OUTOF10: 10
PAINLEVEL_OUTOF10: 0

## 2023-09-03 ASSESSMENT — PAIN DESCRIPTION - ONSET
ONSET: SUDDEN
ONSET: GRADUAL
ONSET: ON-GOING

## 2023-09-03 ASSESSMENT — PAIN DESCRIPTION - LOCATION
LOCATION: BACK
LOCATION: HEAD
LOCATION: HEAD
LOCATION: BACK

## 2023-09-03 ASSESSMENT — PAIN DESCRIPTION - DESCRIPTORS
DESCRIPTORS: ACHING

## 2023-09-03 ASSESSMENT — PAIN SCALES - WONG BAKER
WONGBAKER_NUMERICALRESPONSE: 8
WONGBAKER_NUMERICALRESPONSE: 8
WONGBAKER_NUMERICALRESPONSE: 4
WONGBAKER_NUMERICALRESPONSE: 0
WONGBAKER_NUMERICALRESPONSE: 6
WONGBAKER_NUMERICALRESPONSE: 0
WONGBAKER_NUMERICALRESPONSE: 0

## 2023-09-03 ASSESSMENT — PAIN DESCRIPTION - PAIN TYPE
TYPE: CHRONIC PAIN
TYPE: ACUTE PAIN
TYPE: CHRONIC PAIN

## 2023-09-03 ASSESSMENT — PAIN - FUNCTIONAL ASSESSMENT
PAIN_FUNCTIONAL_ASSESSMENT: ACTIVITIES ARE NOT PREVENTED
PAIN_FUNCTIONAL_ASSESSMENT: ACTIVITIES ARE NOT PREVENTED

## 2023-09-04 LAB
ALBUMIN SERPL-MCNC: 2.3 G/DL (ref 3.5–5)
ALBUMIN/GLOB SERPL: 0.5 (ref 1.1–2.2)
ALP SERPL-CCNC: 133 U/L (ref 45–117)
ALT SERPL-CCNC: 43 U/L (ref 12–78)
ANION GAP SERPL CALC-SCNC: 5 MMOL/L (ref 5–15)
AST SERPL-CCNC: 34 U/L (ref 15–37)
BASOPHILS # BLD: 0 K/UL (ref 0–0.1)
BASOPHILS NFR BLD: 1 % (ref 0–1)
BILIRUB SERPL-MCNC: 0.3 MG/DL (ref 0.2–1)
BUN SERPL-MCNC: 5 MG/DL (ref 6–20)
BUN/CREAT SERPL: 5 (ref 12–20)
CALCIUM SERPL-MCNC: 8.7 MG/DL (ref 8.5–10.1)
CHLORIDE SERPL-SCNC: 109 MMOL/L (ref 97–108)
CO2 SERPL-SCNC: 27 MMOL/L (ref 21–32)
CREAT SERPL-MCNC: 0.92 MG/DL (ref 0.55–1.02)
DIFFERENTIAL METHOD BLD: ABNORMAL
ECHO AO ROOT DIAM: 3.3 CM
ECHO AO ROOT INDEX: 1.69 CM/M2
ECHO AV AREA PEAK VELOCITY: 2.8 CM2
ECHO AV AREA/BSA PEAK VELOCITY: 1.4 CM2/M2
ECHO AV PEAK GRADIENT: 4 MMHG
ECHO AV PEAK VELOCITY: 1 M/S
ECHO AV VELOCITY RATIO: 0.8
ECHO BSA: 1.98 M2
ECHO LA VOL 4C: 34 ML (ref 22–52)
ECHO LA VOL 4C: 37 ML (ref 22–52)
ECHO LV EDV A4C: 74 ML
ECHO LV EDV INDEX A4C: 38 ML/M2
ECHO LV EJECTION FRACTION A4C: 56 %
ECHO LV ESV A4C: 33 ML
ECHO LV ESV INDEX A4C: 17 ML/M2
ECHO LVOT AREA: 3.5 CM2
ECHO LVOT DIAM: 2.1 CM
ECHO LVOT PEAK GRADIENT: 3 MMHG
ECHO LVOT PEAK VELOCITY: 0.8 M/S
ECHO PV MAX VELOCITY: 0.8 M/S
ECHO PV PEAK GRADIENT: 3 MMHG
ECHO RV INTERNAL DIMENSION: 3.2 CM
ECHO RV TAPSE: 1.8 CM (ref 1.7–?)
EOSINOPHIL # BLD: 0.3 K/UL (ref 0–0.4)
EOSINOPHIL NFR BLD: 5 % (ref 0–7)
ERYTHROCYTE [DISTWIDTH] IN BLOOD BY AUTOMATED COUNT: 12.2 % (ref 11.5–14.5)
GLOBULIN SER CALC-MCNC: 4.6 G/DL (ref 2–4)
GLUCOSE SERPL-MCNC: 107 MG/DL (ref 65–100)
HCT VFR BLD AUTO: 33.7 % (ref 35–47)
HGB BLD-MCNC: 11.1 G/DL (ref 11.5–16)
IMM GRANULOCYTES # BLD AUTO: 0 K/UL (ref 0–0.04)
IMM GRANULOCYTES NFR BLD AUTO: 0 % (ref 0–0.5)
LYMPHOCYTES # BLD: 1.5 K/UL (ref 0.8–3.5)
LYMPHOCYTES NFR BLD: 29 % (ref 12–49)
MAGNESIUM SERPL-MCNC: 2.1 MG/DL (ref 1.6–2.4)
MCH RBC QN AUTO: 29.3 PG (ref 26–34)
MCHC RBC AUTO-ENTMCNC: 32.9 G/DL (ref 30–36.5)
MCV RBC AUTO: 88.9 FL (ref 80–99)
MONOCYTES # BLD: 0.4 K/UL (ref 0–1)
MONOCYTES NFR BLD: 8 % (ref 5–13)
NEUTS SEG # BLD: 3 K/UL (ref 1.8–8)
NEUTS SEG NFR BLD: 57 % (ref 32–75)
NRBC # BLD: 0 K/UL (ref 0–0.01)
NRBC BLD-RTO: 0 PER 100 WBC
PLATELET # BLD AUTO: 173 K/UL (ref 150–400)
PMV BLD AUTO: 10.5 FL (ref 8.9–12.9)
POTASSIUM SERPL-SCNC: 3.8 MMOL/L (ref 3.5–5.1)
PROT SERPL-MCNC: 6.9 G/DL (ref 6.4–8.2)
RBC # BLD AUTO: 3.79 M/UL (ref 3.8–5.2)
SODIUM SERPL-SCNC: 141 MMOL/L (ref 136–145)
VANCOMYCIN SERPL-MCNC: 12.6 UG/ML
WBC # BLD AUTO: 5.2 K/UL (ref 3.6–11)

## 2023-09-04 PROCEDURE — 80202 ASSAY OF VANCOMYCIN: CPT

## 2023-09-04 PROCEDURE — 2500000003 HC RX 250 WO HCPCS: Performed by: INTERNAL MEDICINE

## 2023-09-04 PROCEDURE — 6360000002 HC RX W HCPCS: Performed by: STUDENT IN AN ORGANIZED HEALTH CARE EDUCATION/TRAINING PROGRAM

## 2023-09-04 PROCEDURE — 6360000002 HC RX W HCPCS: Performed by: NURSE PRACTITIONER

## 2023-09-04 PROCEDURE — 36415 COLL VENOUS BLD VENIPUNCTURE: CPT

## 2023-09-04 PROCEDURE — 2580000003 HC RX 258: Performed by: STUDENT IN AN ORGANIZED HEALTH CARE EDUCATION/TRAINING PROGRAM

## 2023-09-04 PROCEDURE — C9113 INJ PANTOPRAZOLE SODIUM, VIA: HCPCS | Performed by: NURSE PRACTITIONER

## 2023-09-04 PROCEDURE — 2580000003 HC RX 258: Performed by: INTERNAL MEDICINE

## 2023-09-04 PROCEDURE — 6370000000 HC RX 637 (ALT 250 FOR IP): Performed by: NURSE PRACTITIONER

## 2023-09-04 PROCEDURE — 2060000000 HC ICU INTERMEDIATE R&B

## 2023-09-04 PROCEDURE — 2580000003 HC RX 258: Performed by: NURSE PRACTITIONER

## 2023-09-04 PROCEDURE — 6370000000 HC RX 637 (ALT 250 FOR IP): Performed by: INTERNAL MEDICINE

## 2023-09-04 PROCEDURE — 85025 COMPLETE CBC W/AUTO DIFF WBC: CPT

## 2023-09-04 PROCEDURE — 80053 COMPREHEN METABOLIC PANEL: CPT

## 2023-09-04 PROCEDURE — 6360000002 HC RX W HCPCS: Performed by: INTERNAL MEDICINE

## 2023-09-04 PROCEDURE — 83735 ASSAY OF MAGNESIUM: CPT

## 2023-09-04 PROCEDURE — 6370000000 HC RX 637 (ALT 250 FOR IP): Performed by: STUDENT IN AN ORGANIZED HEALTH CARE EDUCATION/TRAINING PROGRAM

## 2023-09-04 RX ORDER — SENNA AND DOCUSATE SODIUM 50; 8.6 MG/1; MG/1
2 TABLET, FILM COATED ORAL DAILY
Status: DISCONTINUED | OUTPATIENT
Start: 2023-09-04 | End: 2023-09-13 | Stop reason: HOSPADM

## 2023-09-04 RX ORDER — LIDOCAINE 4 G/G
1 PATCH TOPICAL DAILY
Status: DISCONTINUED | OUTPATIENT
Start: 2023-09-04 | End: 2023-09-13 | Stop reason: HOSPADM

## 2023-09-04 RX ORDER — MAGNESIUM HYDROXIDE/ALUMINUM HYDROXICE/SIMETHICONE 120; 1200; 1200 MG/30ML; MG/30ML; MG/30ML
30 SUSPENSION ORAL EVERY 6 HOURS PRN
Status: DISCONTINUED | OUTPATIENT
Start: 2023-09-04 | End: 2023-09-13 | Stop reason: HOSPADM

## 2023-09-04 RX ORDER — POLYETHYLENE GLYCOL 3350 17 G/17G
17 POWDER, FOR SOLUTION ORAL DAILY
Status: DISCONTINUED | OUTPATIENT
Start: 2023-09-04 | End: 2023-09-13 | Stop reason: HOSPADM

## 2023-09-04 RX ORDER — OXYCODONE HYDROCHLORIDE 5 MG/1
10 TABLET ORAL EVERY 4 HOURS PRN
Status: DISCONTINUED | OUTPATIENT
Start: 2023-09-04 | End: 2023-09-13 | Stop reason: HOSPADM

## 2023-09-04 RX ADMIN — SODIUM CHLORIDE, PRESERVATIVE FREE 40 MG: 5 INJECTION INTRAVENOUS at 16:11

## 2023-09-04 RX ADMIN — SODIUM CHLORIDE, PRESERVATIVE FREE 10 ML: 5 INJECTION INTRAVENOUS at 20:39

## 2023-09-04 RX ADMIN — SODIUM CHLORIDE: 9 INJECTION, SOLUTION INTRAVENOUS at 20:33

## 2023-09-04 RX ADMIN — GABAPENTIN 1200 MG: 300 CAPSULE ORAL at 20:36

## 2023-09-04 RX ADMIN — HYDROMORPHONE HYDROCHLORIDE 0.5 MG: 1 INJECTION, SOLUTION INTRAMUSCULAR; INTRAVENOUS; SUBCUTANEOUS at 20:31

## 2023-09-04 RX ADMIN — Medication 1250 MG: at 20:30

## 2023-09-04 RX ADMIN — PIPERACILLIN AND TAZOBACTAM 3375 MG: 3; .375 INJECTION, POWDER, LYOPHILIZED, FOR SOLUTION INTRAVENOUS at 20:32

## 2023-09-04 RX ADMIN — PIPERACILLIN AND TAZOBACTAM 3375 MG: 3; .375 INJECTION, POWDER, LYOPHILIZED, FOR SOLUTION INTRAVENOUS at 13:24

## 2023-09-04 RX ADMIN — VANCOMYCIN HYDROCHLORIDE 1000 MG: 1 INJECTION, POWDER, LYOPHILIZED, FOR SOLUTION INTRAVENOUS at 09:28

## 2023-09-04 RX ADMIN — HYDROMORPHONE HYDROCHLORIDE 0.5 MG: 1 INJECTION, SOLUTION INTRAMUSCULAR; INTRAVENOUS; SUBCUTANEOUS at 16:10

## 2023-09-04 RX ADMIN — IBUPROFEN 200 MG: 400 TABLET, FILM COATED ORAL at 23:09

## 2023-09-04 RX ADMIN — GABAPENTIN 1200 MG: 300 CAPSULE ORAL at 13:24

## 2023-09-04 RX ADMIN — SODIUM CHLORIDE, PRESERVATIVE FREE 10 ML: 5 INJECTION INTRAVENOUS at 09:31

## 2023-09-04 RX ADMIN — GABAPENTIN 1200 MG: 300 CAPSULE ORAL at 09:28

## 2023-09-04 RX ADMIN — HYDROMORPHONE HYDROCHLORIDE 0.5 MG: 1 INJECTION, SOLUTION INTRAMUSCULAR; INTRAVENOUS; SUBCUTANEOUS at 11:16

## 2023-09-04 RX ADMIN — OXYCODONE HYDROCHLORIDE 10 MG: 5 TABLET ORAL at 22:11

## 2023-09-04 RX ADMIN — SODIUM CHLORIDE: 9 INJECTION, SOLUTION INTRAVENOUS at 07:47

## 2023-09-04 RX ADMIN — PIPERACILLIN AND TAZOBACTAM 3375 MG: 3; .375 INJECTION, POWDER, LYOPHILIZED, FOR SOLUTION INTRAVENOUS at 06:03

## 2023-09-04 RX ADMIN — HYDROMORPHONE HYDROCHLORIDE 0.5 MG: 1 INJECTION, SOLUTION INTRAMUSCULAR; INTRAVENOUS; SUBCUTANEOUS at 06:09

## 2023-09-04 RX ADMIN — FAMOTIDINE 20 MG: 20 TABLET ORAL at 09:28

## 2023-09-04 ASSESSMENT — PAIN DESCRIPTION - ORIENTATION
ORIENTATION: LOWER
ORIENTATION: POSTERIOR

## 2023-09-04 ASSESSMENT — PAIN SCALES - GENERAL
PAINLEVEL_OUTOF10: 9
PAINLEVEL_OUTOF10: 8
PAINLEVEL_OUTOF10: 9
PAINLEVEL_OUTOF10: 9
PAINLEVEL_OUTOF10: 8
PAINLEVEL_OUTOF10: 9
PAINLEVEL_OUTOF10: 10
PAINLEVEL_OUTOF10: 8
PAINLEVEL_OUTOF10: 9

## 2023-09-04 ASSESSMENT — PAIN - FUNCTIONAL ASSESSMENT
PAIN_FUNCTIONAL_ASSESSMENT: ACTIVITIES ARE NOT PREVENTED

## 2023-09-04 ASSESSMENT — PAIN DESCRIPTION - LOCATION
LOCATION: BACK

## 2023-09-04 ASSESSMENT — PAIN DESCRIPTION - ONSET
ONSET: ON-GOING

## 2023-09-04 ASSESSMENT — PAIN DESCRIPTION - DESCRIPTORS
DESCRIPTORS: ACHING

## 2023-09-04 ASSESSMENT — PAIN DESCRIPTION - FREQUENCY
FREQUENCY: CONTINUOUS

## 2023-09-04 ASSESSMENT — PAIN DESCRIPTION - PAIN TYPE
TYPE: CHRONIC PAIN

## 2023-09-05 PROBLEM — A41.9 SEPSIS (HCC): Status: ACTIVE | Noted: 2023-09-05

## 2023-09-05 LAB
ACCESSION NUMBER, LLC1M: NORMAL
ACINETOBACTER CALCOAC BAUMANNII COMPLEX BY PCR: NOT DETECTED
ANION GAP SERPL CALC-SCNC: 2 MMOL/L (ref 5–15)
B FRAGILIS DNA BLD POS QL NAA+NON-PROBE: NOT DETECTED
BASOPHILS # BLD: 0 K/UL (ref 0–0.1)
BASOPHILS NFR BLD: 1 % (ref 0–1)
BIOFIRE TEST COMMENT: NORMAL
BUN SERPL-MCNC: 5 MG/DL (ref 6–20)
BUN/CREAT SERPL: 5 (ref 12–20)
C ALBICANS DNA BLD POS QL NAA+NON-PROBE: NOT DETECTED
C AURIS DNA BLD POS QL NAA+NON-PROBE: NOT DETECTED
C GATTII+NEOFOR DNA BLD POS QL NAA+N-PRB: NOT DETECTED
C GLABRATA DNA BLD POS QL NAA+NON-PROBE: NOT DETECTED
C KRUSEI DNA BLD POS QL NAA+NON-PROBE: NOT DETECTED
C PARAP DNA BLD POS QL NAA+NON-PROBE: NOT DETECTED
C TROPICLS DNA BLD POS QL NAA+NON-PROBE: NOT DETECTED
CALCIUM SERPL-MCNC: 8.6 MG/DL (ref 8.5–10.1)
CHLORIDE SERPL-SCNC: 108 MMOL/L (ref 97–108)
CO2 SERPL-SCNC: 28 MMOL/L (ref 21–32)
CREAT SERPL-MCNC: 0.99 MG/DL (ref 0.55–1.02)
DIFFERENTIAL METHOD BLD: ABNORMAL
E CLOAC COMP DNA BLD POS NAA+NON-PROBE: NOT DETECTED
E COLI DNA BLD POS QL NAA+NON-PROBE: NOT DETECTED
E FAECALIS DNA BLD POS QL NAA+NON-PROBE: NOT DETECTED
E FAECIUM DNA BLD POS QL NAA+NON-PROBE: NOT DETECTED
ENTEROBACTERALES DNA BLD POS NAA+N-PRB: NOT DETECTED
EOSINOPHIL # BLD: 0.1 K/UL (ref 0–0.4)
EOSINOPHIL NFR BLD: 3 % (ref 0–7)
ERYTHROCYTE [DISTWIDTH] IN BLOOD BY AUTOMATED COUNT: 11.7 % (ref 11.5–14.5)
GLUCOSE SERPL-MCNC: 98 MG/DL (ref 65–100)
GP B STREP DNA BLD POS QL NAA+NON-PROBE: NOT DETECTED
HAEM INFLU DNA BLD POS QL NAA+NON-PROBE: NOT DETECTED
HBV SURFACE AG SER QL: <0.1 INDEX
HBV SURFACE AG SER QL: NEGATIVE
HCT VFR BLD AUTO: 33.6 % (ref 35–47)
HGB BLD-MCNC: 11.4 G/DL (ref 11.5–16)
HIV 1+2 AB+HIV1 P24 AG SERPL QL IA: NONREACTIVE
HIV 1/2 RESULT COMMENT: NORMAL
IMM GRANULOCYTES # BLD AUTO: 0 K/UL (ref 0–0.04)
IMM GRANULOCYTES NFR BLD AUTO: 0 % (ref 0–0.5)
K OXYTOCA DNA BLD POS QL NAA+NON-PROBE: NOT DETECTED
KLEBSIELLA SP DNA BLD POS QL NAA+NON-PRB: NOT DETECTED
KLEBSIELLA SP DNA BLD POS QL NAA+NON-PRB: NOT DETECTED
L MONOCYTOG DNA BLD POS QL NAA+NON-PROBE: NOT DETECTED
LYMPHOCYTES # BLD: 1 K/UL (ref 0.8–3.5)
LYMPHOCYTES NFR BLD: 25 % (ref 12–49)
MAGNESIUM SERPL-MCNC: 2 MG/DL (ref 1.6–2.4)
MCH RBC QN AUTO: 29.5 PG (ref 26–34)
MCHC RBC AUTO-ENTMCNC: 33.9 G/DL (ref 30–36.5)
MCV RBC AUTO: 87 FL (ref 80–99)
MONOCYTES # BLD: 0.3 K/UL (ref 0–1)
MONOCYTES NFR BLD: 9 % (ref 5–13)
N MEN DNA BLD POS QL NAA+NON-PROBE: NOT DETECTED
NEUTS SEG # BLD: 2.5 K/UL (ref 1.8–8)
NEUTS SEG NFR BLD: 62 % (ref 32–75)
NRBC # BLD: 0 K/UL (ref 0–0.01)
NRBC BLD-RTO: 0 PER 100 WBC
P AERUGINOSA DNA BLD POS NAA+NON-PROBE: NOT DETECTED
PLATELET # BLD AUTO: 157 K/UL (ref 150–400)
PMV BLD AUTO: 10.1 FL (ref 8.9–12.9)
POTASSIUM SERPL-SCNC: 4 MMOL/L (ref 3.5–5.1)
PROTEUS SP DNA BLD POS QL NAA+NON-PROBE: NOT DETECTED
RBC # BLD AUTO: 3.86 M/UL (ref 3.8–5.2)
RESISTANT GENE TARGETS: NORMAL
S AUREUS DNA BLD POS QL NAA+NON-PROBE: NOT DETECTED
S AUREUS+CONS DNA BLD POS NAA+NON-PROBE: NOT DETECTED
S EPIDERMIDIS DNA BLD POS QL NAA+NON-PRB: NOT DETECTED
S LUGDUNENSIS DNA BLD POS QL NAA+NON-PRB: NOT DETECTED
S MALTOPHILIA DNA BLD POS QL NAA+NON-PRB: NOT DETECTED
S MARCESCENS DNA BLD POS NAA+NON-PROBE: NOT DETECTED
S PNEUM DNA BLD POS QL NAA+NON-PROBE: NOT DETECTED
S PYO DNA BLD POS QL NAA+NON-PROBE: NOT DETECTED
SALMONELLA DNA BLD POS QL NAA+NON-PROBE: NOT DETECTED
SODIUM SERPL-SCNC: 138 MMOL/L (ref 136–145)
STREPTOCOCCUS DNA BLD POS NAA+NON-PROBE: NOT DETECTED
WBC # BLD AUTO: 3.9 K/UL (ref 3.6–11)

## 2023-09-05 PROCEDURE — 6370000000 HC RX 637 (ALT 250 FOR IP): Performed by: STUDENT IN AN ORGANIZED HEALTH CARE EDUCATION/TRAINING PROGRAM

## 2023-09-05 PROCEDURE — 80048 BASIC METABOLIC PNL TOTAL CA: CPT

## 2023-09-05 PROCEDURE — 2060000000 HC ICU INTERMEDIATE R&B

## 2023-09-05 PROCEDURE — 86704 HEP B CORE ANTIBODY TOTAL: CPT

## 2023-09-05 PROCEDURE — 97535 SELF CARE MNGMENT TRAINING: CPT

## 2023-09-05 PROCEDURE — 2500000003 HC RX 250 WO HCPCS: Performed by: INTERNAL MEDICINE

## 2023-09-05 PROCEDURE — 36415 COLL VENOUS BLD VENIPUNCTURE: CPT

## 2023-09-05 PROCEDURE — 6360000002 HC RX W HCPCS: Performed by: STUDENT IN AN ORGANIZED HEALTH CARE EDUCATION/TRAINING PROGRAM

## 2023-09-05 PROCEDURE — 86705 HEP B CORE ANTIBODY IGM: CPT

## 2023-09-05 PROCEDURE — 2580000003 HC RX 258: Performed by: INTERNAL MEDICINE

## 2023-09-05 PROCEDURE — 6370000000 HC RX 637 (ALT 250 FOR IP): Performed by: NURSE PRACTITIONER

## 2023-09-05 PROCEDURE — 83735 ASSAY OF MAGNESIUM: CPT

## 2023-09-05 PROCEDURE — 6360000002 HC RX W HCPCS: Performed by: NURSE PRACTITIONER

## 2023-09-05 PROCEDURE — 97116 GAIT TRAINING THERAPY: CPT

## 2023-09-05 PROCEDURE — 2580000003 HC RX 258: Performed by: STUDENT IN AN ORGANIZED HEALTH CARE EDUCATION/TRAINING PROGRAM

## 2023-09-05 PROCEDURE — 87389 HIV-1 AG W/HIV-1&-2 AB AG IA: CPT

## 2023-09-05 PROCEDURE — 99233 SBSQ HOSP IP/OBS HIGH 50: CPT | Performed by: INTERNAL MEDICINE

## 2023-09-05 PROCEDURE — C9113 INJ PANTOPRAZOLE SODIUM, VIA: HCPCS | Performed by: NURSE PRACTITIONER

## 2023-09-05 PROCEDURE — 85025 COMPLETE CBC W/AUTO DIFF WBC: CPT

## 2023-09-05 PROCEDURE — 97166 OT EVAL MOD COMPLEX 45 MIN: CPT

## 2023-09-05 PROCEDURE — 6360000002 HC RX W HCPCS: Performed by: INTERNAL MEDICINE

## 2023-09-05 PROCEDURE — 87340 HEPATITIS B SURFACE AG IA: CPT

## 2023-09-05 PROCEDURE — A4216 STERILE WATER/SALINE, 10 ML: HCPCS | Performed by: NURSE PRACTITIONER

## 2023-09-05 PROCEDURE — 97162 PT EVAL MOD COMPLEX 30 MIN: CPT

## 2023-09-05 PROCEDURE — 87522 HEPATITIS C REVRS TRNSCRPJ: CPT

## 2023-09-05 PROCEDURE — 2580000003 HC RX 258: Performed by: NURSE PRACTITIONER

## 2023-09-05 RX ADMIN — OXYCODONE HYDROCHLORIDE 10 MG: 5 TABLET ORAL at 02:43

## 2023-09-05 RX ADMIN — OXYCODONE HYDROCHLORIDE 10 MG: 5 TABLET ORAL at 07:56

## 2023-09-05 RX ADMIN — GABAPENTIN 1200 MG: 300 CAPSULE ORAL at 13:47

## 2023-09-05 RX ADMIN — Medication 1250 MG: at 08:03

## 2023-09-05 RX ADMIN — OXYCODONE HYDROCHLORIDE 10 MG: 5 TABLET ORAL at 21:12

## 2023-09-05 RX ADMIN — HYDROMORPHONE HYDROCHLORIDE 0.5 MG: 1 INJECTION, SOLUTION INTRAMUSCULAR; INTRAVENOUS; SUBCUTANEOUS at 05:05

## 2023-09-05 RX ADMIN — PIPERACILLIN AND TAZOBACTAM 3375 MG: 3; .375 INJECTION, POWDER, LYOPHILIZED, FOR SOLUTION INTRAVENOUS at 05:05

## 2023-09-05 RX ADMIN — PIPERACILLIN AND TAZOBACTAM 3375 MG: 3; .375 INJECTION, POWDER, LYOPHILIZED, FOR SOLUTION INTRAVENOUS at 21:13

## 2023-09-05 RX ADMIN — GABAPENTIN 1200 MG: 300 CAPSULE ORAL at 07:58

## 2023-09-05 RX ADMIN — OXYCODONE HYDROCHLORIDE 10 MG: 5 TABLET ORAL at 13:47

## 2023-09-05 RX ADMIN — SODIUM CHLORIDE, PRESERVATIVE FREE 40 MG: 5 INJECTION INTRAVENOUS at 07:57

## 2023-09-05 RX ADMIN — HYDROMORPHONE HYDROCHLORIDE 0.5 MG: 1 INJECTION, SOLUTION INTRAMUSCULAR; INTRAVENOUS; SUBCUTANEOUS at 11:48

## 2023-09-05 RX ADMIN — HYDROMORPHONE HYDROCHLORIDE 0.5 MG: 1 INJECTION, SOLUTION INTRAMUSCULAR; INTRAVENOUS; SUBCUTANEOUS at 00:37

## 2023-09-05 RX ADMIN — HYDROMORPHONE HYDROCHLORIDE 0.5 MG: 1 INJECTION, SOLUTION INTRAMUSCULAR; INTRAVENOUS; SUBCUTANEOUS at 19:58

## 2023-09-05 RX ADMIN — HYDROMORPHONE HYDROCHLORIDE 0.5 MG: 1 INJECTION, SOLUTION INTRAMUSCULAR; INTRAVENOUS; SUBCUTANEOUS at 23:32

## 2023-09-05 RX ADMIN — SODIUM CHLORIDE, PRESERVATIVE FREE 10 ML: 5 INJECTION INTRAVENOUS at 07:59

## 2023-09-05 RX ADMIN — SODIUM CHLORIDE, PRESERVATIVE FREE 10 ML: 5 INJECTION INTRAVENOUS at 20:08

## 2023-09-05 RX ADMIN — OXYCODONE HYDROCHLORIDE 10 MG: 5 TABLET ORAL at 17:31

## 2023-09-05 RX ADMIN — GABAPENTIN 1200 MG: 300 CAPSULE ORAL at 20:09

## 2023-09-05 RX ADMIN — PIPERACILLIN AND TAZOBACTAM 3375 MG: 3; .375 INJECTION, POWDER, LYOPHILIZED, FOR SOLUTION INTRAVENOUS at 12:36

## 2023-09-05 RX ADMIN — HYDROMORPHONE HYDROCHLORIDE 0.5 MG: 1 INJECTION, SOLUTION INTRAMUSCULAR; INTRAVENOUS; SUBCUTANEOUS at 16:17

## 2023-09-05 RX ADMIN — Medication 1250 MG: at 20:05

## 2023-09-05 ASSESSMENT — PAIN DESCRIPTION - LOCATION
LOCATION: BACK

## 2023-09-05 ASSESSMENT — PAIN DESCRIPTION - DESCRIPTORS
DESCRIPTORS: ACHING
DESCRIPTORS: ACHING;STABBING
DESCRIPTORS: ACHING
DESCRIPTORS: ACHING;STABBING
DESCRIPTORS: ACHING
DESCRIPTORS: SHARP;ACHING;STABBING
DESCRIPTORS: ACHING

## 2023-09-05 ASSESSMENT — PAIN DESCRIPTION - FREQUENCY
FREQUENCY: CONTINUOUS

## 2023-09-05 ASSESSMENT — PAIN DESCRIPTION - ONSET
ONSET: ON-GOING

## 2023-09-05 ASSESSMENT — PAIN SCALES - GENERAL
PAINLEVEL_OUTOF10: 10
PAINLEVEL_OUTOF10: 10
PAINLEVEL_OUTOF10: 8
PAINLEVEL_OUTOF10: 8
PAINLEVEL_OUTOF10: 10
PAINLEVEL_OUTOF10: 8
PAINLEVEL_OUTOF10: 10
PAINLEVEL_OUTOF10: 8
PAINLEVEL_OUTOF10: 9
PAINLEVEL_OUTOF10: 10
PAINLEVEL_OUTOF10: 8
PAINLEVEL_OUTOF10: 8
PAINLEVEL_OUTOF10: 10
PAINLEVEL_OUTOF10: 8
PAINLEVEL_OUTOF10: 8
PAINLEVEL_OUTOF10: 9

## 2023-09-05 ASSESSMENT — PAIN DESCRIPTION - ORIENTATION
ORIENTATION: LOWER
ORIENTATION: LOWER;MID
ORIENTATION: MID
ORIENTATION: MID;LOWER
ORIENTATION: LOWER
ORIENTATION: MID;LOWER
ORIENTATION: POSTERIOR
ORIENTATION: MID
ORIENTATION: MID;LOWER
ORIENTATION: MID;LOWER
ORIENTATION: POSTERIOR
ORIENTATION: LOWER
ORIENTATION: LOWER;MID
ORIENTATION: MID;LOWER
ORIENTATION: MID;LOWER

## 2023-09-05 ASSESSMENT — PAIN DESCRIPTION - PAIN TYPE
TYPE: CHRONIC PAIN
TYPE: CHRONIC PAIN

## 2023-09-05 NOTE — CARE COORDINATION
Care Management Initial Assessment       RUR: 12%  Readmission? No  1st IM letter given? No  1st  letter given: No     09/05/23 1516   Service Assessment   Patient Orientation Alert and Oriented;Person;Place;Situation;Self   Cognition Alert   History Provided By Spouse   Primary Caregiver Spouse   Accompanied By/Relationship Spouse [address] Spouse/Significant Other;Parent   Patient's Healthcare Decision Maker is: Legal Next of Kin  (spouse Alan Francis)   PCP Verified by CM   (Pt does not have a PCP)   Prior Functional Level Independent in ADLs/IADLs   Current Functional Level Other (see comment)  (currently needing assistance)   Can patient return to prior living arrangement Yes   Ability to make needs known: Good   Family able to assist with home care needs: Yes   Financial Resources Medicaid   Social/Functional History   Lives With Spouse   Type of [address] [place] [address] None   ADL Assistance Independent   Active  No   Patient's  Info spouse   Mode of Transportation Car   Discharge Planning   Living Arrangements Spouse/Significant Other   DME Ordered? Walker   Patient expects to be discharged to: [place]      CM spoke with Pt's spouse to complete initial assessment. Pt does not have a PCP at this time. Pt would like for CM to arrange a new PCP appointment. Per therapy recommendation HH w/ a rolling walker and shower chair is needed for discharge. Pt and spouse are agreeable to recommendation.        Jazmyne Khoury, SEVEN Ashraf

## 2023-09-06 PROBLEM — F17.200 CURRENT SMOKER: Status: ACTIVE | Noted: 2023-09-06

## 2023-09-06 PROBLEM — R78.81 GRAM-POSITIVE COCCI BACTEREMIA: Status: ACTIVE | Noted: 2023-09-06

## 2023-09-06 PROBLEM — K74.69 OTHER CIRRHOSIS OF LIVER (HCC): Status: ACTIVE | Noted: 2023-09-06

## 2023-09-06 PROBLEM — Z87.448 HX OF ACUTE PYELONEPHRITIS: Status: ACTIVE | Noted: 2023-09-06

## 2023-09-06 PROBLEM — F19.90 IVDU (INTRAVENOUS DRUG USER): Status: ACTIVE | Noted: 2023-09-06

## 2023-09-06 LAB
ANION GAP SERPL CALC-SCNC: 4 MMOL/L (ref 5–15)
BASOPHILS # BLD: 0 K/UL (ref 0–0.1)
BASOPHILS NFR BLD: 1 % (ref 0–1)
BUN SERPL-MCNC: 6 MG/DL (ref 6–20)
BUN/CREAT SERPL: 6 (ref 12–20)
CALCIUM SERPL-MCNC: 8.9 MG/DL (ref 8.5–10.1)
CHLORIDE SERPL-SCNC: 104 MMOL/L (ref 97–108)
CO2 SERPL-SCNC: 26 MMOL/L (ref 21–32)
CREAT SERPL-MCNC: 1.06 MG/DL (ref 0.55–1.02)
DIFFERENTIAL METHOD BLD: ABNORMAL
EOSINOPHIL # BLD: 0.1 K/UL (ref 0–0.4)
EOSINOPHIL NFR BLD: 4 % (ref 0–7)
ERYTHROCYTE [DISTWIDTH] IN BLOOD BY AUTOMATED COUNT: 11.9 % (ref 11.5–14.5)
GLUCOSE SERPL-MCNC: 121 MG/DL (ref 65–100)
HBV CORE AB SERPL QL IA: NEGATIVE
HBV CORE IGM SERPL QL IA: NEGATIVE
HCT VFR BLD AUTO: 36.5 % (ref 35–47)
HCV GENTYP SERPL NAA+PROBE: NORMAL
HCV RNA SERPL NAA+PROBE-ACNC: NORMAL IU/ML
HCV RNA SERPL NAA+PROBE-LOG IU: NORMAL LOG10 IU/ML
HGB BLD-MCNC: 12.1 G/DL (ref 11.5–16)
IMM GRANULOCYTES # BLD AUTO: 0 K/UL (ref 0–0.04)
IMM GRANULOCYTES NFR BLD AUTO: 0 % (ref 0–0.5)
LABORATORY COMMENT REPORT: NORMAL
LYMPHOCYTES # BLD: 1.2 K/UL (ref 0.8–3.5)
LYMPHOCYTES NFR BLD: 38 % (ref 12–49)
MCH RBC QN AUTO: 28.9 PG (ref 26–34)
MCHC RBC AUTO-ENTMCNC: 33.2 G/DL (ref 30–36.5)
MCV RBC AUTO: 87.1 FL (ref 80–99)
MONOCYTES # BLD: 0.4 K/UL (ref 0–1)
MONOCYTES NFR BLD: 12 % (ref 5–13)
NEUTS SEG # BLD: 1.3 K/UL (ref 1.8–8)
NEUTS SEG NFR BLD: 45 % (ref 32–75)
NRBC # BLD: 0 K/UL (ref 0–0.01)
NRBC BLD-RTO: 0 PER 100 WBC
PLATELET # BLD AUTO: 176 K/UL (ref 150–400)
PMV BLD AUTO: 10.3 FL (ref 8.9–12.9)
POTASSIUM SERPL-SCNC: 3.9 MMOL/L (ref 3.5–5.1)
RBC # BLD AUTO: 4.19 M/UL (ref 3.8–5.2)
SODIUM SERPL-SCNC: 134 MMOL/L (ref 136–145)
VANCOMYCIN SERPL-MCNC: 22.4 UG/ML
WBC # BLD AUTO: 3 K/UL (ref 3.6–11)

## 2023-09-06 PROCEDURE — 6370000000 HC RX 637 (ALT 250 FOR IP): Performed by: NURSE PRACTITIONER

## 2023-09-06 PROCEDURE — 6360000002 HC RX W HCPCS: Performed by: INTERNAL MEDICINE

## 2023-09-06 PROCEDURE — C9113 INJ PANTOPRAZOLE SODIUM, VIA: HCPCS | Performed by: NURSE PRACTITIONER

## 2023-09-06 PROCEDURE — 2500000003 HC RX 250 WO HCPCS: Performed by: INTERNAL MEDICINE

## 2023-09-06 PROCEDURE — 2580000003 HC RX 258: Performed by: NURSE PRACTITIONER

## 2023-09-06 PROCEDURE — 2580000003 HC RX 258: Performed by: INTERNAL MEDICINE

## 2023-09-06 PROCEDURE — 2580000003 HC RX 258: Performed by: STUDENT IN AN ORGANIZED HEALTH CARE EDUCATION/TRAINING PROGRAM

## 2023-09-06 PROCEDURE — 6370000000 HC RX 637 (ALT 250 FOR IP): Performed by: STUDENT IN AN ORGANIZED HEALTH CARE EDUCATION/TRAINING PROGRAM

## 2023-09-06 PROCEDURE — 36415 COLL VENOUS BLD VENIPUNCTURE: CPT

## 2023-09-06 PROCEDURE — 85025 COMPLETE CBC W/AUTO DIFF WBC: CPT

## 2023-09-06 PROCEDURE — 80202 ASSAY OF VANCOMYCIN: CPT

## 2023-09-06 PROCEDURE — 2060000000 HC ICU INTERMEDIATE R&B

## 2023-09-06 PROCEDURE — 80048 BASIC METABOLIC PNL TOTAL CA: CPT

## 2023-09-06 PROCEDURE — 99233 SBSQ HOSP IP/OBS HIGH 50: CPT | Performed by: INTERNAL MEDICINE

## 2023-09-06 PROCEDURE — A4216 STERILE WATER/SALINE, 10 ML: HCPCS | Performed by: NURSE PRACTITIONER

## 2023-09-06 PROCEDURE — 6360000002 HC RX W HCPCS: Performed by: STUDENT IN AN ORGANIZED HEALTH CARE EDUCATION/TRAINING PROGRAM

## 2023-09-06 PROCEDURE — 6360000002 HC RX W HCPCS: Performed by: NURSE PRACTITIONER

## 2023-09-06 RX ADMIN — SODIUM CHLORIDE: 9 INJECTION, SOLUTION INTRAVENOUS at 06:15

## 2023-09-06 RX ADMIN — HYDROMORPHONE HYDROCHLORIDE 0.5 MG: 1 INJECTION, SOLUTION INTRAMUSCULAR; INTRAVENOUS; SUBCUTANEOUS at 13:37

## 2023-09-06 RX ADMIN — SODIUM CHLORIDE, PRESERVATIVE FREE 10 ML: 5 INJECTION INTRAVENOUS at 19:55

## 2023-09-06 RX ADMIN — SODIUM CHLORIDE, PRESERVATIVE FREE 40 MG: 5 INJECTION INTRAVENOUS at 08:45

## 2023-09-06 RX ADMIN — SODIUM CHLORIDE: 9 INJECTION, SOLUTION INTRAVENOUS at 22:10

## 2023-09-06 RX ADMIN — HYDROMORPHONE HYDROCHLORIDE 0.5 MG: 1 INJECTION, SOLUTION INTRAMUSCULAR; INTRAVENOUS; SUBCUTANEOUS at 03:36

## 2023-09-06 RX ADMIN — PIPERACILLIN AND TAZOBACTAM 3375 MG: 3; .375 INJECTION, POWDER, LYOPHILIZED, FOR SOLUTION INTRAVENOUS at 13:36

## 2023-09-06 RX ADMIN — GABAPENTIN 1200 MG: 300 CAPSULE ORAL at 13:37

## 2023-09-06 RX ADMIN — HYDROMORPHONE HYDROCHLORIDE 0.5 MG: 1 INJECTION, SOLUTION INTRAMUSCULAR; INTRAVENOUS; SUBCUTANEOUS at 22:08

## 2023-09-06 RX ADMIN — OXYCODONE HYDROCHLORIDE 10 MG: 5 TABLET ORAL at 10:15

## 2023-09-06 RX ADMIN — Medication 1250 MG: at 20:00

## 2023-09-06 RX ADMIN — Medication 1250 MG: at 08:43

## 2023-09-06 RX ADMIN — PIPERACILLIN AND TAZOBACTAM 3375 MG: 3; .375 INJECTION, POWDER, LYOPHILIZED, FOR SOLUTION INTRAVENOUS at 05:40

## 2023-09-06 RX ADMIN — GABAPENTIN 1200 MG: 300 CAPSULE ORAL at 20:00

## 2023-09-06 RX ADMIN — GABAPENTIN 1200 MG: 300 CAPSULE ORAL at 08:43

## 2023-09-06 RX ADMIN — HYDROMORPHONE HYDROCHLORIDE 0.5 MG: 1 INJECTION, SOLUTION INTRAMUSCULAR; INTRAVENOUS; SUBCUTANEOUS at 08:44

## 2023-09-06 RX ADMIN — OXYCODONE HYDROCHLORIDE 10 MG: 5 TABLET ORAL at 19:54

## 2023-09-06 RX ADMIN — OXYCODONE HYDROCHLORIDE 10 MG: 5 TABLET ORAL at 05:40

## 2023-09-06 RX ADMIN — PIPERACILLIN AND TAZOBACTAM 3375 MG: 3; .375 INJECTION, POWDER, LYOPHILIZED, FOR SOLUTION INTRAVENOUS at 22:12

## 2023-09-06 RX ADMIN — SODIUM CHLORIDE, PRESERVATIVE FREE 10 ML: 5 INJECTION INTRAVENOUS at 08:46

## 2023-09-06 RX ADMIN — OXYCODONE HYDROCHLORIDE 10 MG: 5 TABLET ORAL at 01:24

## 2023-09-06 RX ADMIN — OXYCODONE HYDROCHLORIDE 10 MG: 5 TABLET ORAL at 15:45

## 2023-09-06 RX ADMIN — HYDROMORPHONE HYDROCHLORIDE 0.5 MG: 1 INJECTION, SOLUTION INTRAMUSCULAR; INTRAVENOUS; SUBCUTANEOUS at 17:39

## 2023-09-06 ASSESSMENT — PAIN DESCRIPTION - DESCRIPTORS
DESCRIPTORS: ACHING
DESCRIPTORS: ACHING
DESCRIPTORS: ACHING;SHARP
DESCRIPTORS: ACHING
DESCRIPTORS: ACHING;SPASM
DESCRIPTORS: ACHING

## 2023-09-06 ASSESSMENT — PAIN DESCRIPTION - ORIENTATION
ORIENTATION: POSTERIOR;LOWER
ORIENTATION: LOWER
ORIENTATION: POSTERIOR;LOWER
ORIENTATION: LOWER

## 2023-09-06 ASSESSMENT — PAIN DESCRIPTION - LOCATION
LOCATION: BACK

## 2023-09-06 ASSESSMENT — PAIN SCALES - GENERAL
PAINLEVEL_OUTOF10: 10
PAINLEVEL_OUTOF10: 8
PAINLEVEL_OUTOF10: 9
PAINLEVEL_OUTOF10: 8
PAINLEVEL_OUTOF10: 9
PAINLEVEL_OUTOF10: 10
PAINLEVEL_OUTOF10: 9
PAINLEVEL_OUTOF10: 9
PAINLEVEL_OUTOF10: 10
PAINLEVEL_OUTOF10: 10
PAINLEVEL_OUTOF10: 8
PAINLEVEL_OUTOF10: 10
PAINLEVEL_OUTOF10: 8
PAINLEVEL_OUTOF10: 9
PAINLEVEL_OUTOF10: 8
PAINLEVEL_OUTOF10: 9
PAINLEVEL_OUTOF10: 8
PAINLEVEL_OUTOF10: 8

## 2023-09-06 ASSESSMENT — PAIN DESCRIPTION - FREQUENCY
FREQUENCY: CONTINUOUS

## 2023-09-06 ASSESSMENT — PAIN DESCRIPTION - PAIN TYPE
TYPE: CHRONIC PAIN

## 2023-09-06 ASSESSMENT — PAIN - FUNCTIONAL ASSESSMENT
PAIN_FUNCTIONAL_ASSESSMENT: PREVENTS OR INTERFERES SOME ACTIVE ACTIVITIES AND ADLS

## 2023-09-06 ASSESSMENT — PAIN DESCRIPTION - ONSET
ONSET: ON-GOING

## 2023-09-07 ENCOUNTER — TELEPHONE (OUTPATIENT)
Age: 43
End: 2023-09-07

## 2023-09-07 LAB
BACTERIA SPEC CULT: NORMAL
SERVICE CMNT-IMP: NORMAL

## 2023-09-07 PROCEDURE — 1100000000 HC RM PRIVATE

## 2023-09-07 PROCEDURE — 2580000003 HC RX 258: Performed by: STUDENT IN AN ORGANIZED HEALTH CARE EDUCATION/TRAINING PROGRAM

## 2023-09-07 PROCEDURE — 6360000002 HC RX W HCPCS: Performed by: STUDENT IN AN ORGANIZED HEALTH CARE EDUCATION/TRAINING PROGRAM

## 2023-09-07 PROCEDURE — 6370000000 HC RX 637 (ALT 250 FOR IP): Performed by: NURSE PRACTITIONER

## 2023-09-07 PROCEDURE — 6370000000 HC RX 637 (ALT 250 FOR IP): Performed by: STUDENT IN AN ORGANIZED HEALTH CARE EDUCATION/TRAINING PROGRAM

## 2023-09-07 PROCEDURE — C9113 INJ PANTOPRAZOLE SODIUM, VIA: HCPCS | Performed by: NURSE PRACTITIONER

## 2023-09-07 PROCEDURE — 2580000003 HC RX 258: Performed by: INTERNAL MEDICINE

## 2023-09-07 PROCEDURE — 6360000002 HC RX W HCPCS: Performed by: INTERNAL MEDICINE

## 2023-09-07 PROCEDURE — 2500000003 HC RX 250 WO HCPCS: Performed by: INTERNAL MEDICINE

## 2023-09-07 PROCEDURE — 99233 SBSQ HOSP IP/OBS HIGH 50: CPT | Performed by: INTERNAL MEDICINE

## 2023-09-07 PROCEDURE — 2580000003 HC RX 258: Performed by: NURSE PRACTITIONER

## 2023-09-07 PROCEDURE — 97116 GAIT TRAINING THERAPY: CPT

## 2023-09-07 PROCEDURE — A4216 STERILE WATER/SALINE, 10 ML: HCPCS | Performed by: NURSE PRACTITIONER

## 2023-09-07 PROCEDURE — 6360000002 HC RX W HCPCS: Performed by: NURSE PRACTITIONER

## 2023-09-07 RX ADMIN — HYDROMORPHONE HYDROCHLORIDE 0.5 MG: 1 INJECTION, SOLUTION INTRAMUSCULAR; INTRAVENOUS; SUBCUTANEOUS at 13:57

## 2023-09-07 RX ADMIN — CEFEPIME 2000 MG: 2 INJECTION, POWDER, FOR SOLUTION INTRAVENOUS at 22:45

## 2023-09-07 RX ADMIN — OXYCODONE HYDROCHLORIDE 10 MG: 5 TABLET ORAL at 00:53

## 2023-09-07 RX ADMIN — HYDROMORPHONE HYDROCHLORIDE 0.5 MG: 1 INJECTION, SOLUTION INTRAMUSCULAR; INTRAVENOUS; SUBCUTANEOUS at 02:04

## 2023-09-07 RX ADMIN — CEFEPIME 2000 MG: 2 INJECTION, POWDER, FOR SOLUTION INTRAVENOUS at 13:57

## 2023-09-07 RX ADMIN — OXYCODONE HYDROCHLORIDE 10 MG: 5 TABLET ORAL at 18:15

## 2023-09-07 RX ADMIN — OXYCODONE HYDROCHLORIDE 10 MG: 5 TABLET ORAL at 09:55

## 2023-09-07 RX ADMIN — SODIUM CHLORIDE, PRESERVATIVE FREE 10 ML: 5 INJECTION INTRAVENOUS at 21:04

## 2023-09-07 RX ADMIN — HYDROMORPHONE HYDROCHLORIDE 0.5 MG: 1 INJECTION, SOLUTION INTRAMUSCULAR; INTRAVENOUS; SUBCUTANEOUS at 08:32

## 2023-09-07 RX ADMIN — Medication 1250 MG: at 09:06

## 2023-09-07 RX ADMIN — ENOXAPARIN SODIUM 40 MG: 100 INJECTION SUBCUTANEOUS at 09:09

## 2023-09-07 RX ADMIN — HYDROMORPHONE HYDROCHLORIDE 0.5 MG: 1 INJECTION, SOLUTION INTRAMUSCULAR; INTRAVENOUS; SUBCUTANEOUS at 18:07

## 2023-09-07 RX ADMIN — Medication 1250 MG: at 21:04

## 2023-09-07 RX ADMIN — SODIUM CHLORIDE, PRESERVATIVE FREE 40 MG: 5 INJECTION INTRAVENOUS at 09:08

## 2023-09-07 RX ADMIN — OXYCODONE HYDROCHLORIDE 10 MG: 5 TABLET ORAL at 22:45

## 2023-09-07 RX ADMIN — GABAPENTIN 1200 MG: 300 CAPSULE ORAL at 21:04

## 2023-09-07 RX ADMIN — OXYCODONE HYDROCHLORIDE 10 MG: 5 TABLET ORAL at 05:33

## 2023-09-07 RX ADMIN — SENNOSIDES AND DOCUSATE SODIUM 2 TABLET: 50; 8.6 TABLET ORAL at 09:08

## 2023-09-07 RX ADMIN — CEFEPIME 2000 MG: 2 INJECTION, POWDER, FOR SOLUTION INTRAVENOUS at 05:34

## 2023-09-07 RX ADMIN — GABAPENTIN 1200 MG: 300 CAPSULE ORAL at 13:57

## 2023-09-07 RX ADMIN — OXYCODONE HYDROCHLORIDE 10 MG: 5 TABLET ORAL at 14:15

## 2023-09-07 RX ADMIN — GABAPENTIN 1200 MG: 300 CAPSULE ORAL at 09:07

## 2023-09-07 RX ADMIN — SODIUM CHLORIDE, PRESERVATIVE FREE 10 ML: 5 INJECTION INTRAVENOUS at 09:07

## 2023-09-07 ASSESSMENT — PAIN DESCRIPTION - ORIENTATION
ORIENTATION: LOWER
ORIENTATION: LOWER;POSTERIOR
ORIENTATION: LOWER
ORIENTATION: LOWER;POSTERIOR
ORIENTATION: LOWER;POSTERIOR
ORIENTATION: LOWER

## 2023-09-07 ASSESSMENT — PAIN DESCRIPTION - LOCATION
LOCATION: BACK
LOCATION: ABDOMEN

## 2023-09-07 ASSESSMENT — PAIN - FUNCTIONAL ASSESSMENT
PAIN_FUNCTIONAL_ASSESSMENT: PREVENTS OR INTERFERES SOME ACTIVE ACTIVITIES AND ADLS
PAIN_FUNCTIONAL_ASSESSMENT: PREVENTS OR INTERFERES SOME ACTIVE ACTIVITIES AND ADLS
PAIN_FUNCTIONAL_ASSESSMENT: PREVENTS OR INTERFERES WITH MANY ACTIVE NOT PASSIVE ACTIVITIES

## 2023-09-07 ASSESSMENT — PAIN SCALES - GENERAL
PAINLEVEL_OUTOF10: 9
PAINLEVEL_OUTOF10: 10
PAINLEVEL_OUTOF10: 9
PAINLEVEL_OUTOF10: 8
PAINLEVEL_OUTOF10: 8
PAINLEVEL_OUTOF10: 9
PAINLEVEL_OUTOF10: 10
PAINLEVEL_OUTOF10: 9
PAINLEVEL_OUTOF10: 9
PAINLEVEL_OUTOF10: 10
PAINLEVEL_OUTOF10: 10

## 2023-09-07 ASSESSMENT — PAIN DESCRIPTION - DESCRIPTORS
DESCRIPTORS: ACHING
DESCRIPTORS: ACHING;SPASM;THROBBING
DESCRIPTORS: ACHING
DESCRIPTORS: ACHING
DESCRIPTORS: ACHING;SPASM;THROBBING
DESCRIPTORS: ACHING;SPASM;THROBBING

## 2023-09-07 NOTE — CARE COORDINATION
Transition of Care Plan:    RUR: 11%  Disposition: [address] vs [facility] transfer   If SNF or IPR: Date FOC offered:   Date FOC received:  Date authorization started with reference number:  Date authorization received and expires:  Accepting facility:  Follow up appointments: defer to facility   DME needed:  Transportation at [address]: Carmel Montes (Parent)   [phone]  Discharge Caregiver contacted prior to discharge? If Pt desires   Care Conference needed?:  no                        CM spoke with Pt regarding plan for discharge. Pt is aware that she will need to discharge to a facility for Long-term IV ABX. CM inquired about Pt potentially transferring to [facility]. Pt stated that she prefers to be closer to home as [facility] is about 3hrs away from where she lives. Pt requested CM to look into [address] CM sent a request to [address] to verify if they are able to accept Pt. CM informed Pt if [address] is unable to accept, [facility] will have to be considered as options are limited.        Jazmyne Navarro, SEVEN Ashraf

## 2023-09-07 NOTE — CARE COORDINATION
Spoke with Monika Gutierrez from [facility] (transport). Trip number [id number]    Time spent obtaining auth : 2 hours.

## 2023-09-08 LAB
BACTERIA SPEC CULT: ABNORMAL
SERVICE CMNT-IMP: ABNORMAL
VANCOMYCIN SERPL-MCNC: 23.7 UG/ML

## 2023-09-08 PROCEDURE — 2500000003 HC RX 250 WO HCPCS: Performed by: INTERNAL MEDICINE

## 2023-09-08 PROCEDURE — 6360000002 HC RX W HCPCS: Performed by: NURSE PRACTITIONER

## 2023-09-08 PROCEDURE — 2580000003 HC RX 258: Performed by: STUDENT IN AN ORGANIZED HEALTH CARE EDUCATION/TRAINING PROGRAM

## 2023-09-08 PROCEDURE — 36415 COLL VENOUS BLD VENIPUNCTURE: CPT

## 2023-09-08 PROCEDURE — 97530 THERAPEUTIC ACTIVITIES: CPT

## 2023-09-08 PROCEDURE — 6370000000 HC RX 637 (ALT 250 FOR IP): Performed by: INTERNAL MEDICINE

## 2023-09-08 PROCEDURE — 6370000000 HC RX 637 (ALT 250 FOR IP): Performed by: STUDENT IN AN ORGANIZED HEALTH CARE EDUCATION/TRAINING PROGRAM

## 2023-09-08 PROCEDURE — 6360000002 HC RX W HCPCS: Performed by: STUDENT IN AN ORGANIZED HEALTH CARE EDUCATION/TRAINING PROGRAM

## 2023-09-08 PROCEDURE — 6370000000 HC RX 637 (ALT 250 FOR IP): Performed by: NURSE PRACTITIONER

## 2023-09-08 PROCEDURE — C9113 INJ PANTOPRAZOLE SODIUM, VIA: HCPCS | Performed by: NURSE PRACTITIONER

## 2023-09-08 PROCEDURE — 80202 ASSAY OF VANCOMYCIN: CPT

## 2023-09-08 PROCEDURE — 1100000000 HC RM PRIVATE

## 2023-09-08 PROCEDURE — 2580000003 HC RX 258: Performed by: INTERNAL MEDICINE

## 2023-09-08 PROCEDURE — 6360000002 HC RX W HCPCS: Performed by: INTERNAL MEDICINE

## 2023-09-08 PROCEDURE — 2580000003 HC RX 258: Performed by: NURSE PRACTITIONER

## 2023-09-08 PROCEDURE — 97116 GAIT TRAINING THERAPY: CPT

## 2023-09-08 PROCEDURE — A4216 STERILE WATER/SALINE, 10 ML: HCPCS | Performed by: NURSE PRACTITIONER

## 2023-09-08 RX ORDER — ALUMINUM ZIRCONIUM OCTACHLOROHYDREX GLY 16 G/100G
1 GEL TOPICAL DAILY
Status: DISCONTINUED | OUTPATIENT
Start: 2023-09-08 | End: 2023-09-13 | Stop reason: HOSPADM

## 2023-09-08 RX ADMIN — OXYCODONE HYDROCHLORIDE 10 MG: 5 TABLET ORAL at 12:30

## 2023-09-08 RX ADMIN — OXYCODONE HYDROCHLORIDE 10 MG: 5 TABLET ORAL at 02:49

## 2023-09-08 RX ADMIN — GABAPENTIN 1200 MG: 300 CAPSULE ORAL at 21:36

## 2023-09-08 RX ADMIN — VANCOMYCIN HYDROCHLORIDE 1000 MG: 1 INJECTION, POWDER, LYOPHILIZED, FOR SOLUTION INTRAVENOUS at 14:19

## 2023-09-08 RX ADMIN — OXYCODONE HYDROCHLORIDE 10 MG: 5 TABLET ORAL at 17:42

## 2023-09-08 RX ADMIN — VANCOMYCIN HYDROCHLORIDE 1000 MG: 1 INJECTION, POWDER, LYOPHILIZED, FOR SOLUTION INTRAVENOUS at 22:47

## 2023-09-08 RX ADMIN — SODIUM CHLORIDE, PRESERVATIVE FREE 40 MG: 5 INJECTION INTRAVENOUS at 08:52

## 2023-09-08 RX ADMIN — HYDROMORPHONE HYDROCHLORIDE 0.5 MG: 1 INJECTION, SOLUTION INTRAMUSCULAR; INTRAVENOUS; SUBCUTANEOUS at 19:58

## 2023-09-08 RX ADMIN — HYDROMORPHONE HYDROCHLORIDE 0.5 MG: 1 INJECTION, SOLUTION INTRAMUSCULAR; INTRAVENOUS; SUBCUTANEOUS at 10:30

## 2023-09-08 RX ADMIN — OXYCODONE HYDROCHLORIDE 10 MG: 5 TABLET ORAL at 21:36

## 2023-09-08 RX ADMIN — CEFEPIME 2000 MG: 2 INJECTION, POWDER, FOR SOLUTION INTRAVENOUS at 05:32

## 2023-09-08 RX ADMIN — SENNOSIDES AND DOCUSATE SODIUM 2 TABLET: 50; 8.6 TABLET ORAL at 08:52

## 2023-09-08 RX ADMIN — HYDROMORPHONE HYDROCHLORIDE 0.5 MG: 1 INJECTION, SOLUTION INTRAMUSCULAR; INTRAVENOUS; SUBCUTANEOUS at 15:15

## 2023-09-08 RX ADMIN — GABAPENTIN 1200 MG: 300 CAPSULE ORAL at 15:32

## 2023-09-08 RX ADMIN — Medication 1 CAPSULE: at 12:30

## 2023-09-08 RX ADMIN — ENOXAPARIN SODIUM 40 MG: 100 INJECTION SUBCUTANEOUS at 08:53

## 2023-09-08 RX ADMIN — HYDROMORPHONE HYDROCHLORIDE 0.5 MG: 1 INJECTION, SOLUTION INTRAMUSCULAR; INTRAVENOUS; SUBCUTANEOUS at 00:13

## 2023-09-08 RX ADMIN — GABAPENTIN 1200 MG: 300 CAPSULE ORAL at 08:52

## 2023-09-08 RX ADMIN — HYDROMORPHONE HYDROCHLORIDE 0.5 MG: 1 INJECTION, SOLUTION INTRAMUSCULAR; INTRAVENOUS; SUBCUTANEOUS at 05:05

## 2023-09-08 RX ADMIN — SODIUM CHLORIDE, PRESERVATIVE FREE 10 ML: 5 INJECTION INTRAVENOUS at 08:51

## 2023-09-08 RX ADMIN — ONDANSETRON 4 MG: 4 TABLET, ORALLY DISINTEGRATING ORAL at 21:35

## 2023-09-08 RX ADMIN — CEFEPIME 2000 MG: 2 INJECTION, POWDER, FOR SOLUTION INTRAVENOUS at 17:42

## 2023-09-08 RX ADMIN — OXYCODONE HYDROCHLORIDE 10 MG: 5 TABLET ORAL at 08:52

## 2023-09-08 ASSESSMENT — PAIN DESCRIPTION - LOCATION
LOCATION: BACK

## 2023-09-08 ASSESSMENT — PAIN SCALES - GENERAL
PAINLEVEL_OUTOF10: 10
PAINLEVEL_OUTOF10: 10
PAINLEVEL_OUTOF10: 7
PAINLEVEL_OUTOF10: 9
PAINLEVEL_OUTOF10: 0
PAINLEVEL_OUTOF10: 0
PAINLEVEL_OUTOF10: 9
PAINLEVEL_OUTOF10: 10
PAINLEVEL_OUTOF10: 9
PAINLEVEL_OUTOF10: 10
PAINLEVEL_OUTOF10: 8
PAINLEVEL_OUTOF10: 10

## 2023-09-08 ASSESSMENT — PAIN DESCRIPTION - ORIENTATION
ORIENTATION: LOWER;MID;POSTERIOR
ORIENTATION: POSTERIOR;LOWER
ORIENTATION: LOWER
ORIENTATION: MID;LOWER
ORIENTATION: POSTERIOR;LOWER
ORIENTATION: LOWER
ORIENTATION: POSTERIOR;LOWER
ORIENTATION: LOWER
ORIENTATION: POSTERIOR;MID;LOWER
ORIENTATION: MID;LOWER

## 2023-09-08 ASSESSMENT — PAIN DESCRIPTION - FREQUENCY
FREQUENCY: CONTINUOUS

## 2023-09-08 ASSESSMENT — PAIN DESCRIPTION - DESCRIPTORS
DESCRIPTORS: ACHING;SHARP
DESCRIPTORS: ACHING
DESCRIPTORS: ACHING;STABBING;SHARP
DESCRIPTORS: ACHING;DISCOMFORT
DESCRIPTORS: THROBBING;STABBING
DESCRIPTORS: ACHING;STABBING;DULL
DESCRIPTORS: ACHING;SHARP;STABBING
DESCRIPTORS: ACHING;DISCOMFORT
DESCRIPTORS: ACHING;SHARP;STABBING
DESCRIPTORS: ACHING;DISCOMFORT;SHARP;STABBING
DESCRIPTORS: ACHING;DULL;STABBING

## 2023-09-08 ASSESSMENT — PAIN DESCRIPTION - ONSET
ONSET: ON-GOING

## 2023-09-08 ASSESSMENT — PAIN - FUNCTIONAL ASSESSMENT
PAIN_FUNCTIONAL_ASSESSMENT: PREVENTS OR INTERFERES SOME ACTIVE ACTIVITIES AND ADLS
PAIN_FUNCTIONAL_ASSESSMENT: PREVENTS OR INTERFERES WITH MANY ACTIVE NOT PASSIVE ACTIVITIES
PAIN_FUNCTIONAL_ASSESSMENT: PREVENTS OR INTERFERES SOME ACTIVE ACTIVITIES AND ADLS
PAIN_FUNCTIONAL_ASSESSMENT: PREVENTS OR INTERFERES SOME ACTIVE ACTIVITIES AND ADLS

## 2023-09-08 ASSESSMENT — PAIN DESCRIPTION - PAIN TYPE
TYPE: CHRONIC PAIN

## 2023-09-08 NOTE — CARE COORDINATION
Transition of Care Plan:     RUR: 11%  Disposition: Pending review for [facility] transfer   If SNF or IPR: Date FOC offered:   Date FOC received:  Date authorization started with reference number:  Date authorization received and expires:  Accepting facility:  Follow up appointments: defer to facility   DME needed:  Transportation at [address]: Mariusz Prieto (Parent)   [phone]  Discharge Caregiver contacted prior to discharge? If Pt desires   Care Conference needed?:  no              CM made aware that [facility] would not be an option for pt to transition to for long term IV ABX. No return call to date from [facility]. CM placed call to pt to review. Pt would like option closer to home, but understands current barriers. Pt gave CM permission to pursue seeing if [facility] could accept, email sent to [facility]. Pt also requests a letter from Hospitalist stating the days that she has been hospitalized to provide for her , and would like to speak with physician about concerns with staying in hospital for duration of IV ABX vs AMA discharge.       Eirk Aguillon, [address] [place] [address], [facility]  [phone]/Available on Perfect Serve

## 2023-09-08 NOTE — CARE COORDINATION
Transition of Care Plan:     RUR: 11%  Disposition: [address] vs [facility] transfer   If SNF or IPR: Date FOC offered:   Date FOC received:  Date authorization started with reference number:  Date authorization received and expires:  Accepting facility:  Follow up appointments: defer to facility   DME needed:  Transportation at [address]: Connie Ball (Parent)   [phone]  Discharge Caregiver contacted prior to discharge? If Pt desires   Care Conference needed?:  no                   Email sent today to [facility] at [address] asking for updates on potential transfer. Awaiting updates.     Frederick Ramirez, [facility], [facility]  [phone]/Available on Perfect Serve

## 2023-09-08 NOTE — CARE COORDINATION
Transition of Care Plan:     RUR: 11%  Disposition: [facility] vs.[facility] transfer   If SNF or IPR: Date FOC offered:   Date FOC received:  Date authorization started with reference number:  Date authorization received and expires:  Accepting facility:  Follow up appointments: defer to facility   DME needed:  Transportation at [address]: Joslyn Case (Parent)   [phone]  Discharge Caregiver contacted prior to discharge? If Pt desires   Care Conference needed?:  no           H unable to accept as they do not accept Medicaid for Skilled care. CM was encouraged to reach out to the [facility] center at [facility] as they may be able to accommodate Pt. CM was transferred to the [facility] center: [phone]. CM left a message for [employer].         Jazmyne Gutierrez CM [employer]

## 2023-09-09 LAB
ANION GAP SERPL CALC-SCNC: 2 MMOL/L (ref 5–15)
BACTERIA SPEC CULT: NORMAL
BACTERIA SPEC CULT: NORMAL
BUN SERPL-MCNC: 8 MG/DL (ref 6–20)
BUN/CREAT SERPL: 9 (ref 12–20)
CALCIUM SERPL-MCNC: 9.3 MG/DL (ref 8.5–10.1)
CHLORIDE SERPL-SCNC: 107 MMOL/L (ref 97–108)
CO2 SERPL-SCNC: 25 MMOL/L (ref 21–32)
CREAT SERPL-MCNC: 0.91 MG/DL (ref 0.55–1.02)
GLUCOSE SERPL-MCNC: 99 MG/DL (ref 65–100)
POTASSIUM SERPL-SCNC: 5.7 MMOL/L (ref 3.5–5.1)
SERVICE CMNT-IMP: NORMAL
SERVICE CMNT-IMP: NORMAL
SODIUM SERPL-SCNC: 134 MMOL/L (ref 136–145)

## 2023-09-09 PROCEDURE — 6370000000 HC RX 637 (ALT 250 FOR IP): Performed by: INTERNAL MEDICINE

## 2023-09-09 PROCEDURE — 76937 US GUIDE VASCULAR ACCESS: CPT

## 2023-09-09 PROCEDURE — A4216 STERILE WATER/SALINE, 10 ML: HCPCS | Performed by: NURSE PRACTITIONER

## 2023-09-09 PROCEDURE — 2500000003 HC RX 250 WO HCPCS: Performed by: INTERNAL MEDICINE

## 2023-09-09 PROCEDURE — 6370000000 HC RX 637 (ALT 250 FOR IP): Performed by: STUDENT IN AN ORGANIZED HEALTH CARE EDUCATION/TRAINING PROGRAM

## 2023-09-09 PROCEDURE — 6360000002 HC RX W HCPCS: Performed by: INTERNAL MEDICINE

## 2023-09-09 PROCEDURE — 6360000002 HC RX W HCPCS: Performed by: NURSE PRACTITIONER

## 2023-09-09 PROCEDURE — 2580000003 HC RX 258: Performed by: NURSE PRACTITIONER

## 2023-09-09 PROCEDURE — 1100000000 HC RM PRIVATE

## 2023-09-09 PROCEDURE — 36569 INSJ PICC 5 YR+ W/O IMAGING: CPT

## 2023-09-09 PROCEDURE — 6370000000 HC RX 637 (ALT 250 FOR IP): Performed by: NURSE PRACTITIONER

## 2023-09-09 PROCEDURE — C1751 CATH, INF, PER/CENT/MIDLINE: HCPCS

## 2023-09-09 PROCEDURE — 6360000002 HC RX W HCPCS: Performed by: STUDENT IN AN ORGANIZED HEALTH CARE EDUCATION/TRAINING PROGRAM

## 2023-09-09 PROCEDURE — 36415 COLL VENOUS BLD VENIPUNCTURE: CPT

## 2023-09-09 PROCEDURE — C9113 INJ PANTOPRAZOLE SODIUM, VIA: HCPCS | Performed by: NURSE PRACTITIONER

## 2023-09-09 PROCEDURE — 2580000003 HC RX 258: Performed by: INTERNAL MEDICINE

## 2023-09-09 PROCEDURE — 2580000003 HC RX 258: Performed by: STUDENT IN AN ORGANIZED HEALTH CARE EDUCATION/TRAINING PROGRAM

## 2023-09-09 PROCEDURE — 80048 BASIC METABOLIC PNL TOTAL CA: CPT

## 2023-09-09 RX ORDER — PANTOPRAZOLE SODIUM 40 MG/1
40 TABLET, DELAYED RELEASE ORAL
Status: DISCONTINUED | OUTPATIENT
Start: 2023-09-10 | End: 2023-09-13 | Stop reason: HOSPADM

## 2023-09-09 RX ADMIN — OXYCODONE HYDROCHLORIDE 10 MG: 5 TABLET ORAL at 15:33

## 2023-09-09 RX ADMIN — ENOXAPARIN SODIUM 40 MG: 100 INJECTION SUBCUTANEOUS at 09:45

## 2023-09-09 RX ADMIN — ONDANSETRON 4 MG: 2 INJECTION INTRAMUSCULAR; INTRAVENOUS at 13:40

## 2023-09-09 RX ADMIN — SENNOSIDES AND DOCUSATE SODIUM 2 TABLET: 50; 8.6 TABLET ORAL at 09:44

## 2023-09-09 RX ADMIN — OXYCODONE HYDROCHLORIDE 10 MG: 5 TABLET ORAL at 21:45

## 2023-09-09 RX ADMIN — HYDROMORPHONE HYDROCHLORIDE 0.5 MG: 1 INJECTION, SOLUTION INTRAMUSCULAR; INTRAVENOUS; SUBCUTANEOUS at 09:42

## 2023-09-09 RX ADMIN — HYDROMORPHONE HYDROCHLORIDE 0.5 MG: 1 INJECTION, SOLUTION INTRAMUSCULAR; INTRAVENOUS; SUBCUTANEOUS at 00:52

## 2023-09-09 RX ADMIN — SODIUM CHLORIDE, PRESERVATIVE FREE 10 ML: 5 INJECTION INTRAVENOUS at 10:59

## 2023-09-09 RX ADMIN — GABAPENTIN 1200 MG: 300 CAPSULE ORAL at 13:40

## 2023-09-09 RX ADMIN — CEFEPIME 2000 MG: 2 INJECTION, POWDER, FOR SOLUTION INTRAVENOUS at 21:55

## 2023-09-09 RX ADMIN — OXYCODONE HYDROCHLORIDE 10 MG: 5 TABLET ORAL at 06:25

## 2023-09-09 RX ADMIN — OXYCODONE HYDROCHLORIDE 10 MG: 5 TABLET ORAL at 02:20

## 2023-09-09 RX ADMIN — SODIUM CHLORIDE, PRESERVATIVE FREE 10 ML: 5 INJECTION INTRAVENOUS at 21:48

## 2023-09-09 RX ADMIN — CEFEPIME 2000 MG: 2 INJECTION, POWDER, FOR SOLUTION INTRAVENOUS at 11:04

## 2023-09-09 RX ADMIN — VANCOMYCIN HYDROCHLORIDE 1000 MG: 1 INJECTION, POWDER, LYOPHILIZED, FOR SOLUTION INTRAVENOUS at 11:00

## 2023-09-09 RX ADMIN — GABAPENTIN 1200 MG: 300 CAPSULE ORAL at 21:45

## 2023-09-09 RX ADMIN — HYDROMORPHONE HYDROCHLORIDE 0.5 MG: 1 INJECTION, SOLUTION INTRAMUSCULAR; INTRAVENOUS; SUBCUTANEOUS at 13:45

## 2023-09-09 RX ADMIN — OXYCODONE HYDROCHLORIDE 10 MG: 5 TABLET ORAL at 10:56

## 2023-09-09 RX ADMIN — GABAPENTIN 1200 MG: 300 CAPSULE ORAL at 09:44

## 2023-09-09 RX ADMIN — HYDROMORPHONE HYDROCHLORIDE 0.5 MG: 1 INJECTION, SOLUTION INTRAMUSCULAR; INTRAVENOUS; SUBCUTANEOUS at 22:15

## 2023-09-09 RX ADMIN — Medication 1 CAPSULE: at 09:44

## 2023-09-09 RX ADMIN — SODIUM CHLORIDE, PRESERVATIVE FREE 40 MG: 5 INJECTION INTRAVENOUS at 09:45

## 2023-09-09 RX ADMIN — HYDROMORPHONE HYDROCHLORIDE 0.5 MG: 1 INJECTION, SOLUTION INTRAMUSCULAR; INTRAVENOUS; SUBCUTANEOUS at 18:05

## 2023-09-09 ASSESSMENT — PAIN DESCRIPTION - ORIENTATION
ORIENTATION: LOWER

## 2023-09-09 ASSESSMENT — PAIN SCALES - GENERAL
PAINLEVEL_OUTOF10: 10
PAINLEVEL_OUTOF10: 9
PAINLEVEL_OUTOF10: 8
PAINLEVEL_OUTOF10: 8
PAINLEVEL_OUTOF10: 10
PAINLEVEL_OUTOF10: 10
PAINLEVEL_OUTOF10: 8
PAINLEVEL_OUTOF10: 6
PAINLEVEL_OUTOF10: 10
PAINLEVEL_OUTOF10: 8
PAINLEVEL_OUTOF10: 8
PAINLEVEL_OUTOF10: 9
PAINLEVEL_OUTOF10: 8
PAINLEVEL_OUTOF10: 8
PAINLEVEL_OUTOF10: 10
PAINLEVEL_OUTOF10: 5

## 2023-09-09 ASSESSMENT — PAIN DESCRIPTION - DESCRIPTORS
DESCRIPTORS: ACHING
DESCRIPTORS: ACHING;DISCOMFORT
DESCRIPTORS: ACHING
DESCRIPTORS: ACHING;DISCOMFORT
DESCRIPTORS: ACHING

## 2023-09-09 ASSESSMENT — PAIN DESCRIPTION - LOCATION
LOCATION: BACK

## 2023-09-09 NOTE — PROCEDURES
Peripherally Inserted Central Catheter (PICC) Procedure Note    INDICATIONS: Long term antibiotics: Vancomycin and cefepime until 11/01/2023    CONSENT:  The procedure, risks, benefits and alternatives were discussed with the patient. All questions were answered, and informed written consent obtained from the  patient. Informed consent and Procedure Boarding Pass were completed. PROCEDURE DETAILS:   Ultrasound was used to confirm patency of the right basilic prior to obtaining venous access. The area to be punctured was cleansed with chlorhexidine 2% for more than 30 seconds, allowed to dry and the site was draped using maximum sterile barrier precautions. Selected vein was once again confirmed with ultrasound and Lidocaine 1% injected followed by puncture of the right basilic with a 21 gauge single wall needle under direct sonographic guidance. Guidewire was advanced and the needle was removed and peel-away sheath was placed. The catheter was then trimmed and advanced through the peel-away sheath using  electronic navigation. The sheath was, brisk blood return confirmed, the catheter was flushed with normal saline and capped. Maximum sterile barrier was used: cap, mask, sterile gloves, sterile gown, and body drape. All guide wires removed with tip intact by visual inspection. The catheter was stabilized on the skin using a securement device. Antimicrobial impregnated transparent dressing applied using aseptic technique. Patientwell with no complications    PICC:  Catheter Type: 4 fr single lumen Power PICC  Insertion Site (vein) :right basilic  Total Length: 36  cm  External Length:0 cm  UAC: 39 cm  PICC occupies 15% of the right basilic vein    PICC KIT:  Reference #:[id number]  Lot #: [id number]  Expiration date: 05-    FINDINGS/CONCLUSIONS:  No signs of bleeding or symptoms of nerve irritation noted.   Final PICC tip location was

## 2023-09-10 LAB
ANION GAP SERPL CALC-SCNC: 4 MMOL/L (ref 5–15)
BACTERIA ISLT: ABNORMAL
BUN SERPL-MCNC: 8 MG/DL (ref 6–20)
BUN/CREAT SERPL: 9 (ref 12–20)
CALCIUM SERPL-MCNC: 8.8 MG/DL (ref 8.5–10.1)
CHLORIDE SERPL-SCNC: 105 MMOL/L (ref 97–108)
CO2 SERPL-SCNC: 29 MMOL/L (ref 21–32)
CREAT SERPL-MCNC: 0.92 MG/DL (ref 0.55–1.02)
GLUCOSE SERPL-MCNC: 101 MG/DL (ref 65–100)
OTHER ANTIBIOTIC SUSC ISLT: ABNORMAL
POTASSIUM SERPL-SCNC: 4.2 MMOL/L (ref 3.5–5.1)
SODIUM SERPL-SCNC: 138 MMOL/L (ref 136–145)
SOURCE: ABNORMAL
SPECIMEN SOURCE: ABNORMAL

## 2023-09-10 PROCEDURE — 6370000000 HC RX 637 (ALT 250 FOR IP): Performed by: STUDENT IN AN ORGANIZED HEALTH CARE EDUCATION/TRAINING PROGRAM

## 2023-09-10 PROCEDURE — 2500000003 HC RX 250 WO HCPCS: Performed by: INTERNAL MEDICINE

## 2023-09-10 PROCEDURE — 80048 BASIC METABOLIC PNL TOTAL CA: CPT

## 2023-09-10 PROCEDURE — 36415 COLL VENOUS BLD VENIPUNCTURE: CPT

## 2023-09-10 PROCEDURE — 1100000000 HC RM PRIVATE

## 2023-09-10 PROCEDURE — 6370000000 HC RX 637 (ALT 250 FOR IP): Performed by: INTERNAL MEDICINE

## 2023-09-10 PROCEDURE — 2580000003 HC RX 258: Performed by: INTERNAL MEDICINE

## 2023-09-10 PROCEDURE — 6360000002 HC RX W HCPCS: Performed by: INTERNAL MEDICINE

## 2023-09-10 PROCEDURE — 6360000002 HC RX W HCPCS: Performed by: STUDENT IN AN ORGANIZED HEALTH CARE EDUCATION/TRAINING PROGRAM

## 2023-09-10 PROCEDURE — 6370000000 HC RX 637 (ALT 250 FOR IP): Performed by: NURSE PRACTITIONER

## 2023-09-10 PROCEDURE — 2580000003 HC RX 258: Performed by: STUDENT IN AN ORGANIZED HEALTH CARE EDUCATION/TRAINING PROGRAM

## 2023-09-10 RX ADMIN — GABAPENTIN 1200 MG: 300 CAPSULE ORAL at 08:16

## 2023-09-10 RX ADMIN — Medication 1 CAPSULE: at 08:16

## 2023-09-10 RX ADMIN — VANCOMYCIN HYDROCHLORIDE 1000 MG: 1 INJECTION, POWDER, LYOPHILIZED, FOR SOLUTION INTRAVENOUS at 09:47

## 2023-09-10 RX ADMIN — SODIUM CHLORIDE, PRESERVATIVE FREE 10 ML: 5 INJECTION INTRAVENOUS at 08:17

## 2023-09-10 RX ADMIN — HYDROMORPHONE HYDROCHLORIDE 0.5 MG: 1 INJECTION, SOLUTION INTRAMUSCULAR; INTRAVENOUS; SUBCUTANEOUS at 08:20

## 2023-09-10 RX ADMIN — HYDROMORPHONE HYDROCHLORIDE 0.5 MG: 1 INJECTION, SOLUTION INTRAMUSCULAR; INTRAVENOUS; SUBCUTANEOUS at 12:30

## 2023-09-10 RX ADMIN — CEFEPIME 2000 MG: 2 INJECTION, POWDER, FOR SOLUTION INTRAVENOUS at 10:59

## 2023-09-10 RX ADMIN — VANCOMYCIN HYDROCHLORIDE 1000 MG: 1 INJECTION, POWDER, LYOPHILIZED, FOR SOLUTION INTRAVENOUS at 23:20

## 2023-09-10 RX ADMIN — SODIUM CHLORIDE, PRESERVATIVE FREE 10 ML: 5 INJECTION INTRAVENOUS at 20:49

## 2023-09-10 RX ADMIN — OXYCODONE HYDROCHLORIDE 10 MG: 5 TABLET ORAL at 14:06

## 2023-09-10 RX ADMIN — ENOXAPARIN SODIUM 40 MG: 100 INJECTION SUBCUTANEOUS at 08:15

## 2023-09-10 RX ADMIN — SENNOSIDES AND DOCUSATE SODIUM 2 TABLET: 50; 8.6 TABLET ORAL at 08:16

## 2023-09-10 RX ADMIN — VANCOMYCIN HYDROCHLORIDE 1000 MG: 1 INJECTION, POWDER, LYOPHILIZED, FOR SOLUTION INTRAVENOUS at 02:13

## 2023-09-10 RX ADMIN — OXYCODONE HYDROCHLORIDE 10 MG: 5 TABLET ORAL at 22:30

## 2023-09-10 RX ADMIN — GABAPENTIN 1200 MG: 300 CAPSULE ORAL at 14:07

## 2023-09-10 RX ADMIN — CEFEPIME 2000 MG: 2 INJECTION, POWDER, FOR SOLUTION INTRAVENOUS at 03:24

## 2023-09-10 RX ADMIN — IBUPROFEN 200 MG: 400 TABLET, FILM COATED ORAL at 23:32

## 2023-09-10 RX ADMIN — OXYCODONE HYDROCHLORIDE 10 MG: 5 TABLET ORAL at 10:59

## 2023-09-10 RX ADMIN — HYDROMORPHONE HYDROCHLORIDE 0.5 MG: 1 INJECTION, SOLUTION INTRAMUSCULAR; INTRAVENOUS; SUBCUTANEOUS at 02:34

## 2023-09-10 RX ADMIN — OXYCODONE HYDROCHLORIDE 10 MG: 5 TABLET ORAL at 17:57

## 2023-09-10 RX ADMIN — PANTOPRAZOLE SODIUM 40 MG: 40 TABLET, DELAYED RELEASE ORAL at 08:16

## 2023-09-10 RX ADMIN — GABAPENTIN 1200 MG: 300 CAPSULE ORAL at 20:42

## 2023-09-10 RX ADMIN — HYDROMORPHONE HYDROCHLORIDE 0.5 MG: 1 INJECTION, SOLUTION INTRAMUSCULAR; INTRAVENOUS; SUBCUTANEOUS at 17:02

## 2023-09-10 RX ADMIN — OXYCODONE HYDROCHLORIDE 10 MG: 5 TABLET ORAL at 06:27

## 2023-09-10 RX ADMIN — CEFEPIME 2000 MG: 2 INJECTION, POWDER, FOR SOLUTION INTRAVENOUS at 20:01

## 2023-09-10 RX ADMIN — OXYCODONE HYDROCHLORIDE 10 MG: 5 TABLET ORAL at 02:04

## 2023-09-10 RX ADMIN — SODIUM CHLORIDE, PRESERVATIVE FREE 10 ML: 5 INJECTION INTRAVENOUS at 22:31

## 2023-09-10 RX ADMIN — HYDROMORPHONE HYDROCHLORIDE 0.5 MG: 1 INJECTION, SOLUTION INTRAMUSCULAR; INTRAVENOUS; SUBCUTANEOUS at 20:41

## 2023-09-10 ASSESSMENT — PAIN DESCRIPTION - ONSET
ONSET: ON-GOING

## 2023-09-10 ASSESSMENT — PAIN DESCRIPTION - PAIN TYPE
TYPE: CHRONIC PAIN

## 2023-09-10 ASSESSMENT — PAIN SCALES - GENERAL
PAINLEVEL_OUTOF10: 10
PAINLEVEL_OUTOF10: 9
PAINLEVEL_OUTOF10: 8
PAINLEVEL_OUTOF10: 7
PAINLEVEL_OUTOF10: 8
PAINLEVEL_OUTOF10: 8
PAINLEVEL_OUTOF10: 10
PAINLEVEL_OUTOF10: 9
PAINLEVEL_OUTOF10: 8
PAINLEVEL_OUTOF10: 8
PAINLEVEL_OUTOF10: 9
PAINLEVEL_OUTOF10: 10
PAINLEVEL_OUTOF10: 8
PAINLEVEL_OUTOF10: 10
PAINLEVEL_OUTOF10: 9
PAINLEVEL_OUTOF10: 10

## 2023-09-10 ASSESSMENT — PAIN DESCRIPTION - DESCRIPTORS
DESCRIPTORS: ACHING
DESCRIPTORS: ACHING;SORE
DESCRIPTORS: ACHING
DESCRIPTORS: ACHING;SORE
DESCRIPTORS: ACHING;SORE
DESCRIPTORS: ACHING

## 2023-09-10 ASSESSMENT — PAIN DESCRIPTION - ORIENTATION
ORIENTATION: LOWER
ORIENTATION: LOWER;MID
ORIENTATION: LOWER
ORIENTATION: LOWER
ORIENTATION: MID;LOWER
ORIENTATION: LOWER
ORIENTATION: MID;LOWER
ORIENTATION: LOWER
ORIENTATION: LOWER
ORIENTATION: INNER;MID

## 2023-09-10 ASSESSMENT — PAIN DESCRIPTION - LOCATION
LOCATION: BACK

## 2023-09-10 ASSESSMENT — PAIN DESCRIPTION - FREQUENCY
FREQUENCY: CONTINUOUS

## 2023-09-11 LAB
ANION GAP SERPL CALC-SCNC: 5 MMOL/L (ref 5–15)
APPEARANCE UR: CLEAR
BACTERIA URNS QL MICRO: NEGATIVE /HPF
BILIRUB UR QL: NEGATIVE
BUN SERPL-MCNC: 13 MG/DL (ref 6–20)
BUN/CREAT SERPL: 14 (ref 12–20)
CALCIUM SERPL-MCNC: 9 MG/DL (ref 8.5–10.1)
CHLORIDE SERPL-SCNC: 103 MMOL/L (ref 97–108)
CO2 SERPL-SCNC: 28 MMOL/L (ref 21–32)
COLOR UR: NORMAL
COMMENT:: NORMAL
CREAT SERPL-MCNC: 0.96 MG/DL (ref 0.55–1.02)
EPITH CASTS URNS QL MICRO: NORMAL /LPF
GLUCOSE SERPL-MCNC: 113 MG/DL (ref 65–100)
GLUCOSE UR STRIP.AUTO-MCNC: NEGATIVE MG/DL
HGB UR QL STRIP: NEGATIVE
HYALINE CASTS URNS QL MICRO: NORMAL /LPF (ref 0–2)
KETONES UR QL STRIP.AUTO: NEGATIVE MG/DL
LEUKOCYTE ESTERASE UR QL STRIP.AUTO: NEGATIVE
NITRITE UR QL STRIP.AUTO: NEGATIVE
PH UR STRIP: 6.5 (ref 5–8)
POTASSIUM SERPL-SCNC: 4 MMOL/L (ref 3.5–5.1)
PROT UR STRIP-MCNC: NEGATIVE MG/DL
RBC #/AREA URNS HPF: NORMAL /HPF (ref 0–5)
SODIUM SERPL-SCNC: 136 MMOL/L (ref 136–145)
SP GR UR REFRACTOMETRY: 1
SPECIMEN HOLD: NORMAL
URINE CULTURE IF INDICATED: NORMAL
UROBILINOGEN UR QL STRIP.AUTO: 0.2 EU/DL (ref 0.2–1)
VANCOMYCIN SERPL-MCNC: 23.5 UG/ML
WBC URNS QL MICRO: NORMAL /HPF (ref 0–4)

## 2023-09-11 PROCEDURE — 97535 SELF CARE MNGMENT TRAINING: CPT

## 2023-09-11 PROCEDURE — 2580000003 HC RX 258: Performed by: INTERNAL MEDICINE

## 2023-09-11 PROCEDURE — 81001 URINALYSIS AUTO W/SCOPE: CPT

## 2023-09-11 PROCEDURE — 6370000000 HC RX 637 (ALT 250 FOR IP): Performed by: INTERNAL MEDICINE

## 2023-09-11 PROCEDURE — 6360000002 HC RX W HCPCS: Performed by: INTERNAL MEDICINE

## 2023-09-11 PROCEDURE — 97530 THERAPEUTIC ACTIVITIES: CPT

## 2023-09-11 PROCEDURE — 2580000003 HC RX 258: Performed by: STUDENT IN AN ORGANIZED HEALTH CARE EDUCATION/TRAINING PROGRAM

## 2023-09-11 PROCEDURE — 99232 SBSQ HOSP IP/OBS MODERATE 35: CPT | Performed by: INTERNAL MEDICINE

## 2023-09-11 PROCEDURE — 2500000003 HC RX 250 WO HCPCS: Performed by: INTERNAL MEDICINE

## 2023-09-11 PROCEDURE — 80048 BASIC METABOLIC PNL TOTAL CA: CPT

## 2023-09-11 PROCEDURE — 6370000000 HC RX 637 (ALT 250 FOR IP): Performed by: NURSE PRACTITIONER

## 2023-09-11 PROCEDURE — 6370000000 HC RX 637 (ALT 250 FOR IP): Performed by: STUDENT IN AN ORGANIZED HEALTH CARE EDUCATION/TRAINING PROGRAM

## 2023-09-11 PROCEDURE — 1100000000 HC RM PRIVATE

## 2023-09-11 PROCEDURE — 6360000002 HC RX W HCPCS: Performed by: STUDENT IN AN ORGANIZED HEALTH CARE EDUCATION/TRAINING PROGRAM

## 2023-09-11 PROCEDURE — 6360000002 HC RX W HCPCS: Performed by: NURSE PRACTITIONER

## 2023-09-11 PROCEDURE — 97116 GAIT TRAINING THERAPY: CPT

## 2023-09-11 PROCEDURE — 80202 ASSAY OF VANCOMYCIN: CPT

## 2023-09-11 PROCEDURE — 36415 COLL VENOUS BLD VENIPUNCTURE: CPT

## 2023-09-11 RX ORDER — KETOROLAC TROMETHAMINE 30 MG/ML
30 INJECTION, SOLUTION INTRAMUSCULAR; INTRAVENOUS ONCE
Status: COMPLETED | OUTPATIENT
Start: 2023-09-11 | End: 2023-09-11

## 2023-09-11 RX ADMIN — Medication 1 CAPSULE: at 08:58

## 2023-09-11 RX ADMIN — ONDANSETRON 4 MG: 2 INJECTION INTRAMUSCULAR; INTRAVENOUS at 02:53

## 2023-09-11 RX ADMIN — ENOXAPARIN SODIUM 40 MG: 100 INJECTION SUBCUTANEOUS at 08:59

## 2023-09-11 RX ADMIN — HYDROMORPHONE HYDROCHLORIDE 0.5 MG: 1 INJECTION, SOLUTION INTRAMUSCULAR; INTRAVENOUS; SUBCUTANEOUS at 18:30

## 2023-09-11 RX ADMIN — GABAPENTIN 1200 MG: 300 CAPSULE ORAL at 08:58

## 2023-09-11 RX ADMIN — HYDROMORPHONE HYDROCHLORIDE 0.5 MG: 1 INJECTION, SOLUTION INTRAMUSCULAR; INTRAVENOUS; SUBCUTANEOUS at 13:27

## 2023-09-11 RX ADMIN — HYDROMORPHONE HYDROCHLORIDE 0.5 MG: 1 INJECTION, SOLUTION INTRAMUSCULAR; INTRAVENOUS; SUBCUTANEOUS at 04:51

## 2023-09-11 RX ADMIN — SODIUM CHLORIDE, PRESERVATIVE FREE 10 ML: 5 INJECTION INTRAVENOUS at 21:17

## 2023-09-11 RX ADMIN — SODIUM CHLORIDE, PRESERVATIVE FREE 10 ML: 5 INJECTION INTRAVENOUS at 09:05

## 2023-09-11 RX ADMIN — VANCOMYCIN HYDROCHLORIDE 1000 MG: 1 INJECTION, POWDER, LYOPHILIZED, FOR SOLUTION INTRAVENOUS at 10:34

## 2023-09-11 RX ADMIN — OXYCODONE HYDROCHLORIDE 10 MG: 5 TABLET ORAL at 02:42

## 2023-09-11 RX ADMIN — KETOROLAC TROMETHAMINE 30 MG: 30 INJECTION, SOLUTION INTRAMUSCULAR; INTRAVENOUS at 01:26

## 2023-09-11 RX ADMIN — HYDROMORPHONE HYDROCHLORIDE 0.5 MG: 1 INJECTION, SOLUTION INTRAMUSCULAR; INTRAVENOUS; SUBCUTANEOUS at 23:18

## 2023-09-11 RX ADMIN — CEFEPIME 2000 MG: 2 INJECTION, POWDER, FOR SOLUTION INTRAVENOUS at 21:12

## 2023-09-11 RX ADMIN — GABAPENTIN 1200 MG: 300 CAPSULE ORAL at 13:30

## 2023-09-11 RX ADMIN — HYDROMORPHONE HYDROCHLORIDE 0.5 MG: 1 INJECTION, SOLUTION INTRAMUSCULAR; INTRAVENOUS; SUBCUTANEOUS at 08:56

## 2023-09-11 RX ADMIN — PANTOPRAZOLE SODIUM 40 MG: 40 TABLET, DELAYED RELEASE ORAL at 06:36

## 2023-09-11 RX ADMIN — OXYCODONE HYDROCHLORIDE 10 MG: 5 TABLET ORAL at 15:36

## 2023-09-11 RX ADMIN — OXYCODONE HYDROCHLORIDE 10 MG: 5 TABLET ORAL at 06:37

## 2023-09-11 RX ADMIN — GABAPENTIN 1200 MG: 300 CAPSULE ORAL at 21:08

## 2023-09-11 RX ADMIN — OXYCODONE HYDROCHLORIDE 10 MG: 5 TABLET ORAL at 10:33

## 2023-09-11 RX ADMIN — HYDROMORPHONE HYDROCHLORIDE 0.5 MG: 1 INJECTION, SOLUTION INTRAMUSCULAR; INTRAVENOUS; SUBCUTANEOUS at 01:03

## 2023-09-11 RX ADMIN — OXYCODONE HYDROCHLORIDE 10 MG: 5 TABLET ORAL at 21:09

## 2023-09-11 RX ADMIN — SENNOSIDES AND DOCUSATE SODIUM 2 TABLET: 50; 8.6 TABLET ORAL at 08:58

## 2023-09-11 RX ADMIN — CEFEPIME 2000 MG: 2 INJECTION, POWDER, FOR SOLUTION INTRAVENOUS at 02:43

## 2023-09-11 RX ADMIN — CEFEPIME 2000 MG: 2 INJECTION, POWDER, FOR SOLUTION INTRAVENOUS at 11:15

## 2023-09-11 ASSESSMENT — PAIN SCALES - GENERAL
PAINLEVEL_OUTOF10: 9
PAINLEVEL_OUTOF10: 8
PAINLEVEL_OUTOF10: 9
PAINLEVEL_OUTOF10: 10
PAINLEVEL_OUTOF10: 8
PAINLEVEL_OUTOF10: 10
PAINLEVEL_OUTOF10: 9

## 2023-09-11 ASSESSMENT — PAIN DESCRIPTION - ORIENTATION
ORIENTATION: LOWER;POSTERIOR
ORIENTATION: LOWER
ORIENTATION: LOWER
ORIENTATION: MID
ORIENTATION: LOWER;POSTERIOR
ORIENTATION: LOWER
ORIENTATION: LOWER
ORIENTATION: MID
ORIENTATION: LOWER

## 2023-09-11 ASSESSMENT — PAIN DESCRIPTION - LOCATION
LOCATION: BACK

## 2023-09-11 ASSESSMENT — PAIN DESCRIPTION - DESCRIPTORS
DESCRIPTORS: ACHING

## 2023-09-11 ASSESSMENT — PAIN DESCRIPTION - PAIN TYPE: TYPE: CHRONIC PAIN

## 2023-09-11 ASSESSMENT — PAIN DESCRIPTION - ONSET: ONSET: ON-GOING

## 2023-09-11 NOTE — CARE COORDINATION
CM: Kassandra Najera is currently working with pt in the [address] Unit. CM staffed case with physician. CM was asked to send referral to [facility], for placement. Pt will require long term IV abx until Oct 15th. CM sent email and escalated concerns to CM team.  CM will continue to follow.     EARLE Mi CM  [phone]

## 2023-09-11 NOTE — CARE COORDINATION
Transition of Care Plan:     RUR: 11%  Disposition: Pending review for [facility] transfer   If SNF or IPR: Date FOC offered:   Date FOC received:  Date authorization started with reference number:  Date authorization received and expires:  Accepting facility:  Follow up appointments: defer to facility   DME needed:  Transportation at [address]: Lawrence Bernardo (Parent)   [phone]  Discharge Caregiver contacted prior to discharge? If Pt desires   Care Conference needed?:  no            CM received a phone call from [address] [place] [address]. CM spoke with Josie Negro. Josie Negro stated that facility would not be able to accommodate Pt at this time as they prefer to place a line at least a week at a time for IV ABX. [facility] is the only other option for the Pt at this time.      Jazmyne Alba, SEVEN Ashraf

## 2023-09-12 LAB
ANION GAP SERPL CALC-SCNC: 5 MMOL/L (ref 5–15)
BUN SERPL-MCNC: 13 MG/DL (ref 6–20)
BUN/CREAT SERPL: 11 (ref 12–20)
CALCIUM SERPL-MCNC: 9 MG/DL (ref 8.5–10.1)
CHLORIDE SERPL-SCNC: 106 MMOL/L (ref 97–108)
CO2 SERPL-SCNC: 26 MMOL/L (ref 21–32)
CREAT SERPL-MCNC: 1.16 MG/DL (ref 0.55–1.02)
GLUCOSE SERPL-MCNC: 99 MG/DL (ref 65–100)
POTASSIUM SERPL-SCNC: 4.1 MMOL/L (ref 3.5–5.1)
SODIUM SERPL-SCNC: 137 MMOL/L (ref 136–145)

## 2023-09-12 PROCEDURE — 36415 COLL VENOUS BLD VENIPUNCTURE: CPT

## 2023-09-12 PROCEDURE — 6360000002 HC RX W HCPCS: Performed by: STUDENT IN AN ORGANIZED HEALTH CARE EDUCATION/TRAINING PROGRAM

## 2023-09-12 PROCEDURE — 2580000003 HC RX 258: Performed by: INTERNAL MEDICINE

## 2023-09-12 PROCEDURE — 80048 BASIC METABOLIC PNL TOTAL CA: CPT

## 2023-09-12 PROCEDURE — 1100000000 HC RM PRIVATE

## 2023-09-12 PROCEDURE — 2500000003 HC RX 250 WO HCPCS: Performed by: INTERNAL MEDICINE

## 2023-09-12 PROCEDURE — 6370000000 HC RX 637 (ALT 250 FOR IP): Performed by: NURSE PRACTITIONER

## 2023-09-12 PROCEDURE — 2580000003 HC RX 258: Performed by: STUDENT IN AN ORGANIZED HEALTH CARE EDUCATION/TRAINING PROGRAM

## 2023-09-12 PROCEDURE — 6360000002 HC RX W HCPCS: Performed by: INTERNAL MEDICINE

## 2023-09-12 PROCEDURE — 6370000000 HC RX 637 (ALT 250 FOR IP): Performed by: INTERNAL MEDICINE

## 2023-09-12 PROCEDURE — 6370000000 HC RX 637 (ALT 250 FOR IP): Performed by: STUDENT IN AN ORGANIZED HEALTH CARE EDUCATION/TRAINING PROGRAM

## 2023-09-12 PROCEDURE — 97535 SELF CARE MNGMENT TRAINING: CPT | Performed by: OCCUPATIONAL THERAPIST

## 2023-09-12 RX ADMIN — Medication 1 CAPSULE: at 08:42

## 2023-09-12 RX ADMIN — OXYCODONE HYDROCHLORIDE 10 MG: 5 TABLET ORAL at 20:40

## 2023-09-12 RX ADMIN — OXYCODONE HYDROCHLORIDE 10 MG: 5 TABLET ORAL at 01:36

## 2023-09-12 RX ADMIN — OXYCODONE HYDROCHLORIDE 10 MG: 5 TABLET ORAL at 16:30

## 2023-09-12 RX ADMIN — OXYCODONE HYDROCHLORIDE 10 MG: 5 TABLET ORAL at 05:43

## 2023-09-12 RX ADMIN — SENNOSIDES AND DOCUSATE SODIUM 2 TABLET: 50; 8.6 TABLET ORAL at 08:42

## 2023-09-12 RX ADMIN — HYDROMORPHONE HYDROCHLORIDE 0.5 MG: 1 INJECTION, SOLUTION INTRAMUSCULAR; INTRAVENOUS; SUBCUTANEOUS at 14:12

## 2023-09-12 RX ADMIN — CEFEPIME 2000 MG: 2 INJECTION, POWDER, FOR SOLUTION INTRAVENOUS at 14:11

## 2023-09-12 RX ADMIN — GABAPENTIN 1200 MG: 300 CAPSULE ORAL at 08:42

## 2023-09-12 RX ADMIN — HYDROMORPHONE HYDROCHLORIDE 0.5 MG: 1 INJECTION, SOLUTION INTRAMUSCULAR; INTRAVENOUS; SUBCUTANEOUS at 08:43

## 2023-09-12 RX ADMIN — GABAPENTIN 1200 MG: 300 CAPSULE ORAL at 14:12

## 2023-09-12 RX ADMIN — GABAPENTIN 1200 MG: 300 CAPSULE ORAL at 22:19

## 2023-09-12 RX ADMIN — VANCOMYCIN HYDROCHLORIDE 1000 MG: 1 INJECTION, POWDER, LYOPHILIZED, FOR SOLUTION INTRAVENOUS at 22:14

## 2023-09-12 RX ADMIN — CEFEPIME 2000 MG: 2 INJECTION, POWDER, FOR SOLUTION INTRAVENOUS at 05:43

## 2023-09-12 RX ADMIN — ENOXAPARIN SODIUM 40 MG: 100 INJECTION SUBCUTANEOUS at 08:43

## 2023-09-12 RX ADMIN — OXYCODONE HYDROCHLORIDE 10 MG: 5 TABLET ORAL at 10:32

## 2023-09-12 RX ADMIN — VANCOMYCIN HYDROCHLORIDE 1000 MG: 1 INJECTION, POWDER, LYOPHILIZED, FOR SOLUTION INTRAVENOUS at 10:29

## 2023-09-12 RX ADMIN — HYDROMORPHONE HYDROCHLORIDE 0.5 MG: 1 INJECTION, SOLUTION INTRAMUSCULAR; INTRAVENOUS; SUBCUTANEOUS at 03:47

## 2023-09-12 RX ADMIN — PANTOPRAZOLE SODIUM 40 MG: 40 TABLET, DELAYED RELEASE ORAL at 08:42

## 2023-09-12 RX ADMIN — SODIUM CHLORIDE, PRESERVATIVE FREE 10 ML: 5 INJECTION INTRAVENOUS at 20:39

## 2023-09-12 RX ADMIN — SODIUM CHLORIDE, PRESERVATIVE FREE 10 ML: 5 INJECTION INTRAVENOUS at 08:44

## 2023-09-12 RX ADMIN — VANCOMYCIN HYDROCHLORIDE 1000 MG: 1 INJECTION, POWDER, LYOPHILIZED, FOR SOLUTION INTRAVENOUS at 02:09

## 2023-09-12 ASSESSMENT — PAIN DESCRIPTION - ORIENTATION
ORIENTATION: LOWER
ORIENTATION: LOWER
ORIENTATION: POSTERIOR

## 2023-09-12 ASSESSMENT — PAIN DESCRIPTION - LOCATION
LOCATION: BACK

## 2023-09-12 ASSESSMENT — PAIN DESCRIPTION - DESCRIPTORS
DESCRIPTORS: ACHING
DESCRIPTORS: ACHING;SHARP;THROBBING
DESCRIPTORS: ACHING
DESCRIPTORS: ACHING;CRAMPING;SORE;SHARP
DESCRIPTORS: ACHING;DISCOMFORT

## 2023-09-12 ASSESSMENT — PAIN SCALES - GENERAL
PAINLEVEL_OUTOF10: 8
PAINLEVEL_OUTOF10: 4
PAINLEVEL_OUTOF10: 8
PAINLEVEL_OUTOF10: 9
PAINLEVEL_OUTOF10: 9
PAINLEVEL_OUTOF10: 7
PAINLEVEL_OUTOF10: 9

## 2023-09-12 ASSESSMENT — PAIN DESCRIPTION - PAIN TYPE: TYPE: CHRONIC PAIN

## 2023-09-12 ASSESSMENT — PAIN DESCRIPTION - ONSET: ONSET: ON-GOING

## 2023-09-12 ASSESSMENT — PAIN DESCRIPTION - FREQUENCY: FREQUENCY: CONTINUOUS

## 2023-09-12 NOTE — CARE COORDINATION
Transition of Care Plan:    RUR: 10%  Prior Level of Functioning:  Independent with ADLs  Disposition: Placement at [facility]   Follow up appointments: Follow up with PCP and/or Specialist   DME needed: N/A  Transportation at discharge: BLS  IM/IMM Medicare/ letter given: N/A  Is patient a [place] and connected with VA? N/A   If yes, was [place] transfer form completed and VA notified? N/A  Caregiver Contact: Joslyn Manpreet (parent) [phone]  Discharge Caregiver contacted prior to discharge? Family to be contacted   Care Conference needed? Not at this time  Barriers to discharge: Placement      CM sent update email to [facility] CM team to review for placement. CM included [facility] CM team to assist with additional placement concerns. CM will continue to follow.     EARLE Aranda   [phone]

## 2023-09-13 ENCOUNTER — APPOINTMENT (OUTPATIENT)
Facility: HOSPITAL | Age: 43
DRG: 347 | End: 2023-09-13
Attending: STUDENT IN AN ORGANIZED HEALTH CARE EDUCATION/TRAINING PROGRAM
Payer: MEDICAID

## 2023-09-13 ENCOUNTER — HOSPITAL ENCOUNTER (INPATIENT)
Facility: HOSPITAL | Age: 43
LOS: 13 days | Discharge: LEFT BEFORE DISCHARGE | DRG: 347 | End: 2023-09-26
Attending: STUDENT IN AN ORGANIZED HEALTH CARE EDUCATION/TRAINING PROGRAM | Admitting: STUDENT IN AN ORGANIZED HEALTH CARE EDUCATION/TRAINING PROGRAM
Payer: MEDICAID

## 2023-09-13 VITALS
BODY MASS INDEX: 28.56 KG/M2 | DIASTOLIC BLOOD PRESSURE: 67 MMHG | OXYGEN SATURATION: 97 % | HEART RATE: 82 BPM | RESPIRATION RATE: 16 BRPM | HEIGHT: 67 IN | SYSTOLIC BLOOD PRESSURE: 100 MMHG | TEMPERATURE: 97.7 F | WEIGHT: 182 LBS

## 2023-09-13 PROCEDURE — 6360000002 HC RX W HCPCS: Performed by: STUDENT IN AN ORGANIZED HEALTH CARE EDUCATION/TRAINING PROGRAM

## 2023-09-13 PROCEDURE — 2580000003 HC RX 258: Performed by: STUDENT IN AN ORGANIZED HEALTH CARE EDUCATION/TRAINING PROGRAM

## 2023-09-13 PROCEDURE — 6370000000 HC RX 637 (ALT 250 FOR IP): Performed by: STUDENT IN AN ORGANIZED HEALTH CARE EDUCATION/TRAINING PROGRAM

## 2023-09-13 PROCEDURE — 6370000000 HC RX 637 (ALT 250 FOR IP): Performed by: INTERNAL MEDICINE

## 2023-09-13 PROCEDURE — 71045 X-RAY EXAM CHEST 1 VIEW: CPT

## 2023-09-13 PROCEDURE — 2580000003 HC RX 258: Performed by: INTERNAL MEDICINE

## 2023-09-13 PROCEDURE — 97110 THERAPEUTIC EXERCISES: CPT

## 2023-09-13 PROCEDURE — 6360000002 HC RX W HCPCS: Performed by: INTERNAL MEDICINE

## 2023-09-13 PROCEDURE — 2500000003 HC RX 250 WO HCPCS: Performed by: INTERNAL MEDICINE

## 2023-09-13 PROCEDURE — 6370000000 HC RX 637 (ALT 250 FOR IP): Performed by: NURSE PRACTITIONER

## 2023-09-13 PROCEDURE — 87040 BLOOD CULTURE FOR BACTERIA: CPT

## 2023-09-13 PROCEDURE — 1200000000 HC SEMI PRIVATE

## 2023-09-13 RX ORDER — ALUMINUM ZIRCONIUM OCTACHLOROHYDREX GLY 16 G/100G
1 GEL TOPICAL DAILY
Status: DISCONTINUED | OUTPATIENT
Start: 2023-09-14 | End: 2023-09-27 | Stop reason: HOSPADM

## 2023-09-13 RX ORDER — PANTOPRAZOLE SODIUM 40 MG/1
40 TABLET, DELAYED RELEASE ORAL
Status: DISCONTINUED | OUTPATIENT
Start: 2023-09-14 | End: 2023-09-27 | Stop reason: HOSPADM

## 2023-09-13 RX ORDER — IBUPROFEN 200 MG
200 TABLET ORAL EVERY 6 HOURS PRN
Status: DISCONTINUED | OUTPATIENT
Start: 2023-09-13 | End: 2023-09-14

## 2023-09-13 RX ORDER — GABAPENTIN 300 MG/1
1200 CAPSULE ORAL 3 TIMES DAILY
Status: DISCONTINUED | OUTPATIENT
Start: 2023-09-13 | End: 2023-09-27 | Stop reason: HOSPADM

## 2023-09-13 RX ORDER — ENOXAPARIN SODIUM 100 MG/ML
40 INJECTION SUBCUTANEOUS DAILY
Status: DISCONTINUED | OUTPATIENT
Start: 2023-09-14 | End: 2023-09-27 | Stop reason: HOSPADM

## 2023-09-13 RX ORDER — QUETIAPINE FUMARATE 50 MG/1
50 TABLET, EXTENDED RELEASE ORAL NIGHTLY
Status: DISCONTINUED | OUTPATIENT
Start: 2023-09-13 | End: 2023-09-25

## 2023-09-13 RX ORDER — ONDANSETRON 2 MG/ML
4 INJECTION INTRAMUSCULAR; INTRAVENOUS EVERY 6 HOURS PRN
Status: DISCONTINUED | OUTPATIENT
Start: 2023-09-13 | End: 2023-09-27 | Stop reason: HOSPADM

## 2023-09-13 RX ORDER — MAGNESIUM HYDROXIDE/ALUMINUM HYDROXICE/SIMETHICONE 120; 1200; 1200 MG/30ML; MG/30ML; MG/30ML
30 SUSPENSION ORAL EVERY 6 HOURS PRN
Status: DISCONTINUED | OUTPATIENT
Start: 2023-09-13 | End: 2023-09-27 | Stop reason: HOSPADM

## 2023-09-13 RX ORDER — LIDOCAINE 4 G/G
1 PATCH TOPICAL DAILY
Status: DISCONTINUED | OUTPATIENT
Start: 2023-09-14 | End: 2023-09-27 | Stop reason: HOSPADM

## 2023-09-13 RX ORDER — HYDROXYZINE HYDROCHLORIDE 25 MG/1
25 TABLET, FILM COATED ORAL 3 TIMES DAILY PRN
Status: DISCONTINUED | OUTPATIENT
Start: 2023-09-13 | End: 2023-09-14

## 2023-09-13 RX ORDER — SODIUM CHLORIDE 0.9 % (FLUSH) 0.9 %
5-40 SYRINGE (ML) INJECTION PRN
Status: DISCONTINUED | OUTPATIENT
Start: 2023-09-13 | End: 2023-09-27 | Stop reason: HOSPADM

## 2023-09-13 RX ORDER — FAMOTIDINE 20 MG/1
20 TABLET, FILM COATED ORAL 2 TIMES DAILY PRN
Status: DISCONTINUED | OUTPATIENT
Start: 2023-09-13 | End: 2023-09-27 | Stop reason: HOSPADM

## 2023-09-13 RX ORDER — SODIUM CHLORIDE 9 MG/ML
INJECTION, SOLUTION INTRAVENOUS PRN
Status: DISCONTINUED | OUTPATIENT
Start: 2023-09-13 | End: 2023-09-27 | Stop reason: HOSPADM

## 2023-09-13 RX ORDER — ONDANSETRON 4 MG/1
4 TABLET, ORALLY DISINTEGRATING ORAL EVERY 8 HOURS PRN
Status: DISCONTINUED | OUTPATIENT
Start: 2023-09-13 | End: 2023-09-27 | Stop reason: HOSPADM

## 2023-09-13 RX ORDER — SODIUM CHLORIDE 0.9 % (FLUSH) 0.9 %
5-40 SYRINGE (ML) INJECTION EVERY 12 HOURS SCHEDULED
Status: DISCONTINUED | OUTPATIENT
Start: 2023-09-13 | End: 2023-09-27 | Stop reason: HOSPADM

## 2023-09-13 RX ORDER — SENNA AND DOCUSATE SODIUM 50; 8.6 MG/1; MG/1
2 TABLET, FILM COATED ORAL DAILY
Status: DISCONTINUED | OUTPATIENT
Start: 2023-09-14 | End: 2023-09-27 | Stop reason: HOSPADM

## 2023-09-13 RX ORDER — POLYETHYLENE GLYCOL 3350 17 G/17G
17 POWDER, FOR SOLUTION ORAL DAILY
Status: DISCONTINUED | OUTPATIENT
Start: 2023-09-14 | End: 2023-09-25

## 2023-09-13 RX ORDER — OXYCODONE HYDROCHLORIDE 5 MG/1
10 TABLET ORAL EVERY 4 HOURS PRN
Status: DISCONTINUED | OUTPATIENT
Start: 2023-09-13 | End: 2023-09-14

## 2023-09-13 RX ORDER — IBUPROFEN 200 MG
800 TABLET ORAL EVERY 8 HOURS PRN
COMMUNITY

## 2023-09-13 RX ADMIN — SODIUM CHLORIDE, PRESERVATIVE FREE 10 ML: 5 INJECTION INTRAVENOUS at 11:43

## 2023-09-13 RX ADMIN — OXYCODONE HYDROCHLORIDE 10 MG: 5 TABLET ORAL at 07:00

## 2023-09-13 RX ADMIN — CEFEPIME 2000 MG: 2 INJECTION, POWDER, FOR SOLUTION INTRAVENOUS at 00:07

## 2023-09-13 RX ADMIN — OXYCODONE HYDROCHLORIDE 10 MG: 5 TABLET ORAL at 02:41

## 2023-09-13 RX ADMIN — HYDROMORPHONE HYDROCHLORIDE 0.5 MG: 1 INJECTION, SOLUTION INTRAMUSCULAR; INTRAVENOUS; SUBCUTANEOUS at 09:42

## 2023-09-13 RX ADMIN — HYDROMORPHONE HYDROCHLORIDE 0.5 MG: 1 INJECTION, SOLUTION INTRAMUSCULAR; INTRAVENOUS; SUBCUTANEOUS at 14:19

## 2023-09-13 RX ADMIN — ENOXAPARIN SODIUM 40 MG: 100 INJECTION SUBCUTANEOUS at 09:43

## 2023-09-13 RX ADMIN — Medication 1 CAPSULE: at 09:41

## 2023-09-13 RX ADMIN — HYDROMORPHONE HYDROCHLORIDE 0.5 MG: 1 INJECTION, SOLUTION INTRAMUSCULAR; INTRAVENOUS; SUBCUTANEOUS at 04:17

## 2023-09-13 RX ADMIN — GABAPENTIN 1200 MG: 300 CAPSULE ORAL at 09:41

## 2023-09-13 RX ADMIN — SENNOSIDES AND DOCUSATE SODIUM 2 TABLET: 50; 8.6 TABLET ORAL at 09:41

## 2023-09-13 RX ADMIN — GABAPENTIN 1200 MG: 300 CAPSULE ORAL at 20:45

## 2023-09-13 RX ADMIN — VANCOMYCIN HYDROCHLORIDE 1000 MG: 1 INJECTION, POWDER, LYOPHILIZED, FOR SOLUTION INTRAVENOUS at 11:34

## 2023-09-13 RX ADMIN — OXYCODONE HYDROCHLORIDE 10 MG: 5 TABLET ORAL at 18:54

## 2023-09-13 RX ADMIN — OXYCODONE HYDROCHLORIDE 10 MG: 5 TABLET ORAL at 11:34

## 2023-09-13 RX ADMIN — CEFEPIME 2000 MG: 2 INJECTION, POWDER, FOR SOLUTION INTRAVENOUS at 09:42

## 2023-09-13 RX ADMIN — HYDROXYZINE HYDROCHLORIDE 25 MG: 25 TABLET, FILM COATED ORAL at 18:54

## 2023-09-13 RX ADMIN — OXYCODONE HYDROCHLORIDE 10 MG: 5 TABLET ORAL at 15:38

## 2023-09-13 RX ADMIN — PANTOPRAZOLE SODIUM 40 MG: 40 TABLET, DELAYED RELEASE ORAL at 07:01

## 2023-09-13 RX ADMIN — HYDROMORPHONE HYDROCHLORIDE 0.5 MG: 1 INJECTION, SOLUTION INTRAMUSCULAR; INTRAVENOUS; SUBCUTANEOUS at 00:20

## 2023-09-13 RX ADMIN — VANCOMYCIN HYDROCHLORIDE 1000 MG: 1 INJECTION, POWDER, LYOPHILIZED, FOR SOLUTION INTRAVENOUS at 20:44

## 2023-09-13 RX ADMIN — SODIUM CHLORIDE, PRESERVATIVE FREE 10 ML: 5 INJECTION INTRAVENOUS at 20:45

## 2023-09-13 RX ADMIN — POLYETHYLENE GLYCOL 3350 17 G: 17 POWDER, FOR SOLUTION ORAL at 09:41

## 2023-09-13 RX ADMIN — GABAPENTIN 1200 MG: 300 CAPSULE ORAL at 14:20

## 2023-09-13 ASSESSMENT — PAIN DESCRIPTION - PAIN TYPE
TYPE: CHRONIC PAIN
TYPE: CHRONIC PAIN

## 2023-09-13 ASSESSMENT — PAIN DESCRIPTION - ORIENTATION
ORIENTATION: LOWER

## 2023-09-13 ASSESSMENT — PAIN DESCRIPTION - ONSET
ONSET: ON-GOING

## 2023-09-13 ASSESSMENT — PAIN DESCRIPTION - LOCATION
LOCATION: BACK

## 2023-09-13 ASSESSMENT — PAIN SCALES - GENERAL
PAINLEVEL_OUTOF10: 9
PAINLEVEL_OUTOF10: 6
PAINLEVEL_OUTOF10: 8
PAINLEVEL_OUTOF10: 9
PAINLEVEL_OUTOF10: 8
PAINLEVEL_OUTOF10: 9
PAINLEVEL_OUTOF10: 9
PAINLEVEL_OUTOF10: 7
PAINLEVEL_OUTOF10: 4
PAINLEVEL_OUTOF10: 10
PAINLEVEL_OUTOF10: 9

## 2023-09-13 ASSESSMENT — PAIN DESCRIPTION - DESCRIPTORS
DESCRIPTORS: ACHING
DESCRIPTORS: BURNING;ACHING
DESCRIPTORS: SHARP
DESCRIPTORS: ACHING;CRAMPING;SHARP
DESCRIPTORS: ACHING
DESCRIPTORS: ACHING;CRAMPING;SHARP

## 2023-09-13 ASSESSMENT — PAIN DESCRIPTION - FREQUENCY
FREQUENCY: CONTINUOUS
FREQUENCY: CONTINUOUS

## 2023-09-13 NOTE — CARE COORDINATION
Transition of Care Plan:    RUR: 10%  Prior Level of Functioning:  Independent with ADLs  Disposition: Placement at [address] or SNF Placement   Follow up appointments: Follow up with PCP and/or Specialist   DME needed: N/A  Transportation at discharge: BLS  IM/IMM Medicare/ letter given: N/A  Is patient a [place] and connected with VA? N/A   If yes, was [employer] transfer form completed and VA notified? N/A  Caregiver Contact: Amanda Spencer (parent) [phone]  Discharge Caregiver contacted prior to discharge? Family to be contacted   Care Conference needed? Not at this time  Barriers to discharge: Placement    UPDATE: 3:13PM        CM informed that [address] is able to accept today for placement. CM informed that physician can complete P2P with H: Dr. Solis Mott. Nursing Report: [phone]  [facility] transport: 4P    CM informed pt of the following and she is agreeable of transport. CM completed the need of the pt at this time. José Miguelmadelaine Dave, MSW 70 \[facility] [place] [facility]\""  [phone]        UPDATE: 12:40PM    CM informed that pt clinicals are being reviewed for [facility]. CM completed room visit with pt, and SNF liaison from [facility]. CM was informed that pt is being reviewed for potential placement at facility. Pt had questions regarding facility visiting hours, as she stated \" my spouse usually comes at 1A, due to his work hours and having to find a ride\". Pt reported that spouse has been to visit her at [facility] twice, and he is having a \"hard time\" with her being in the hospital.  SNF liaison reported that facility has rules, and that visiting hours end at 8:30P. Pt informed by snf liaison that due to her insurance, she is required to stay at the facility for 30 days, in order for facility to receive funds from [place]. Pt was observed to get overwhelmed and tearful, stating that she will like to talk to her spouse in regards to placement option.   CM will revisit pt at a later time, to

## 2023-09-13 NOTE — CARE COORDINATION
DEBORAH LEDBETTER    [facility] Supervisor for Care Management   Review of the chart again due to request for transfer to [facility] To continue medical care and transition. Dr. Tereso Alarcon and the Team has approved patient for transition here once agreed upon request and Hospitalist to [facility] report completed. E-mail sent to [facility] with details     Nursing Report  [phone]  Room [address] [place] [address] Report to me  [phone]   Transportation to arrive before 6 PM   Patient in agreement .     Please give patient and family  # to Nursing Station  [address] MSW RN   [phone]   Nursing Supervisor  [phone]

## 2023-09-14 LAB
CREAT SERPL-MCNC: 1.03 MG/DL (ref 0.55–1.02)
VANCOMYCIN SERPL-MCNC: 16 UG/ML

## 2023-09-14 PROCEDURE — 36415 COLL VENOUS BLD VENIPUNCTURE: CPT

## 2023-09-14 PROCEDURE — 6360000002 HC RX W HCPCS: Performed by: STUDENT IN AN ORGANIZED HEALTH CARE EDUCATION/TRAINING PROGRAM

## 2023-09-14 PROCEDURE — 6370000000 HC RX 637 (ALT 250 FOR IP): Performed by: NURSE PRACTITIONER

## 2023-09-14 PROCEDURE — 1200000000 HC SEMI PRIVATE

## 2023-09-14 PROCEDURE — 2580000003 HC RX 258: Performed by: STUDENT IN AN ORGANIZED HEALTH CARE EDUCATION/TRAINING PROGRAM

## 2023-09-14 PROCEDURE — 80202 ASSAY OF VANCOMYCIN: CPT

## 2023-09-14 PROCEDURE — 6370000000 HC RX 637 (ALT 250 FOR IP): Performed by: STUDENT IN AN ORGANIZED HEALTH CARE EDUCATION/TRAINING PROGRAM

## 2023-09-14 PROCEDURE — 82565 ASSAY OF CREATININE: CPT

## 2023-09-14 RX ORDER — OXYCODONE HYDROCHLORIDE 5 MG/1
10 TABLET ORAL EVERY 6 HOURS PRN
Status: DISPENSED | OUTPATIENT
Start: 2023-09-19 | End: 2023-09-26

## 2023-09-14 RX ORDER — OXYCODONE HYDROCHLORIDE 5 MG/1
5 TABLET ORAL EVERY 6 HOURS PRN
Status: DISCONTINUED | OUTPATIENT
Start: 2023-09-26 | End: 2023-09-27 | Stop reason: HOSPADM

## 2023-09-14 RX ORDER — OXYCODONE HYDROCHLORIDE 5 MG/1
5 TABLET ORAL EVERY 8 HOURS PRN
Status: DISCONTINUED | OUTPATIENT
Start: 2023-10-03 | End: 2023-09-27 | Stop reason: HOSPADM

## 2023-09-14 RX ORDER — BACLOFEN 10 MG/1
10 TABLET ORAL 3 TIMES DAILY
Status: DISCONTINUED | OUTPATIENT
Start: 2023-09-14 | End: 2023-09-27 | Stop reason: HOSPADM

## 2023-09-14 RX ORDER — MECOBALAMIN 5000 MCG
5 TABLET,DISINTEGRATING ORAL NIGHTLY
Status: DISCONTINUED | OUTPATIENT
Start: 2023-09-14 | End: 2023-09-16

## 2023-09-14 RX ORDER — NALOXONE HYDROCHLORIDE 0.4 MG/ML
0.4 INJECTION, SOLUTION INTRAMUSCULAR; INTRAVENOUS; SUBCUTANEOUS PRN
Status: DISCONTINUED | OUTPATIENT
Start: 2023-09-14 | End: 2023-09-27 | Stop reason: HOSPADM

## 2023-09-14 RX ORDER — HEPARIN SODIUM 1000 [USP'U]/ML
1000 INJECTION, SOLUTION INTRAVENOUS; SUBCUTANEOUS PRN
Status: DISCONTINUED | OUTPATIENT
Start: 2023-09-14 | End: 2023-09-14

## 2023-09-14 RX ORDER — HEPARIN 100 UNIT/ML
500 SYRINGE INTRAVENOUS DAILY PRN
Status: DISCONTINUED | OUTPATIENT
Start: 2023-09-14 | End: 2023-09-27 | Stop reason: HOSPADM

## 2023-09-14 RX ORDER — DULOXETIN HYDROCHLORIDE 30 MG/1
30 CAPSULE, DELAYED RELEASE ORAL DAILY
Status: DISCONTINUED | OUTPATIENT
Start: 2023-09-14 | End: 2023-09-17

## 2023-09-14 RX ORDER — DIPHENHYDRAMINE HCL 25 MG
50 CAPSULE ORAL ONCE
Status: COMPLETED | OUTPATIENT
Start: 2023-09-14 | End: 2023-09-14

## 2023-09-14 RX ORDER — IBUPROFEN 200 MG
200 TABLET ORAL EVERY 8 HOURS
Status: DISCONTINUED | OUTPATIENT
Start: 2023-09-14 | End: 2023-09-26

## 2023-09-14 RX ORDER — OXYCODONE HYDROCHLORIDE 5 MG/1
10 TABLET ORAL EVERY 4 HOURS PRN
Status: DISPENSED | OUTPATIENT
Start: 2023-09-14 | End: 2023-09-19

## 2023-09-14 RX ORDER — OXYCODONE HYDROCHLORIDE 5 MG/1
5 TABLET ORAL 2 TIMES DAILY PRN
Status: DISCONTINUED | OUTPATIENT
Start: 2023-10-10 | End: 2023-09-27 | Stop reason: HOSPADM

## 2023-09-14 RX ORDER — HYDROXYZINE HYDROCHLORIDE 25 MG/1
50 TABLET, FILM COATED ORAL 3 TIMES DAILY PRN
Status: DISCONTINUED | OUTPATIENT
Start: 2023-09-14 | End: 2023-09-27 | Stop reason: HOSPADM

## 2023-09-14 RX ORDER — KETOROLAC TROMETHAMINE 30 MG/ML
15 INJECTION, SOLUTION INTRAMUSCULAR; INTRAVENOUS EVERY 6 HOURS PRN
Status: DISPENSED | OUTPATIENT
Start: 2023-09-14 | End: 2023-09-19

## 2023-09-14 RX ORDER — METHOCARBAMOL 500 MG/1
1000 TABLET, FILM COATED ORAL 3 TIMES DAILY PRN
Status: DISCONTINUED | OUTPATIENT
Start: 2023-09-14 | End: 2023-09-27 | Stop reason: HOSPADM

## 2023-09-14 RX ADMIN — POLYETHYLENE GLYCOL 3350 17 G: 17 POWDER, FOR SOLUTION ORAL at 08:58

## 2023-09-14 RX ADMIN — OXYCODONE HYDROCHLORIDE 10 MG: 5 TABLET ORAL at 00:09

## 2023-09-14 RX ADMIN — GABAPENTIN 1200 MG: 300 CAPSULE ORAL at 13:06

## 2023-09-14 RX ADMIN — HYDROXYZINE HYDROCHLORIDE 25 MG: 25 TABLET, FILM COATED ORAL at 02:27

## 2023-09-14 RX ADMIN — KETOROLAC TROMETHAMINE 15 MG: 30 INJECTION, SOLUTION INTRAMUSCULAR; INTRAVENOUS at 17:51

## 2023-09-14 RX ADMIN — DULOXETINE HYDROCHLORIDE 30 MG: 30 CAPSULE, DELAYED RELEASE ORAL at 13:06

## 2023-09-14 RX ADMIN — BACLOFEN 10 MG: 10 TABLET ORAL at 13:06

## 2023-09-14 RX ADMIN — IBUPROFEN 200 MG: 200 TABLET, FILM COATED ORAL at 02:27

## 2023-09-14 RX ADMIN — BACLOFEN 10 MG: 10 TABLET ORAL at 20:51

## 2023-09-14 RX ADMIN — ENOXAPARIN SODIUM 40 MG: 100 INJECTION SUBCUTANEOUS at 08:58

## 2023-09-14 RX ADMIN — GABAPENTIN 1200 MG: 300 CAPSULE ORAL at 20:51

## 2023-09-14 RX ADMIN — GABAPENTIN 1200 MG: 300 CAPSULE ORAL at 08:58

## 2023-09-14 RX ADMIN — CEFEPIME 2000 MG: 2 INJECTION, POWDER, FOR SOLUTION INTRAVENOUS at 23:15

## 2023-09-14 RX ADMIN — Medication 1 CAPSULE: at 08:58

## 2023-09-14 RX ADMIN — VANCOMYCIN HYDROCHLORIDE 1000 MG: 1 INJECTION, POWDER, LYOPHILIZED, FOR SOLUTION INTRAVENOUS at 22:59

## 2023-09-14 RX ADMIN — SODIUM CHLORIDE, PRESERVATIVE FREE 10 ML: 5 INJECTION INTRAVENOUS at 20:53

## 2023-09-14 RX ADMIN — OXYCODONE HYDROCHLORIDE 10 MG: 5 TABLET ORAL at 04:06

## 2023-09-14 RX ADMIN — IBUPROFEN 200 MG: 200 TABLET, FILM COATED ORAL at 23:00

## 2023-09-14 RX ADMIN — OXYCODONE HYDROCHLORIDE 10 MG: 5 TABLET ORAL at 23:00

## 2023-09-14 RX ADMIN — PANTOPRAZOLE SODIUM 40 MG: 40 TABLET, DELAYED RELEASE ORAL at 07:46

## 2023-09-14 RX ADMIN — VANCOMYCIN HYDROCHLORIDE 1000 MG: 1 INJECTION, POWDER, LYOPHILIZED, FOR SOLUTION INTRAVENOUS at 10:30

## 2023-09-14 RX ADMIN — CEFEPIME 2000 MG: 2 INJECTION, POWDER, FOR SOLUTION INTRAVENOUS at 07:46

## 2023-09-14 RX ADMIN — CEFEPIME 2000 MG: 2 INJECTION, POWDER, FOR SOLUTION INTRAVENOUS at 00:09

## 2023-09-14 RX ADMIN — IBUPROFEN 200 MG: 200 TABLET, FILM COATED ORAL at 14:30

## 2023-09-14 RX ADMIN — OXYCODONE HYDROCHLORIDE 10 MG: 5 TABLET ORAL at 14:30

## 2023-09-14 RX ADMIN — OXYCODONE HYDROCHLORIDE 10 MG: 5 TABLET ORAL at 09:06

## 2023-09-14 RX ADMIN — SENNOSIDES AND DOCUSATE SODIUM 2 TABLET: 50; 8.6 TABLET ORAL at 08:58

## 2023-09-14 RX ADMIN — DIPHENHYDRAMINE HYDROCHLORIDE 50 MG: 25 CAPSULE ORAL at 04:07

## 2023-09-14 RX ADMIN — CEFEPIME 2000 MG: 2 INJECTION, POWDER, FOR SOLUTION INTRAVENOUS at 16:03

## 2023-09-14 RX ADMIN — OXYCODONE HYDROCHLORIDE 10 MG: 5 TABLET ORAL at 18:41

## 2023-09-14 ASSESSMENT — PAIN DESCRIPTION - DESCRIPTORS
DESCRIPTORS: ACHING;THROBBING
DESCRIPTORS: THROBBING
DESCRIPTORS: ACHING
DESCRIPTORS: ACHING;THROBBING

## 2023-09-14 ASSESSMENT — PAIN SCALES - GENERAL
PAINLEVEL_OUTOF10: 8
PAINLEVEL_OUTOF10: 8
PAINLEVEL_OUTOF10: 9
PAINLEVEL_OUTOF10: 9
PAINLEVEL_OUTOF10: 2
PAINLEVEL_OUTOF10: 2
PAINLEVEL_OUTOF10: 4
PAINLEVEL_OUTOF10: 10
PAINLEVEL_OUTOF10: 9
PAINLEVEL_OUTOF10: 8
PAINLEVEL_OUTOF10: 4
PAINLEVEL_OUTOF10: 8
PAINLEVEL_OUTOF10: 6
PAINLEVEL_OUTOF10: 8
PAINLEVEL_OUTOF10: 10

## 2023-09-14 ASSESSMENT — PAIN DESCRIPTION - LOCATION
LOCATION: BACK

## 2023-09-14 ASSESSMENT — PAIN DESCRIPTION - ORIENTATION: ORIENTATION: LOWER

## 2023-09-14 NOTE — H&P
History & Physical    Primary Care Provider: None None  Source of Information: Patient and chart review    History of Presenting Illness:   Monique Fajardo is a 38 y.o. female with history of IV drug use hep C, cirrhosis, tobacco use, major depressive disorder with generalized anxiety disorder who is admitted initially at [facility] and subsequently transferred to [facility] and treatment of discitis. Admitted to hospital initially with 3 weeks of worsening. Subsequently had MRI of the lumbar spine which demonstrated L4-L5 discitis without drainable fluid status. Transferred to [facility] for long-term antibiotics. The patient denies any fever, chills, chest or abdominal pain, nausea, vomiting, cough, congestion, recent illness, palpitations, or dysuria. Review of Systems:  Pertinent items are noted in the History of Present Illness.      Past Medical History:   Diagnosis Date    Arthritis     back- DDD    Belching     Cancer ([address])     advanced lesions on cervix, no surgery    Chronic pain     back pain, untreated    Cirrhosis ([address])     Depression     GERD (gastroesophageal reflux disease)     treats with baking soda and water    Headache     Hypercholesterolemia     Ill-defined condition     polycystic ovary disease    Liver disease 2015    Hep C    Nausea & vomiting     Psychiatric disorder     PTSD from childhood trauma    Sciatica     bilateral    Scoliosis     Swallowing difficulty     Thyroid disease     \"slow\"    TMJ pain dysfunction syndrome     Vomiting blood       Past Surgical History:   Procedure Laterality Date    GI      cholecystectomy  1997    GYN      D & C's x 3 last one 1998    HEENT      27 teeth removed,  for dentures    HYSTERECTOMY ([address])      ORTHOPEDIC SURGERY      spinal tumor removed from spine 8th grade    RI UNLISTED PROCEDURE CARDIAC SURGERY      heart cath 2002 or 2003, normal per patient- [facility]    UPPER GI

## 2023-09-14 NOTE — CARE COORDINATION
09/14/23 1354   Readmission Assessment   Number of Days since last admission? 1-7 days  (Planned D/C from [facility] for a planned admission to [facility])   Previous Disposition Other (comment)  (Planned D/C from [facility] for a planned admission to [facility])   Who is being Interviewed Patient   What was the patient's/caregiver's perception as to why they think they needed to return back to the hospital? Other (Comment)  (Planned D/C from [facility] for a planned admission to [facility])   Did you visit your Primary Care Physician after you left the hospital, before you returned this time? No  (Planned D/C from [facility] for a planned admission to [facility])   Why weren't you able to visit your PCP? Other (Comment)  (Planned D/C from [facility] for a planned admission to [facility])   Did you see a specialist, such as Cardiac, Pulmonary, Orthopedic Physician, etc. after you left the hospital? Other (Comment)  (Planned D/C from [facility] for a planned admission to [facility])   Who advised the patient to return to the hospital? Other (Comment)  (Planned D/C from [facility] for a planned admission to [facility])   Does the patient report anything that got in the way of taking their medications? No   In our efforts to provide the best possible care to you and others like you, can you think of anything that we could have done to help you after you left the hospital the first time, so that you might not have needed to return so soon?  Other (Comment)  (Planned D/C from [facility] for a planned admission to [facility])     Rob Clemente RN CM

## 2023-09-14 NOTE — CARE COORDINATION
09/14/23 8287   Service Assessment   Patient Orientation Alert and Oriented;Person;Place;Situation;Self   Cognition Alert   History Provided By Patient   Primary Caregiver Self   Accompanied By/Relationship N/A   Support Systems Spouse/Significant Other;Children;Family Members;Friends/Neighbors   Patient's Healthcare Decision Maker is: Legal Next of Kin   PCP Verified by CM No   Prior Functional Level Independent in ADLs/IADLs   Current Functional Level Assistance with the following:   Can patient return to prior living arrangement Yes   Ability to make needs known: Good   Family able to assist with home care needs: Yes   Would you like for me to discuss the discharge plan with any other family members/significant others, and if so, who? No   Financial Resources Other (Comment); Medicaid   Community Resources None   Discharge Planning   Type of Residence Trailer/Mobile Home   Living Arrangements Spouse/Significant Other   Current Services Prior To Admission None   Potential Assistance Needed Other (Comment)  (will follow up)   Potential Assistance Purchasing Medications No   Type of Home Care Services None   Patient expects to be discharged to: Trailer/mobile home   Follow Up Appointment: Best Day/Time    (anytime)   One/Two Story Residence One story   History of falls? 0   Services At/After Discharge   Transition of Care Consult (CM Consult) Discharge [facility] Provided? No   Mode of Transport at Discharge Other (see comment)  (Spouse)   Confirm Follow Up Transport Family   Condition of Participation: Discharge Planning   The Plan for Transition of Care is related to the following treatment goals: PCP   The Patient and/or Patient Representative was provided with a Choice of Provider? Patient   The Patient and/Or Patient Representative agree with the Discharge Plan?  Yes   Freedom of Choice list was provided with basic dialogue that supports the patient's individualized plan of

## 2023-09-15 LAB — CREAT SERPL-MCNC: 0.95 MG/DL (ref 0.55–1.02)

## 2023-09-15 PROCEDURE — 1200000000 HC SEMI PRIVATE

## 2023-09-15 PROCEDURE — 6370000000 HC RX 637 (ALT 250 FOR IP): Performed by: STUDENT IN AN ORGANIZED HEALTH CARE EDUCATION/TRAINING PROGRAM

## 2023-09-15 PROCEDURE — 97161 PT EVAL LOW COMPLEX 20 MIN: CPT | Performed by: PHYSICAL THERAPIST

## 2023-09-15 PROCEDURE — 6360000002 HC RX W HCPCS: Performed by: STUDENT IN AN ORGANIZED HEALTH CARE EDUCATION/TRAINING PROGRAM

## 2023-09-15 PROCEDURE — 2580000003 HC RX 258: Performed by: STUDENT IN AN ORGANIZED HEALTH CARE EDUCATION/TRAINING PROGRAM

## 2023-09-15 PROCEDURE — 82565 ASSAY OF CREATININE: CPT

## 2023-09-15 PROCEDURE — 36415 COLL VENOUS BLD VENIPUNCTURE: CPT

## 2023-09-15 RX ADMIN — ENOXAPARIN SODIUM 40 MG: 100 INJECTION SUBCUTANEOUS at 08:41

## 2023-09-15 RX ADMIN — ONDANSETRON HYDROCHLORIDE 4 MG: 2 INJECTION INTRAMUSCULAR; INTRAVENOUS at 01:01

## 2023-09-15 RX ADMIN — HYDROXYZINE HYDROCHLORIDE 50 MG: 25 TABLET, FILM COATED ORAL at 12:50

## 2023-09-15 RX ADMIN — PANTOPRAZOLE SODIUM 40 MG: 40 TABLET, DELAYED RELEASE ORAL at 06:05

## 2023-09-15 RX ADMIN — SODIUM CHLORIDE, PRESERVATIVE FREE 10 ML: 5 INJECTION INTRAVENOUS at 08:41

## 2023-09-15 RX ADMIN — OXYCODONE HYDROCHLORIDE 10 MG: 5 TABLET ORAL at 16:05

## 2023-09-15 RX ADMIN — BACLOFEN 10 MG: 10 TABLET ORAL at 21:14

## 2023-09-15 RX ADMIN — DULOXETINE HYDROCHLORIDE 30 MG: 30 CAPSULE, DELAYED RELEASE ORAL at 08:40

## 2023-09-15 RX ADMIN — ONDANSETRON HYDROCHLORIDE 4 MG: 2 INJECTION INTRAMUSCULAR; INTRAVENOUS at 12:50

## 2023-09-15 RX ADMIN — GABAPENTIN 1200 MG: 300 CAPSULE ORAL at 14:08

## 2023-09-15 RX ADMIN — SENNOSIDES AND DOCUSATE SODIUM 2 TABLET: 50; 8.6 TABLET ORAL at 08:40

## 2023-09-15 RX ADMIN — GABAPENTIN 1200 MG: 300 CAPSULE ORAL at 08:40

## 2023-09-15 RX ADMIN — OXYCODONE HYDROCHLORIDE 10 MG: 5 TABLET ORAL at 03:17

## 2023-09-15 RX ADMIN — CEFEPIME 2000 MG: 2 INJECTION, POWDER, FOR SOLUTION INTRAVENOUS at 15:40

## 2023-09-15 RX ADMIN — VANCOMYCIN HYDROCHLORIDE 1000 MG: 1 INJECTION, POWDER, LYOPHILIZED, FOR SOLUTION INTRAVENOUS at 23:15

## 2023-09-15 RX ADMIN — CEFEPIME 2000 MG: 2 INJECTION, POWDER, FOR SOLUTION INTRAVENOUS at 07:49

## 2023-09-15 RX ADMIN — BACLOFEN 10 MG: 10 TABLET ORAL at 08:40

## 2023-09-15 RX ADMIN — OXYCODONE HYDROCHLORIDE 10 MG: 5 TABLET ORAL at 06:57

## 2023-09-15 RX ADMIN — BACLOFEN 10 MG: 10 TABLET ORAL at 14:08

## 2023-09-15 RX ADMIN — Medication 1 CAPSULE: at 08:40

## 2023-09-15 RX ADMIN — VANCOMYCIN HYDROCHLORIDE 1000 MG: 1 INJECTION, POWDER, LYOPHILIZED, FOR SOLUTION INTRAVENOUS at 11:00

## 2023-09-15 RX ADMIN — GABAPENTIN 1200 MG: 300 CAPSULE ORAL at 21:14

## 2023-09-15 RX ADMIN — IBUPROFEN 200 MG: 200 TABLET, FILM COATED ORAL at 23:15

## 2023-09-15 RX ADMIN — ONDANSETRON HYDROCHLORIDE 4 MG: 2 INJECTION INTRAMUSCULAR; INTRAVENOUS at 19:19

## 2023-09-15 RX ADMIN — KETOROLAC TROMETHAMINE 15 MG: 30 INJECTION, SOLUTION INTRAMUSCULAR; INTRAVENOUS at 12:50

## 2023-09-15 RX ADMIN — CEFEPIME 2000 MG: 2 INJECTION, POWDER, FOR SOLUTION INTRAVENOUS at 23:16

## 2023-09-15 RX ADMIN — IBUPROFEN 200 MG: 200 TABLET, FILM COATED ORAL at 15:20

## 2023-09-15 RX ADMIN — OXYCODONE HYDROCHLORIDE 10 MG: 5 TABLET ORAL at 21:14

## 2023-09-15 RX ADMIN — SODIUM CHLORIDE, PRESERVATIVE FREE 10 ML: 5 INJECTION INTRAVENOUS at 21:16

## 2023-09-15 RX ADMIN — IBUPROFEN 200 MG: 200 TABLET, FILM COATED ORAL at 06:05

## 2023-09-15 RX ADMIN — OXYCODONE HYDROCHLORIDE 10 MG: 5 TABLET ORAL at 11:00

## 2023-09-15 ASSESSMENT — PAIN DESCRIPTION - DESCRIPTORS
DESCRIPTORS: ACHING
DESCRIPTORS: ACHING
DESCRIPTORS: THROBBING;STABBING

## 2023-09-15 ASSESSMENT — PAIN DESCRIPTION - LOCATION
LOCATION: BACK

## 2023-09-15 ASSESSMENT — PAIN SCALES - GENERAL
PAINLEVEL_OUTOF10: 8
PAINLEVEL_OUTOF10: 3
PAINLEVEL_OUTOF10: 7
PAINLEVEL_OUTOF10: 8
PAINLEVEL_OUTOF10: 9

## 2023-09-15 ASSESSMENT — PAIN DESCRIPTION - PAIN TYPE: TYPE: CHRONIC PAIN

## 2023-09-15 ASSESSMENT — PAIN DESCRIPTION - ORIENTATION
ORIENTATION: LOWER

## 2023-09-15 ASSESSMENT — PAIN DESCRIPTION - FREQUENCY: FREQUENCY: CONTINUOUS

## 2023-09-15 ASSESSMENT — PAIN - FUNCTIONAL ASSESSMENT: PAIN_FUNCTIONAL_ASSESSMENT: ACTIVITIES ARE NOT PREVENTED

## 2023-09-16 LAB — CREAT SERPL-MCNC: 0.96 MG/DL (ref 0.55–1.02)

## 2023-09-16 PROCEDURE — 6360000002 HC RX W HCPCS: Performed by: STUDENT IN AN ORGANIZED HEALTH CARE EDUCATION/TRAINING PROGRAM

## 2023-09-16 PROCEDURE — 36415 COLL VENOUS BLD VENIPUNCTURE: CPT

## 2023-09-16 PROCEDURE — 2580000003 HC RX 258: Performed by: STUDENT IN AN ORGANIZED HEALTH CARE EDUCATION/TRAINING PROGRAM

## 2023-09-16 PROCEDURE — 1200000000 HC SEMI PRIVATE

## 2023-09-16 PROCEDURE — 6370000000 HC RX 637 (ALT 250 FOR IP): Performed by: STUDENT IN AN ORGANIZED HEALTH CARE EDUCATION/TRAINING PROGRAM

## 2023-09-16 PROCEDURE — 82565 ASSAY OF CREATININE: CPT

## 2023-09-16 RX ADMIN — IBUPROFEN 200 MG: 200 TABLET, FILM COATED ORAL at 08:46

## 2023-09-16 RX ADMIN — SENNOSIDES AND DOCUSATE SODIUM 2 TABLET: 50; 8.6 TABLET ORAL at 08:46

## 2023-09-16 RX ADMIN — OXYCODONE HYDROCHLORIDE 10 MG: 5 TABLET ORAL at 03:42

## 2023-09-16 RX ADMIN — SODIUM CHLORIDE, PRESERVATIVE FREE 10 ML: 5 INJECTION INTRAVENOUS at 21:27

## 2023-09-16 RX ADMIN — BACLOFEN 10 MG: 10 TABLET ORAL at 08:46

## 2023-09-16 RX ADMIN — BACLOFEN 10 MG: 10 TABLET ORAL at 21:26

## 2023-09-16 RX ADMIN — CEFEPIME 2000 MG: 2 INJECTION, POWDER, FOR SOLUTION INTRAVENOUS at 08:45

## 2023-09-16 RX ADMIN — OXYCODONE HYDROCHLORIDE 10 MG: 5 TABLET ORAL at 21:26

## 2023-09-16 RX ADMIN — OXYCODONE HYDROCHLORIDE 10 MG: 5 TABLET ORAL at 17:22

## 2023-09-16 RX ADMIN — Medication 1 CAPSULE: at 08:46

## 2023-09-16 RX ADMIN — ENOXAPARIN SODIUM 40 MG: 100 INJECTION SUBCUTANEOUS at 08:45

## 2023-09-16 RX ADMIN — OXYCODONE HYDROCHLORIDE 10 MG: 5 TABLET ORAL at 13:01

## 2023-09-16 RX ADMIN — BACLOFEN 10 MG: 10 TABLET ORAL at 14:48

## 2023-09-16 RX ADMIN — OXYCODONE HYDROCHLORIDE 10 MG: 5 TABLET ORAL at 08:46

## 2023-09-16 RX ADMIN — IBUPROFEN 200 MG: 200 TABLET, FILM COATED ORAL at 23:24

## 2023-09-16 RX ADMIN — VANCOMYCIN HYDROCHLORIDE 1000 MG: 1 INJECTION, POWDER, LYOPHILIZED, FOR SOLUTION INTRAVENOUS at 11:07

## 2023-09-16 RX ADMIN — HYDROXYZINE HYDROCHLORIDE 50 MG: 25 TABLET, FILM COATED ORAL at 13:02

## 2023-09-16 RX ADMIN — CEFEPIME 2000 MG: 2 INJECTION, POWDER, FOR SOLUTION INTRAVENOUS at 16:17

## 2023-09-16 RX ADMIN — GABAPENTIN 1200 MG: 300 CAPSULE ORAL at 14:48

## 2023-09-16 RX ADMIN — KETOROLAC TROMETHAMINE 15 MG: 30 INJECTION, SOLUTION INTRAMUSCULAR; INTRAVENOUS at 01:32

## 2023-09-16 RX ADMIN — IBUPROFEN 200 MG: 200 TABLET, FILM COATED ORAL at 14:48

## 2023-09-16 RX ADMIN — SODIUM CHLORIDE, PRESERVATIVE FREE 10 ML: 5 INJECTION INTRAVENOUS at 08:47

## 2023-09-16 RX ADMIN — DULOXETINE HYDROCHLORIDE 30 MG: 30 CAPSULE, DELAYED RELEASE ORAL at 08:46

## 2023-09-16 RX ADMIN — HYDROXYZINE HYDROCHLORIDE 50 MG: 25 TABLET, FILM COATED ORAL at 20:39

## 2023-09-16 RX ADMIN — HYDROXYZINE HYDROCHLORIDE 50 MG: 25 TABLET, FILM COATED ORAL at 01:42

## 2023-09-16 RX ADMIN — PANTOPRAZOLE SODIUM 40 MG: 40 TABLET, DELAYED RELEASE ORAL at 08:46

## 2023-09-16 RX ADMIN — METHOCARBAMOL TABLETS 1000 MG: 500 TABLET, COATED ORAL at 11:55

## 2023-09-16 RX ADMIN — GABAPENTIN 1200 MG: 300 CAPSULE ORAL at 08:46

## 2023-09-16 RX ADMIN — GABAPENTIN 1200 MG: 300 CAPSULE ORAL at 21:26

## 2023-09-16 ASSESSMENT — PAIN SCALES - GENERAL
PAINLEVEL_OUTOF10: 8
PAINLEVEL_OUTOF10: 7
PAINLEVEL_OUTOF10: 9
PAINLEVEL_OUTOF10: 0
PAINLEVEL_OUTOF10: 0
PAINLEVEL_OUTOF10: 8
PAINLEVEL_OUTOF10: 9

## 2023-09-16 ASSESSMENT — PAIN DESCRIPTION - ORIENTATION
ORIENTATION: RIGHT;LEFT;ANTERIOR;POSTERIOR
ORIENTATION: RIGHT;LEFT
ORIENTATION: LOWER
ORIENTATION: LOWER
ORIENTATION: RIGHT;LEFT;ANTERIOR;POSTERIOR
ORIENTATION: RIGHT;LEFT

## 2023-09-16 ASSESSMENT — PAIN - FUNCTIONAL ASSESSMENT
PAIN_FUNCTIONAL_ASSESSMENT: ACTIVITIES ARE NOT PREVENTED
PAIN_FUNCTIONAL_ASSESSMENT: PREVENTS OR INTERFERES SOME ACTIVE ACTIVITIES AND ADLS
PAIN_FUNCTIONAL_ASSESSMENT: ACTIVITIES ARE NOT PREVENTED
PAIN_FUNCTIONAL_ASSESSMENT: PREVENTS OR INTERFERES SOME ACTIVE ACTIVITIES AND ADLS

## 2023-09-16 ASSESSMENT — PAIN DESCRIPTION - FREQUENCY: FREQUENCY: CONTINUOUS

## 2023-09-16 ASSESSMENT — PAIN DESCRIPTION - DESCRIPTORS
DESCRIPTORS: ACHING;SORE
DESCRIPTORS: ACHING
DESCRIPTORS: ACHING
DESCRIPTORS: THROBBING;STABBING
DESCRIPTORS: ACHING;SORE;STABBING
DESCRIPTORS: THROBBING;STABBING

## 2023-09-16 ASSESSMENT — PAIN DESCRIPTION - LOCATION
LOCATION: BACK
LOCATION: GENERALIZED
LOCATION: GENERALIZED
LOCATION: BACK

## 2023-09-16 ASSESSMENT — PAIN DESCRIPTION - PAIN TYPE: TYPE: CHRONIC PAIN

## 2023-09-17 LAB
CREAT SERPL-MCNC: 0.95 MG/DL (ref 0.55–1.02)
FOLATE SERPL-MCNC: 4.8 NG/ML (ref 5–21)
TSH SERPL DL<=0.05 MIU/L-ACNC: 2.07 UIU/ML (ref 0.36–3.74)
VIT B12 SERPL-MCNC: 463 PG/ML (ref 193–986)

## 2023-09-17 PROCEDURE — 6370000000 HC RX 637 (ALT 250 FOR IP): Performed by: STUDENT IN AN ORGANIZED HEALTH CARE EDUCATION/TRAINING PROGRAM

## 2023-09-17 PROCEDURE — 82565 ASSAY OF CREATININE: CPT

## 2023-09-17 PROCEDURE — 36592 COLLECT BLOOD FROM PICC: CPT

## 2023-09-17 PROCEDURE — 82607 VITAMIN B-12: CPT

## 2023-09-17 PROCEDURE — 36415 COLL VENOUS BLD VENIPUNCTURE: CPT

## 2023-09-17 PROCEDURE — 84443 ASSAY THYROID STIM HORMONE: CPT

## 2023-09-17 PROCEDURE — 2580000003 HC RX 258: Performed by: STUDENT IN AN ORGANIZED HEALTH CARE EDUCATION/TRAINING PROGRAM

## 2023-09-17 PROCEDURE — 6360000002 HC RX W HCPCS: Performed by: STUDENT IN AN ORGANIZED HEALTH CARE EDUCATION/TRAINING PROGRAM

## 2023-09-17 PROCEDURE — 82746 ASSAY OF FOLIC ACID SERUM: CPT

## 2023-09-17 PROCEDURE — 1200000000 HC SEMI PRIVATE

## 2023-09-17 RX ORDER — DULOXETIN HYDROCHLORIDE 30 MG/1
30 CAPSULE, DELAYED RELEASE ORAL
Status: COMPLETED | OUTPATIENT
Start: 2023-09-17 | End: 2023-09-17

## 2023-09-17 RX ORDER — PROPRANOLOL HCL 60 MG
60 CAPSULE, EXTENDED RELEASE 24HR ORAL DAILY
Status: DISCONTINUED | OUTPATIENT
Start: 2023-09-17 | End: 2023-09-25

## 2023-09-17 RX ORDER — DULOXETIN HYDROCHLORIDE 60 MG/1
60 CAPSULE, DELAYED RELEASE ORAL DAILY
Status: DISCONTINUED | OUTPATIENT
Start: 2023-09-18 | End: 2023-09-27 | Stop reason: HOSPADM

## 2023-09-17 RX ADMIN — GABAPENTIN 1200 MG: 300 CAPSULE ORAL at 21:35

## 2023-09-17 RX ADMIN — OXYCODONE HYDROCHLORIDE 10 MG: 5 TABLET ORAL at 11:14

## 2023-09-17 RX ADMIN — CEFEPIME 2000 MG: 2 INJECTION, POWDER, FOR SOLUTION INTRAVENOUS at 00:05

## 2023-09-17 RX ADMIN — IBUPROFEN 200 MG: 200 TABLET, FILM COATED ORAL at 14:17

## 2023-09-17 RX ADMIN — GABAPENTIN 1200 MG: 300 CAPSULE ORAL at 09:25

## 2023-09-17 RX ADMIN — SENNOSIDES AND DOCUSATE SODIUM 2 TABLET: 50; 8.6 TABLET ORAL at 09:26

## 2023-09-17 RX ADMIN — CEFEPIME 2000 MG: 2 INJECTION, POWDER, FOR SOLUTION INTRAVENOUS at 09:25

## 2023-09-17 RX ADMIN — VANCOMYCIN HYDROCHLORIDE 1000 MG: 1 INJECTION, POWDER, LYOPHILIZED, FOR SOLUTION INTRAVENOUS at 00:09

## 2023-09-17 RX ADMIN — VANCOMYCIN HYDROCHLORIDE 1000 MG: 1 INJECTION, POWDER, LYOPHILIZED, FOR SOLUTION INTRAVENOUS at 11:14

## 2023-09-17 RX ADMIN — OXYCODONE HYDROCHLORIDE 10 MG: 5 TABLET ORAL at 07:20

## 2023-09-17 RX ADMIN — ONDANSETRON HYDROCHLORIDE 4 MG: 2 INJECTION INTRAMUSCULAR; INTRAVENOUS at 07:22

## 2023-09-17 RX ADMIN — OXYCODONE HYDROCHLORIDE 10 MG: 5 TABLET ORAL at 03:10

## 2023-09-17 RX ADMIN — KETOROLAC TROMETHAMINE 15 MG: 30 INJECTION, SOLUTION INTRAMUSCULAR; INTRAVENOUS at 12:35

## 2023-09-17 RX ADMIN — DULOXETINE HYDROCHLORIDE 30 MG: 30 CAPSULE, DELAYED RELEASE ORAL at 09:26

## 2023-09-17 RX ADMIN — OXYCODONE HYDROCHLORIDE 10 MG: 5 TABLET ORAL at 21:37

## 2023-09-17 RX ADMIN — PROPRANOLOL HYDROCHLORIDE 60 MG: 60 CAPSULE, EXTENDED RELEASE ORAL at 14:17

## 2023-09-17 RX ADMIN — BACLOFEN 10 MG: 10 TABLET ORAL at 14:17

## 2023-09-17 RX ADMIN — HEPARIN 300 UNITS: 100 SYRINGE at 05:37

## 2023-09-17 RX ADMIN — SODIUM CHLORIDE, PRESERVATIVE FREE 10 ML: 5 INJECTION INTRAVENOUS at 21:36

## 2023-09-17 RX ADMIN — CEFEPIME 2000 MG: 2 INJECTION, POWDER, FOR SOLUTION INTRAVENOUS at 16:09

## 2023-09-17 RX ADMIN — Medication 1 CAPSULE: at 09:26

## 2023-09-17 RX ADMIN — KETOROLAC TROMETHAMINE 15 MG: 30 INJECTION, SOLUTION INTRAMUSCULAR; INTRAVENOUS at 19:25

## 2023-09-17 RX ADMIN — PANTOPRAZOLE SODIUM 40 MG: 40 TABLET, DELAYED RELEASE ORAL at 07:59

## 2023-09-17 RX ADMIN — ENOXAPARIN SODIUM 40 MG: 100 INJECTION SUBCUTANEOUS at 09:25

## 2023-09-17 RX ADMIN — IBUPROFEN 200 MG: 200 TABLET, FILM COATED ORAL at 07:59

## 2023-09-17 RX ADMIN — GABAPENTIN 1200 MG: 300 CAPSULE ORAL at 14:17

## 2023-09-17 RX ADMIN — DULOXETINE HYDROCHLORIDE 30 MG: 30 CAPSULE, DELAYED RELEASE ORAL at 12:34

## 2023-09-17 RX ADMIN — HYDROXYZINE HYDROCHLORIDE 50 MG: 25 TABLET, FILM COATED ORAL at 14:17

## 2023-09-17 RX ADMIN — SODIUM CHLORIDE, PRESERVATIVE FREE 10 ML: 5 INJECTION INTRAVENOUS at 09:27

## 2023-09-17 RX ADMIN — OXYCODONE HYDROCHLORIDE 10 MG: 5 TABLET ORAL at 16:08

## 2023-09-17 RX ADMIN — BACLOFEN 10 MG: 10 TABLET ORAL at 09:26

## 2023-09-17 RX ADMIN — BACLOFEN 10 MG: 10 TABLET ORAL at 21:35

## 2023-09-17 ASSESSMENT — PAIN SCALES - GENERAL
PAINLEVEL_OUTOF10: 7
PAINLEVEL_OUTOF10: 8
PAINLEVEL_OUTOF10: 0
PAINLEVEL_OUTOF10: 5
PAINLEVEL_OUTOF10: 9
PAINLEVEL_OUTOF10: 9
PAINLEVEL_OUTOF10: 10
PAINLEVEL_OUTOF10: 9
PAINLEVEL_OUTOF10: 9
PAINLEVEL_OUTOF10: 8

## 2023-09-17 ASSESSMENT — PAIN DESCRIPTION - ORIENTATION
ORIENTATION: RIGHT;LEFT;ANTERIOR;POSTERIOR

## 2023-09-17 ASSESSMENT — PAIN DESCRIPTION - DESCRIPTORS
DESCRIPTORS: ACHING

## 2023-09-17 ASSESSMENT — PAIN DESCRIPTION - LOCATION
LOCATION: BACK
LOCATION: GENERALIZED
LOCATION: BACK
LOCATION: GENERALIZED
LOCATION: BACK

## 2023-09-18 LAB
BACTERIA SPEC CULT: NORMAL
BACTERIA SPEC CULT: NORMAL
CREAT SERPL-MCNC: 1.02 MG/DL (ref 0.55–1.02)
FLUAV RNA SPEC QL NAA+PROBE: NOT DETECTED
FLUBV RNA SPEC QL NAA+PROBE: NOT DETECTED
SARS-COV-2 RNA RESP QL NAA+PROBE: NOT DETECTED
SERVICE CMNT-IMP: NORMAL
SERVICE CMNT-IMP: NORMAL
VANCOMYCIN SERPL-MCNC: 9.3 UG/ML

## 2023-09-18 PROCEDURE — 2580000003 HC RX 258: Performed by: STUDENT IN AN ORGANIZED HEALTH CARE EDUCATION/TRAINING PROGRAM

## 2023-09-18 PROCEDURE — 6360000002 HC RX W HCPCS

## 2023-09-18 PROCEDURE — 82565 ASSAY OF CREATININE: CPT

## 2023-09-18 PROCEDURE — 6360000002 HC RX W HCPCS: Performed by: STUDENT IN AN ORGANIZED HEALTH CARE EDUCATION/TRAINING PROGRAM

## 2023-09-18 PROCEDURE — 1200000000 HC SEMI PRIVATE

## 2023-09-18 PROCEDURE — 80202 ASSAY OF VANCOMYCIN: CPT

## 2023-09-18 PROCEDURE — 2580000003 HC RX 258

## 2023-09-18 PROCEDURE — 6370000000 HC RX 637 (ALT 250 FOR IP): Performed by: STUDENT IN AN ORGANIZED HEALTH CARE EDUCATION/TRAINING PROGRAM

## 2023-09-18 PROCEDURE — 6370000000 HC RX 637 (ALT 250 FOR IP): Performed by: PSYCHIATRY & NEUROLOGY

## 2023-09-18 PROCEDURE — 87636 SARSCOV2 & INF A&B AMP PRB: CPT

## 2023-09-18 PROCEDURE — 36415 COLL VENOUS BLD VENIPUNCTURE: CPT

## 2023-09-18 RX ORDER — FOLIC ACID 1 MG/1
1 TABLET ORAL DAILY
Status: DISCONTINUED | OUTPATIENT
Start: 2023-09-18 | End: 2023-09-27 | Stop reason: HOSPADM

## 2023-09-18 RX ORDER — PRAZOSIN HYDROCHLORIDE 1 MG/1
1 CAPSULE ORAL NIGHTLY
Status: DISCONTINUED | OUTPATIENT
Start: 2023-09-18 | End: 2023-09-27 | Stop reason: HOSPADM

## 2023-09-18 RX ADMIN — IBUPROFEN 200 MG: 200 TABLET, FILM COATED ORAL at 14:47

## 2023-09-18 RX ADMIN — CEFEPIME 2000 MG: 2 INJECTION, POWDER, FOR SOLUTION INTRAVENOUS at 00:03

## 2023-09-18 RX ADMIN — HYDROXYZINE HYDROCHLORIDE 50 MG: 25 TABLET, FILM COATED ORAL at 10:03

## 2023-09-18 RX ADMIN — SODIUM CHLORIDE, PRESERVATIVE FREE 10 ML: 5 INJECTION INTRAVENOUS at 21:02

## 2023-09-18 RX ADMIN — SODIUM CHLORIDE, PRESERVATIVE FREE 10 ML: 5 INJECTION INTRAVENOUS at 16:20

## 2023-09-18 RX ADMIN — CEFEPIME 2000 MG: 2 INJECTION, POWDER, FOR SOLUTION INTRAVENOUS at 16:14

## 2023-09-18 RX ADMIN — PANTOPRAZOLE SODIUM 40 MG: 40 TABLET, DELAYED RELEASE ORAL at 09:17

## 2023-09-18 RX ADMIN — OXYCODONE HYDROCHLORIDE 10 MG: 5 TABLET ORAL at 05:50

## 2023-09-18 RX ADMIN — OXYCODONE HYDROCHLORIDE 10 MG: 5 TABLET ORAL at 14:47

## 2023-09-18 RX ADMIN — KETOROLAC TROMETHAMINE 15 MG: 30 INJECTION, SOLUTION INTRAMUSCULAR; INTRAVENOUS at 04:35

## 2023-09-18 RX ADMIN — VANCOMYCIN HYDROCHLORIDE 1000 MG: 1 INJECTION, POWDER, LYOPHILIZED, FOR SOLUTION INTRAVENOUS at 00:03

## 2023-09-18 RX ADMIN — GABAPENTIN 1200 MG: 300 CAPSULE ORAL at 21:00

## 2023-09-18 RX ADMIN — GABAPENTIN 1200 MG: 300 CAPSULE ORAL at 09:17

## 2023-09-18 RX ADMIN — BACLOFEN 10 MG: 10 TABLET ORAL at 16:14

## 2023-09-18 RX ADMIN — BACLOFEN 10 MG: 10 TABLET ORAL at 09:18

## 2023-09-18 RX ADMIN — OXYCODONE HYDROCHLORIDE 10 MG: 5 TABLET ORAL at 01:49

## 2023-09-18 RX ADMIN — OXYCODONE HYDROCHLORIDE 10 MG: 5 TABLET ORAL at 21:09

## 2023-09-18 RX ADMIN — CEFEPIME 2000 MG: 2 INJECTION, POWDER, FOR SOLUTION INTRAVENOUS at 23:42

## 2023-09-18 RX ADMIN — PRAZOSIN HYDROCHLORIDE 1 MG: 1 CAPSULE ORAL at 21:01

## 2023-09-18 RX ADMIN — IBUPROFEN 200 MG: 200 TABLET, FILM COATED ORAL at 23:10

## 2023-09-18 RX ADMIN — ONDANSETRON HYDROCHLORIDE 4 MG: 2 INJECTION INTRAMUSCULAR; INTRAVENOUS at 04:34

## 2023-09-18 RX ADMIN — BACLOFEN 10 MG: 10 TABLET ORAL at 21:01

## 2023-09-18 RX ADMIN — DULOXETINE HYDROCHLORIDE 60 MG: 60 CAPSULE, DELAYED RELEASE ORAL at 09:17

## 2023-09-18 RX ADMIN — Medication 1 CAPSULE: at 09:17

## 2023-09-18 RX ADMIN — WATER 1 MG: 1 INJECTION INTRAMUSCULAR; INTRAVENOUS; SUBCUTANEOUS at 08:18

## 2023-09-18 RX ADMIN — PROPRANOLOL HYDROCHLORIDE 60 MG: 60 CAPSULE, EXTENDED RELEASE ORAL at 09:17

## 2023-09-18 RX ADMIN — OXYCODONE HYDROCHLORIDE 10 MG: 5 TABLET ORAL at 10:03

## 2023-09-18 RX ADMIN — VANCOMYCIN HYDROCHLORIDE 1000 MG: 1 INJECTION, POWDER, LYOPHILIZED, FOR SOLUTION INTRAVENOUS at 16:14

## 2023-09-18 RX ADMIN — GABAPENTIN 1200 MG: 300 CAPSULE ORAL at 14:47

## 2023-09-18 RX ADMIN — HEPARIN 300 UNITS: 100 SYRINGE at 04:36

## 2023-09-18 RX ADMIN — IBUPROFEN 200 MG: 200 TABLET, FILM COATED ORAL at 09:18

## 2023-09-18 RX ADMIN — FOLIC ACID 1 MG: 1 TABLET ORAL at 10:03

## 2023-09-18 RX ADMIN — IBUPROFEN 200 MG: 200 TABLET, FILM COATED ORAL at 00:03

## 2023-09-18 ASSESSMENT — PAIN SCALES - GENERAL
PAINLEVEL_OUTOF10: 9
PAINLEVEL_OUTOF10: 8
PAINLEVEL_OUTOF10: 9
PAINLEVEL_OUTOF10: 8
PAINLEVEL_OUTOF10: 8
PAINLEVEL_OUTOF10: 9
PAINLEVEL_OUTOF10: 8
PAINLEVEL_OUTOF10: 0
PAINLEVEL_OUTOF10: 9
PAINLEVEL_OUTOF10: 0
PAINLEVEL_OUTOF10: 8

## 2023-09-18 ASSESSMENT — PAIN DESCRIPTION - ORIENTATION
ORIENTATION: RIGHT;LEFT;ANTERIOR;POSTERIOR
ORIENTATION: LOWER
ORIENTATION: LOWER
ORIENTATION: RIGHT;LEFT;ANTERIOR;POSTERIOR
ORIENTATION: LOWER
ORIENTATION: RIGHT;LEFT;ANTERIOR;POSTERIOR

## 2023-09-18 ASSESSMENT — PAIN DESCRIPTION - LOCATION
LOCATION: BACK
LOCATION: GENERALIZED
LOCATION: GENERALIZED
LOCATION: BACK
LOCATION: GENERALIZED

## 2023-09-18 ASSESSMENT — PAIN DESCRIPTION - DESCRIPTORS
DESCRIPTORS: ACHING

## 2023-09-18 NOTE — BSMART NOTE
Initial BSMART Liaison Assessment Form     Section I - Integrated Summary    Chief Complaint \"Seroquel is giving me nightmares\". LOS:  5     Presenting problem/Summary: Addy Marie is a 37 yo female with a history of IV substance use disorder, tobacco use, hep C, MDD and TAMMIE who is currently admitted to [address] [place] [address] medical [facility] for treatment of L4-L5 discitis. Patient states she has been sober for 10 years. Patient reports that she tends not to come to the hospital due to previous medical staff treatment once staff find out that she was an addict in the past. Patient reports answering questions honestly only to have issues. She reports her  made her come to get help. Currently reports difficulty sleeping, was prescribed Seroquel ti help with sleep but it caused her to experience night terrors. Struggles with chronic anxiety. Patient has a history of PTSD from childhood trauma. Reports moods are otherwise good and denies any safety concerns. States her family is a strong support for her, has 3 adult children and lives at home with her . Would like help with improving sleep and decreasing anxiety. Dr Moreno Aguayo discussed medication options with patient. Dr Moreno Augayo discussed benefits of prazosin for sleep and specifically that it can help with nightmares related to PTSD as well as provide support for anxiety. Precipitant Factors are: inpatient medical hospitalization, Seroquel causing nightmares. The information is given by the patient. Current Psychiatrist and/or  is Matthew Gutiérrez MD.  Previous Hospitalizations/Treatment: multiple inpatient medical, outpatient psychotherapy    Lethality Assessment:  The potential for suicide is noted by the following;  not noted. The potential for homicide is not noted. The patient has not been a perpetrator of sexual or physical abuse. There are not pending charges. The patient is not felt to be at risk for self-harm or harm to others.       Section

## 2023-09-18 NOTE — CONSULTS
PSYCHIATRY CONSULT NOTE:    REASON FOR CONSULT:  anxiety  Seroquel causing night terrors    HISTORY OF PRESENTING COMPLAINT:  Amanda Ventura is a 38 y.o. female with history of IV drug use hep C, cirrhosis, tobacco use, major depressive disorder with generalized anxiety disorder who is admitted initially at [facility] and subsequently transferred to [facility] and treatment of discitis. Admitted to hospital initially with 3 weeks of worsening. Subsequently had MRI of the lumbar spine which demonstrated L4-L5 discitis without drainable fluid status. Amanda Ventura reports having PTSD from childhood trauma. Moods are good. Notes trouble sleeping and she has excessive anxiety. Has tried Seroquel and Mirtazapine with minimal help in the past.  Notes having neuropathic pains as well. Would like help with sleep and anxiety. Appropriately dressed and groomed. Denies SI/HI/AH/VH. Appropriately interactive and aware. Tolerating medications well. Eating fairly.      PAST PSYCHIATRIC HISTORY:  Therapy, Out Patient PTSD prior treatments outpatient    SUBSTANCE ABUSE HISTORY:  Social History     Substance and Sexual Activity   Drug Use No     Social History     Substance and Sexual Activity   Alcohol Use No    Alcohol/week: 0.0 standard drinks of alcohol     Social History     Tobacco Use   Smoking Status Every Day    Packs/day: .5    Types: Cigarettes   Smokeless Tobacco Never       PAST MEDICAL HISTORY:  Past Medical History:   Diagnosis Date    Arthritis     back- DDD    Belching     Cancer ([address])     advanced lesions on cervix, no surgery    Chronic pain     back pain, untreated    Cirrhosis ([address])     Depression     GERD (gastroesophageal reflux disease)     treats with baking soda and water    Headache     Hypercholesterolemia     Ill-defined condition     polycystic ovary disease    Liver disease 2015    Hep C    Nausea & vomiting     Psychiatric disorder     PTSD from childhood

## 2023-09-19 LAB — CREAT SERPL-MCNC: 1.02 MG/DL (ref 0.55–1.02)

## 2023-09-19 PROCEDURE — 36415 COLL VENOUS BLD VENIPUNCTURE: CPT

## 2023-09-19 PROCEDURE — 6360000002 HC RX W HCPCS: Performed by: STUDENT IN AN ORGANIZED HEALTH CARE EDUCATION/TRAINING PROGRAM

## 2023-09-19 PROCEDURE — 6370000000 HC RX 637 (ALT 250 FOR IP): Performed by: STUDENT IN AN ORGANIZED HEALTH CARE EDUCATION/TRAINING PROGRAM

## 2023-09-19 PROCEDURE — 97116 GAIT TRAINING THERAPY: CPT | Performed by: PHYSICAL THERAPIST

## 2023-09-19 PROCEDURE — 6370000000 HC RX 637 (ALT 250 FOR IP): Performed by: PSYCHIATRY & NEUROLOGY

## 2023-09-19 PROCEDURE — 97110 THERAPEUTIC EXERCISES: CPT | Performed by: PHYSICAL THERAPIST

## 2023-09-19 PROCEDURE — 2580000003 HC RX 258: Performed by: STUDENT IN AN ORGANIZED HEALTH CARE EDUCATION/TRAINING PROGRAM

## 2023-09-19 PROCEDURE — 1200000000 HC SEMI PRIVATE

## 2023-09-19 PROCEDURE — 82565 ASSAY OF CREATININE: CPT

## 2023-09-19 RX ADMIN — CEFEPIME 2000 MG: 2 INJECTION, POWDER, FOR SOLUTION INTRAVENOUS at 07:56

## 2023-09-19 RX ADMIN — VANCOMYCIN HYDROCHLORIDE 1250 MG: 10 INJECTION, POWDER, LYOPHILIZED, FOR SOLUTION INTRAVENOUS at 19:41

## 2023-09-19 RX ADMIN — ENOXAPARIN SODIUM 40 MG: 100 INJECTION SUBCUTANEOUS at 10:00

## 2023-09-19 RX ADMIN — PANTOPRAZOLE SODIUM 40 MG: 40 TABLET, DELAYED RELEASE ORAL at 07:03

## 2023-09-19 RX ADMIN — Medication 1 CAPSULE: at 07:58

## 2023-09-19 RX ADMIN — OXYCODONE HYDROCHLORIDE 10 MG: 5 TABLET ORAL at 22:24

## 2023-09-19 RX ADMIN — VANCOMYCIN HYDROCHLORIDE 1250 MG: 10 INJECTION, POWDER, LYOPHILIZED, FOR SOLUTION INTRAVENOUS at 07:03

## 2023-09-19 RX ADMIN — CEFEPIME 2000 MG: 2 INJECTION, POWDER, FOR SOLUTION INTRAVENOUS at 23:14

## 2023-09-19 RX ADMIN — CEFEPIME 2000 MG: 2 INJECTION, POWDER, FOR SOLUTION INTRAVENOUS at 15:37

## 2023-09-19 RX ADMIN — GABAPENTIN 1200 MG: 300 CAPSULE ORAL at 21:08

## 2023-09-19 RX ADMIN — GABAPENTIN 1200 MG: 300 CAPSULE ORAL at 10:01

## 2023-09-19 RX ADMIN — IBUPROFEN 200 MG: 200 TABLET, FILM COATED ORAL at 07:03

## 2023-09-19 RX ADMIN — OXYCODONE HYDROCHLORIDE 10 MG: 5 TABLET ORAL at 16:19

## 2023-09-19 RX ADMIN — FOLIC ACID 1 MG: 1 TABLET ORAL at 08:02

## 2023-09-19 RX ADMIN — PRAZOSIN HYDROCHLORIDE 1 MG: 1 CAPSULE ORAL at 21:09

## 2023-09-19 RX ADMIN — DULOXETINE HYDROCHLORIDE 60 MG: 60 CAPSULE, DELAYED RELEASE ORAL at 07:58

## 2023-09-19 RX ADMIN — OXYCODONE HYDROCHLORIDE 10 MG: 5 TABLET ORAL at 06:08

## 2023-09-19 RX ADMIN — BACLOFEN 10 MG: 10 TABLET ORAL at 14:39

## 2023-09-19 RX ADMIN — IBUPROFEN 200 MG: 200 TABLET, FILM COATED ORAL at 14:39

## 2023-09-19 RX ADMIN — HYDROXYZINE HYDROCHLORIDE 50 MG: 25 TABLET, FILM COATED ORAL at 19:36

## 2023-09-19 RX ADMIN — OXYCODONE HYDROCHLORIDE 10 MG: 5 TABLET ORAL at 10:06

## 2023-09-19 RX ADMIN — BACLOFEN 10 MG: 10 TABLET ORAL at 07:58

## 2023-09-19 RX ADMIN — SODIUM CHLORIDE, PRESERVATIVE FREE 10 ML: 5 INJECTION INTRAVENOUS at 21:14

## 2023-09-19 RX ADMIN — SENNOSIDES AND DOCUSATE SODIUM 2 TABLET: 50; 8.6 TABLET ORAL at 08:00

## 2023-09-19 RX ADMIN — METHOCARBAMOL TABLETS 1000 MG: 500 TABLET, COATED ORAL at 21:13

## 2023-09-19 RX ADMIN — BACLOFEN 10 MG: 10 TABLET ORAL at 21:13

## 2023-09-19 RX ADMIN — OXYCODONE HYDROCHLORIDE 10 MG: 5 TABLET ORAL at 01:21

## 2023-09-19 RX ADMIN — GABAPENTIN 1200 MG: 300 CAPSULE ORAL at 14:39

## 2023-09-19 RX ADMIN — IBUPROFEN 200 MG: 200 TABLET, FILM COATED ORAL at 22:24

## 2023-09-19 ASSESSMENT — PAIN SCALES - GENERAL
PAINLEVEL_OUTOF10: 6
PAINLEVEL_OUTOF10: 7
PAINLEVEL_OUTOF10: 8
PAINLEVEL_OUTOF10: 4
PAINLEVEL_OUTOF10: 7
PAINLEVEL_OUTOF10: 4
PAINLEVEL_OUTOF10: 8
PAINLEVEL_OUTOF10: 4
PAINLEVEL_OUTOF10: 9
PAINLEVEL_OUTOF10: 5
PAINLEVEL_OUTOF10: 9
PAINLEVEL_OUTOF10: 7

## 2023-09-19 ASSESSMENT — PAIN DESCRIPTION - DESCRIPTORS
DESCRIPTORS: ACHING

## 2023-09-19 ASSESSMENT — PAIN - FUNCTIONAL ASSESSMENT
PAIN_FUNCTIONAL_ASSESSMENT: ACTIVITIES ARE NOT PREVENTED

## 2023-09-19 ASSESSMENT — PAIN DESCRIPTION - ORIENTATION
ORIENTATION: LOWER

## 2023-09-19 ASSESSMENT — PAIN DESCRIPTION - ONSET: ONSET: ON-GOING

## 2023-09-19 ASSESSMENT — PAIN DESCRIPTION - LOCATION
LOCATION: BACK

## 2023-09-20 LAB
AEROBIC ID BY SEQUENCING: NORMAL
ALBUMIN SERPL-MCNC: 2.4 G/DL (ref 3.5–5)
ALBUMIN/GLOB SERPL: 0.5 (ref 1.1–2.2)
ALP SERPL-CCNC: 99 U/L (ref 45–117)
ALT SERPL-CCNC: 14 U/L (ref 12–78)
ANION GAP SERPL CALC-SCNC: 6 MMOL/L (ref 5–15)
AST SERPL-CCNC: 20 U/L (ref 15–37)
BASOPHILS # BLD: 0 K/UL (ref 0–0.1)
BASOPHILS NFR BLD: 1 % (ref 0–1)
BILIRUB SERPL-MCNC: 0.2 MG/DL (ref 0.2–1)
BUN SERPL-MCNC: 12 MG/DL (ref 6–20)
BUN/CREAT SERPL: 13 (ref 12–20)
CALCIUM SERPL-MCNC: 8.5 MG/DL (ref 8.5–10.1)
CHLORIDE SERPL-SCNC: 102 MMOL/L (ref 97–108)
CO2 SERPL-SCNC: 30 MMOL/L (ref 21–32)
CREAT SERPL-MCNC: 0.93 MG/DL (ref 0.55–1.02)
CRP SERPL-MCNC: 3.23 MG/DL (ref 0–0.6)
DIFFERENTIAL METHOD BLD: ABNORMAL
EOSINOPHIL # BLD: 0.1 K/UL (ref 0–0.4)
EOSINOPHIL NFR BLD: 4 % (ref 0–7)
ERYTHROCYTE [DISTWIDTH] IN BLOOD BY AUTOMATED COUNT: 12.5 % (ref 11.5–14.5)
ERYTHROCYTE [SEDIMENTATION RATE] IN BLOOD: 72 MM/HR (ref 0–20)
GLOBULIN SER CALC-MCNC: 4.8 G/DL (ref 2–4)
GLUCOSE SERPL-MCNC: 91 MG/DL (ref 65–100)
HCT VFR BLD AUTO: 30.6 % (ref 35–47)
HGB BLD-MCNC: 10.3 G/DL (ref 11.5–16)
IMM GRANULOCYTES # BLD AUTO: 0 K/UL (ref 0–0.04)
IMM GRANULOCYTES NFR BLD AUTO: 1 % (ref 0–0.5)
LYMPHOCYTES # BLD: 1.2 K/UL (ref 0.8–3.5)
LYMPHOCYTES NFR BLD: 34 % (ref 12–49)
MCH RBC QN AUTO: 28.9 PG (ref 26–34)
MCHC RBC AUTO-ENTMCNC: 33.7 G/DL (ref 30–36.5)
MCV RBC AUTO: 86 FL (ref 80–99)
MONOCYTES # BLD: 0.5 K/UL (ref 0–1)
MONOCYTES NFR BLD: 13 % (ref 5–13)
NEUTS SEG # BLD: 1.7 K/UL (ref 1.8–8)
NEUTS SEG NFR BLD: 47 % (ref 32–75)
NRBC # BLD: 0 K/UL (ref 0–0.01)
NRBC BLD-RTO: 0 PER 100 WBC
PLATELET # BLD AUTO: 142 K/UL (ref 150–400)
PMV BLD AUTO: 10.2 FL (ref 8.9–12.9)
POTASSIUM SERPL-SCNC: 4 MMOL/L (ref 3.5–5.1)
PROT SERPL-MCNC: 7.2 G/DL (ref 6.4–8.2)
RBC # BLD AUTO: 3.56 M/UL (ref 3.8–5.2)
SODIUM SERPL-SCNC: 138 MMOL/L (ref 136–145)
SPECIMEN SOURCE: NORMAL
WBC # BLD AUTO: 3.6 K/UL (ref 3.6–11)

## 2023-09-20 PROCEDURE — 6370000000 HC RX 637 (ALT 250 FOR IP): Performed by: STUDENT IN AN ORGANIZED HEALTH CARE EDUCATION/TRAINING PROGRAM

## 2023-09-20 PROCEDURE — 1200000000 HC SEMI PRIVATE

## 2023-09-20 PROCEDURE — 86140 C-REACTIVE PROTEIN: CPT

## 2023-09-20 PROCEDURE — 85025 COMPLETE CBC W/AUTO DIFF WBC: CPT

## 2023-09-20 PROCEDURE — 36415 COLL VENOUS BLD VENIPUNCTURE: CPT

## 2023-09-20 PROCEDURE — 80053 COMPREHEN METABOLIC PANEL: CPT

## 2023-09-20 PROCEDURE — 6360000002 HC RX W HCPCS: Performed by: STUDENT IN AN ORGANIZED HEALTH CARE EDUCATION/TRAINING PROGRAM

## 2023-09-20 PROCEDURE — 2580000003 HC RX 258: Performed by: STUDENT IN AN ORGANIZED HEALTH CARE EDUCATION/TRAINING PROGRAM

## 2023-09-20 PROCEDURE — 6370000000 HC RX 637 (ALT 250 FOR IP): Performed by: PSYCHIATRY & NEUROLOGY

## 2023-09-20 PROCEDURE — 85652 RBC SED RATE AUTOMATED: CPT

## 2023-09-20 RX ORDER — POLYETHYLENE GLYCOL 3350 17 G
2 POWDER IN PACKET (EA) ORAL
Status: DISCONTINUED | OUTPATIENT
Start: 2023-09-20 | End: 2023-09-27 | Stop reason: HOSPADM

## 2023-09-20 RX ORDER — LANOLIN ALCOHOL/MO/W.PET/CERES
CREAM (GRAM) TOPICAL 2 TIMES DAILY
Status: DISPENSED | OUTPATIENT
Start: 2023-09-20 | End: 2023-09-24

## 2023-09-20 RX ADMIN — PRAZOSIN HYDROCHLORIDE 1 MG: 1 CAPSULE ORAL at 21:42

## 2023-09-20 RX ADMIN — VANCOMYCIN HYDROCHLORIDE 1250 MG: 10 INJECTION, POWDER, LYOPHILIZED, FOR SOLUTION INTRAVENOUS at 06:22

## 2023-09-20 RX ADMIN — BACLOFEN 10 MG: 10 TABLET ORAL at 09:09

## 2023-09-20 RX ADMIN — SODIUM CHLORIDE, PRESERVATIVE FREE 10 ML: 5 INJECTION INTRAVENOUS at 21:44

## 2023-09-20 RX ADMIN — PROPRANOLOL HYDROCHLORIDE 60 MG: 60 CAPSULE, EXTENDED RELEASE ORAL at 09:10

## 2023-09-20 RX ADMIN — CEFEPIME 2000 MG: 2 INJECTION, POWDER, FOR SOLUTION INTRAVENOUS at 09:12

## 2023-09-20 RX ADMIN — Medication 1 CAPSULE: at 09:09

## 2023-09-20 RX ADMIN — OXYCODONE HYDROCHLORIDE 10 MG: 5 TABLET ORAL at 21:42

## 2023-09-20 RX ADMIN — OXYCODONE HYDROCHLORIDE 10 MG: 5 TABLET ORAL at 09:56

## 2023-09-20 RX ADMIN — DULOXETINE HYDROCHLORIDE 60 MG: 60 CAPSULE, DELAYED RELEASE ORAL at 09:10

## 2023-09-20 RX ADMIN — FOLIC ACID 1 MG: 1 TABLET ORAL at 09:09

## 2023-09-20 RX ADMIN — SENNOSIDES AND DOCUSATE SODIUM 2 TABLET: 50; 8.6 TABLET ORAL at 09:10

## 2023-09-20 RX ADMIN — BACLOFEN 10 MG: 10 TABLET ORAL at 13:37

## 2023-09-20 RX ADMIN — IBUPROFEN 200 MG: 200 TABLET, FILM COATED ORAL at 15:29

## 2023-09-20 RX ADMIN — VANCOMYCIN HYDROCHLORIDE 1250 MG: 10 INJECTION, POWDER, LYOPHILIZED, FOR SOLUTION INTRAVENOUS at 18:25

## 2023-09-20 RX ADMIN — GABAPENTIN 1200 MG: 300 CAPSULE ORAL at 09:11

## 2023-09-20 RX ADMIN — GABAPENTIN 1200 MG: 300 CAPSULE ORAL at 21:42

## 2023-09-20 RX ADMIN — GABAPENTIN 1200 MG: 300 CAPSULE ORAL at 13:38

## 2023-09-20 RX ADMIN — IBUPROFEN 200 MG: 200 TABLET, FILM COATED ORAL at 06:23

## 2023-09-20 RX ADMIN — BACLOFEN 10 MG: 10 TABLET ORAL at 21:42

## 2023-09-20 RX ADMIN — OXYCODONE HYDROCHLORIDE 10 MG: 5 TABLET ORAL at 16:04

## 2023-09-20 RX ADMIN — ENOXAPARIN SODIUM 40 MG: 100 INJECTION SUBCUTANEOUS at 09:13

## 2023-09-20 RX ADMIN — CEFEPIME 2000 MG: 2 INJECTION, POWDER, FOR SOLUTION INTRAVENOUS at 16:05

## 2023-09-20 RX ADMIN — PANTOPRAZOLE SODIUM 40 MG: 40 TABLET, DELAYED RELEASE ORAL at 06:23

## 2023-09-20 RX ADMIN — METHOCARBAMOL TABLETS 1000 MG: 500 TABLET, COATED ORAL at 09:08

## 2023-09-20 RX ADMIN — HYDROXYZINE HYDROCHLORIDE 50 MG: 25 TABLET, FILM COATED ORAL at 19:36

## 2023-09-20 RX ADMIN — ONDANSETRON HYDROCHLORIDE 4 MG: 2 INJECTION INTRAMUSCULAR; INTRAVENOUS at 13:42

## 2023-09-20 RX ADMIN — METHOCARBAMOL TABLETS 1000 MG: 500 TABLET, COATED ORAL at 19:36

## 2023-09-20 RX ADMIN — SODIUM CHLORIDE, PRESERVATIVE FREE 10 ML: 5 INJECTION INTRAVENOUS at 09:14

## 2023-09-20 RX ADMIN — OXYCODONE HYDROCHLORIDE 10 MG: 5 TABLET ORAL at 04:27

## 2023-09-20 ASSESSMENT — PAIN DESCRIPTION - DESCRIPTORS
DESCRIPTORS: ACHING
DESCRIPTORS: SHOOTING;ACHING
DESCRIPTORS: ACHING;STABBING;THROBBING
DESCRIPTORS: SHOOTING
DESCRIPTORS: ACHING;SHOOTING

## 2023-09-20 ASSESSMENT — PAIN DESCRIPTION - ORIENTATION
ORIENTATION: LOWER
ORIENTATION: LOWER;POSTERIOR
ORIENTATION: POSTERIOR;LOWER
ORIENTATION: LOWER;POSTERIOR
ORIENTATION: LOWER
ORIENTATION: POSTERIOR;LOWER
ORIENTATION: LOWER

## 2023-09-20 ASSESSMENT — PAIN SCALES - GENERAL
PAINLEVEL_OUTOF10: 9
PAINLEVEL_OUTOF10: 8
PAINLEVEL_OUTOF10: 7
PAINLEVEL_OUTOF10: 9
PAINLEVEL_OUTOF10: 8
PAINLEVEL_OUTOF10: 8

## 2023-09-20 ASSESSMENT — PAIN DESCRIPTION - LOCATION
LOCATION: BACK

## 2023-09-20 ASSESSMENT — PAIN - FUNCTIONAL ASSESSMENT
PAIN_FUNCTIONAL_ASSESSMENT: ACTIVITIES ARE NOT PREVENTED

## 2023-09-21 LAB
BACTERIA ISLT: ABNORMAL
OTHER ANTIBIOTIC SUSC ISLT: ABNORMAL
SOURCE: ABNORMAL
SPECIMEN SOURCE: ABNORMAL

## 2023-09-21 PROCEDURE — 6370000000 HC RX 637 (ALT 250 FOR IP): Performed by: PSYCHIATRY & NEUROLOGY

## 2023-09-21 PROCEDURE — 6360000002 HC RX W HCPCS: Performed by: STUDENT IN AN ORGANIZED HEALTH CARE EDUCATION/TRAINING PROGRAM

## 2023-09-21 PROCEDURE — 1200000000 HC SEMI PRIVATE

## 2023-09-21 PROCEDURE — 6370000000 HC RX 637 (ALT 250 FOR IP): Performed by: INTERNAL MEDICINE

## 2023-09-21 PROCEDURE — 6370000000 HC RX 637 (ALT 250 FOR IP): Performed by: STUDENT IN AN ORGANIZED HEALTH CARE EDUCATION/TRAINING PROGRAM

## 2023-09-21 PROCEDURE — 2580000003 HC RX 258: Performed by: STUDENT IN AN ORGANIZED HEALTH CARE EDUCATION/TRAINING PROGRAM

## 2023-09-21 RX ADMIN — Medication 1 CAPSULE: at 08:15

## 2023-09-21 RX ADMIN — VANCOMYCIN HYDROCHLORIDE 1250 MG: 10 INJECTION, POWDER, LYOPHILIZED, FOR SOLUTION INTRAVENOUS at 18:46

## 2023-09-21 RX ADMIN — VANCOMYCIN HYDROCHLORIDE 1250 MG: 10 INJECTION, POWDER, LYOPHILIZED, FOR SOLUTION INTRAVENOUS at 06:49

## 2023-09-21 RX ADMIN — IBUPROFEN 200 MG: 200 TABLET, FILM COATED ORAL at 00:01

## 2023-09-21 RX ADMIN — CEFEPIME 2000 MG: 2 INJECTION, POWDER, FOR SOLUTION INTRAVENOUS at 15:24

## 2023-09-21 RX ADMIN — BACLOFEN 10 MG: 10 TABLET ORAL at 08:14

## 2023-09-21 RX ADMIN — NICOTINE POLACRILEX 2 MG: 2 LOZENGE ORAL at 15:24

## 2023-09-21 RX ADMIN — CEFEPIME 2000 MG: 2 INJECTION, POWDER, FOR SOLUTION INTRAVENOUS at 08:13

## 2023-09-21 RX ADMIN — GABAPENTIN 1200 MG: 300 CAPSULE ORAL at 14:30

## 2023-09-21 RX ADMIN — METHOCARBAMOL TABLETS 1000 MG: 500 TABLET, COATED ORAL at 20:54

## 2023-09-21 RX ADMIN — GABAPENTIN 1200 MG: 300 CAPSULE ORAL at 20:55

## 2023-09-21 RX ADMIN — BACLOFEN 10 MG: 10 TABLET ORAL at 20:55

## 2023-09-21 RX ADMIN — BACLOFEN 10 MG: 10 TABLET ORAL at 14:35

## 2023-09-21 RX ADMIN — OXYCODONE HYDROCHLORIDE 10 MG: 5 TABLET ORAL at 11:45

## 2023-09-21 RX ADMIN — IBUPROFEN 200 MG: 200 TABLET, FILM COATED ORAL at 23:14

## 2023-09-21 RX ADMIN — FOLIC ACID 1 MG: 1 TABLET ORAL at 08:14

## 2023-09-21 RX ADMIN — ENOXAPARIN SODIUM 40 MG: 100 INJECTION SUBCUTANEOUS at 08:13

## 2023-09-21 RX ADMIN — DULOXETINE HYDROCHLORIDE 60 MG: 60 CAPSULE, DELAYED RELEASE ORAL at 08:14

## 2023-09-21 RX ADMIN — HYDROXYZINE HYDROCHLORIDE 50 MG: 25 TABLET, FILM COATED ORAL at 20:54

## 2023-09-21 RX ADMIN — PANTOPRAZOLE SODIUM 40 MG: 40 TABLET, DELAYED RELEASE ORAL at 06:50

## 2023-09-21 RX ADMIN — PRAZOSIN HYDROCHLORIDE 1 MG: 1 CAPSULE ORAL at 20:55

## 2023-09-21 RX ADMIN — IBUPROFEN 200 MG: 200 TABLET, FILM COATED ORAL at 06:50

## 2023-09-21 RX ADMIN — GABAPENTIN 1200 MG: 300 CAPSULE ORAL at 08:15

## 2023-09-21 RX ADMIN — OXYCODONE HYDROCHLORIDE 10 MG: 5 TABLET ORAL at 05:42

## 2023-09-21 RX ADMIN — NICOTINE POLACRILEX 2 MG: 2 LOZENGE ORAL at 11:45

## 2023-09-21 RX ADMIN — OXYCODONE HYDROCHLORIDE 10 MG: 5 TABLET ORAL at 17:57

## 2023-09-21 RX ADMIN — SODIUM CHLORIDE, PRESERVATIVE FREE 10 ML: 5 INJECTION INTRAVENOUS at 20:56

## 2023-09-21 RX ADMIN — METHOCARBAMOL TABLETS 1000 MG: 500 TABLET, COATED ORAL at 05:41

## 2023-09-21 RX ADMIN — SODIUM CHLORIDE, PRESERVATIVE FREE 10 ML: 5 INJECTION INTRAVENOUS at 08:16

## 2023-09-21 RX ADMIN — HYDROXYZINE HYDROCHLORIDE 50 MG: 25 TABLET, FILM COATED ORAL at 15:24

## 2023-09-21 RX ADMIN — IBUPROFEN 200 MG: 200 TABLET, FILM COATED ORAL at 14:35

## 2023-09-21 RX ADMIN — CEFEPIME 2000 MG: 2 INJECTION, POWDER, FOR SOLUTION INTRAVENOUS at 00:02

## 2023-09-21 ASSESSMENT — PAIN SCALES - GENERAL
PAINLEVEL_OUTOF10: 8
PAINLEVEL_OUTOF10: 9
PAINLEVEL_OUTOF10: 7
PAINLEVEL_OUTOF10: 8
PAINLEVEL_OUTOF10: 8
PAINLEVEL_OUTOF10: 6
PAINLEVEL_OUTOF10: 8
PAINLEVEL_OUTOF10: 8

## 2023-09-21 ASSESSMENT — PAIN DESCRIPTION - ORIENTATION
ORIENTATION: LOWER

## 2023-09-21 ASSESSMENT — PAIN DESCRIPTION - LOCATION
LOCATION: BACK

## 2023-09-21 ASSESSMENT — PAIN DESCRIPTION - DESCRIPTORS
DESCRIPTORS: ACHING

## 2023-09-21 NOTE — CARE COORDINATION
09/21/23 1411   Readmission Assessment   Number of Days since last admission? 1-7 days  (Patient was a planned discharge from [facility] and planned admission to [facility])   Previous Disposition Other (comment)  (Patient was a planned discharge from [facility] and planned admission to [facility])   Who is being Interviewed Patient  (Patient was a planned discharge from [facility] and planned admission to [facility])   What was the patient's/caregiver's perception as to why they think they needed to return back to the hospital?   (Patient was a planned discharge from [facility] and planned admission to [facility])   Did you visit your Primary Care Physician after you left the hospital, before you returned this time? No  (Patient was a planned discharge from [facility] and planned admission to [facility])   Why weren't you able to visit your PCP? Other (Comment)  (Patient was a planned discharge from [facility] and planned admission to [facility])   Did you see a specialist, such as Cardiac, Pulmonary, Orthopedic Physician, etc. after you left the hospital? No  (Patient was a planned discharge from [facility] and planned admission to [facility])   Who advised the patient to return to the hospital? Other (Comment)  (Patient was a planned discharge from [facility] and planned admission to [address] [place] [address])   Does the patient report anything that got in the way of taking their medications? No  (Patient was a planned discharge from [facility] and planned admission to [facility])   In our efforts to provide the best possible care to you and others like you, can you think of anything that we could have done to help you after you left the hospital the first time, so that you might not have needed to return so soon?  Other (Comment)  (Patient was a planned discharge from [facility] and planned admission to [facility])     Darden Sicard RN CM

## 2023-09-22 LAB
AMPICILLIN MIC: NORMAL UG/ML
BACTERIA SPEC: NORMAL
CREAT SERPL-MCNC: 0.91 MG/DL (ref 0.55–1.02)
ERYTHROMYCIN MIC: NORMAL UG/ML
GENTAMICIN MIC: NORMAL UG/ML
PENICILLIN MIC: NORMAL UG/ML
RIFAMPIN MIC: NORMAL UG/ML
TETRACYCLINE MIC: NORMAL UG/ML
VANCOMYCIN MIC: NORMAL UG/ML

## 2023-09-22 PROCEDURE — 82565 ASSAY OF CREATININE: CPT

## 2023-09-22 PROCEDURE — 2580000003 HC RX 258: Performed by: STUDENT IN AN ORGANIZED HEALTH CARE EDUCATION/TRAINING PROGRAM

## 2023-09-22 PROCEDURE — 6370000000 HC RX 637 (ALT 250 FOR IP): Performed by: INTERNAL MEDICINE

## 2023-09-22 PROCEDURE — 6370000000 HC RX 637 (ALT 250 FOR IP): Performed by: STUDENT IN AN ORGANIZED HEALTH CARE EDUCATION/TRAINING PROGRAM

## 2023-09-22 PROCEDURE — 36415 COLL VENOUS BLD VENIPUNCTURE: CPT

## 2023-09-22 PROCEDURE — 1200000000 HC SEMI PRIVATE

## 2023-09-22 PROCEDURE — 6360000002 HC RX W HCPCS: Performed by: STUDENT IN AN ORGANIZED HEALTH CARE EDUCATION/TRAINING PROGRAM

## 2023-09-22 PROCEDURE — 6370000000 HC RX 637 (ALT 250 FOR IP): Performed by: PSYCHIATRY & NEUROLOGY

## 2023-09-22 RX ADMIN — CEFEPIME 2000 MG: 2 INJECTION, POWDER, FOR SOLUTION INTRAVENOUS at 00:05

## 2023-09-22 RX ADMIN — PANTOPRAZOLE SODIUM 40 MG: 40 TABLET, DELAYED RELEASE ORAL at 08:14

## 2023-09-22 RX ADMIN — GABAPENTIN 1200 MG: 300 CAPSULE ORAL at 22:28

## 2023-09-22 RX ADMIN — CEFEPIME 2000 MG: 2 INJECTION, POWDER, FOR SOLUTION INTRAVENOUS at 08:15

## 2023-09-22 RX ADMIN — OXYCODONE HYDROCHLORIDE 10 MG: 5 TABLET ORAL at 17:58

## 2023-09-22 RX ADMIN — IBUPROFEN 200 MG: 200 TABLET, FILM COATED ORAL at 22:28

## 2023-09-22 RX ADMIN — GABAPENTIN 1200 MG: 300 CAPSULE ORAL at 08:14

## 2023-09-22 RX ADMIN — Medication 1 CAPSULE: at 08:14

## 2023-09-22 RX ADMIN — PROPRANOLOL HYDROCHLORIDE 60 MG: 60 CAPSULE, EXTENDED RELEASE ORAL at 08:14

## 2023-09-22 RX ADMIN — DULOXETINE HYDROCHLORIDE 60 MG: 60 CAPSULE, DELAYED RELEASE ORAL at 08:14

## 2023-09-22 RX ADMIN — SODIUM CHLORIDE, PRESERVATIVE FREE 10 ML: 5 INJECTION INTRAVENOUS at 22:30

## 2023-09-22 RX ADMIN — ENOXAPARIN SODIUM 40 MG: 100 INJECTION SUBCUTANEOUS at 08:15

## 2023-09-22 RX ADMIN — Medication: at 22:37

## 2023-09-22 RX ADMIN — PRAZOSIN HYDROCHLORIDE 1 MG: 1 CAPSULE ORAL at 22:29

## 2023-09-22 RX ADMIN — BACLOFEN 10 MG: 10 TABLET ORAL at 08:14

## 2023-09-22 RX ADMIN — CEFEPIME 2000 MG: 2 INJECTION, POWDER, FOR SOLUTION INTRAVENOUS at 16:01

## 2023-09-22 RX ADMIN — OXYCODONE HYDROCHLORIDE 10 MG: 5 TABLET ORAL at 12:05

## 2023-09-22 RX ADMIN — VANCOMYCIN HYDROCHLORIDE 1250 MG: 10 INJECTION, POWDER, LYOPHILIZED, FOR SOLUTION INTRAVENOUS at 17:58

## 2023-09-22 RX ADMIN — OXYCODONE HYDROCHLORIDE 10 MG: 5 TABLET ORAL at 06:01

## 2023-09-22 RX ADMIN — IBUPROFEN 200 MG: 200 TABLET, FILM COATED ORAL at 14:30

## 2023-09-22 RX ADMIN — FOLIC ACID 1 MG: 1 TABLET ORAL at 08:14

## 2023-09-22 RX ADMIN — BACLOFEN 10 MG: 10 TABLET ORAL at 14:30

## 2023-09-22 RX ADMIN — OXYCODONE HYDROCHLORIDE 10 MG: 5 TABLET ORAL at 00:05

## 2023-09-22 RX ADMIN — IBUPROFEN 200 MG: 200 TABLET, FILM COATED ORAL at 08:14

## 2023-09-22 RX ADMIN — SODIUM CHLORIDE, PRESERVATIVE FREE 10 ML: 5 INJECTION INTRAVENOUS at 08:15

## 2023-09-22 RX ADMIN — BACLOFEN 10 MG: 10 TABLET ORAL at 22:28

## 2023-09-22 RX ADMIN — GABAPENTIN 1200 MG: 300 CAPSULE ORAL at 14:30

## 2023-09-22 RX ADMIN — VANCOMYCIN HYDROCHLORIDE 1250 MG: 10 INJECTION, POWDER, LYOPHILIZED, FOR SOLUTION INTRAVENOUS at 06:01

## 2023-09-22 ASSESSMENT — PAIN DESCRIPTION - ORIENTATION
ORIENTATION: LOWER

## 2023-09-22 ASSESSMENT — PAIN DESCRIPTION - DESCRIPTORS
DESCRIPTORS: ACHING

## 2023-09-22 ASSESSMENT — PAIN SCALES - GENERAL
PAINLEVEL_OUTOF10: 8
PAINLEVEL_OUTOF10: 5
PAINLEVEL_OUTOF10: 6

## 2023-09-22 ASSESSMENT — PAIN DESCRIPTION - LOCATION
LOCATION: BACK

## 2023-09-23 ENCOUNTER — APPOINTMENT (OUTPATIENT)
Facility: HOSPITAL | Age: 43
DRG: 347 | End: 2023-09-23
Attending: STUDENT IN AN ORGANIZED HEALTH CARE EDUCATION/TRAINING PROGRAM
Payer: MEDICAID

## 2023-09-23 LAB
APPEARANCE UR: CLEAR
BACTERIA URNS QL MICRO: NEGATIVE /HPF
BILIRUB UR QL: NEGATIVE
COLOR UR: ABNORMAL
EPITH CASTS URNS QL MICRO: ABNORMAL /LPF
GLUCOSE UR STRIP.AUTO-MCNC: NEGATIVE MG/DL
HGB UR QL STRIP: ABNORMAL
KETONES UR QL STRIP.AUTO: NEGATIVE MG/DL
LEUKOCYTE ESTERASE UR QL STRIP.AUTO: NEGATIVE
NITRITE UR QL STRIP.AUTO: NEGATIVE
PH UR STRIP: 5.5 (ref 5–8)
PROT UR STRIP-MCNC: ABNORMAL MG/DL
RBC #/AREA URNS HPF: ABNORMAL /HPF (ref 0–5)
SARS-COV-2 RNA RESP QL NAA+PROBE: NOT DETECTED
SOURCE: NORMAL
SP GR UR REFRACTOMETRY: 1.01
URINE CULTURE IF INDICATED: ABNORMAL
UROBILINOGEN UR QL STRIP.AUTO: 1 EU/DL (ref 0.2–1)
WBC URNS QL MICRO: ABNORMAL /HPF (ref 0–4)

## 2023-09-23 PROCEDURE — 71046 X-RAY EXAM CHEST 2 VIEWS: CPT

## 2023-09-23 PROCEDURE — 2580000003 HC RX 258: Performed by: STUDENT IN AN ORGANIZED HEALTH CARE EDUCATION/TRAINING PROGRAM

## 2023-09-23 PROCEDURE — 6360000002 HC RX W HCPCS: Performed by: STUDENT IN AN ORGANIZED HEALTH CARE EDUCATION/TRAINING PROGRAM

## 2023-09-23 PROCEDURE — 87040 BLOOD CULTURE FOR BACTERIA: CPT

## 2023-09-23 PROCEDURE — 6370000000 HC RX 637 (ALT 250 FOR IP): Performed by: INTERNAL MEDICINE

## 2023-09-23 PROCEDURE — 1200000000 HC SEMI PRIVATE

## 2023-09-23 PROCEDURE — 81001 URINALYSIS AUTO W/SCOPE: CPT

## 2023-09-23 PROCEDURE — 6370000000 HC RX 637 (ALT 250 FOR IP): Performed by: STUDENT IN AN ORGANIZED HEALTH CARE EDUCATION/TRAINING PROGRAM

## 2023-09-23 PROCEDURE — 87635 SARS-COV-2 COVID-19 AMP PRB: CPT

## 2023-09-23 RX ADMIN — NICOTINE POLACRILEX 2 MG: 2 LOZENGE ORAL at 11:45

## 2023-09-23 RX ADMIN — BACLOFEN 10 MG: 10 TABLET ORAL at 20:08

## 2023-09-23 RX ADMIN — CEFEPIME 2000 MG: 2 INJECTION, POWDER, FOR SOLUTION INTRAVENOUS at 00:03

## 2023-09-23 RX ADMIN — Medication: at 20:16

## 2023-09-23 RX ADMIN — VANCOMYCIN HYDROCHLORIDE 1250 MG: 10 INJECTION, POWDER, LYOPHILIZED, FOR SOLUTION INTRAVENOUS at 05:09

## 2023-09-23 RX ADMIN — GABAPENTIN 1200 MG: 300 CAPSULE ORAL at 08:10

## 2023-09-23 RX ADMIN — GABAPENTIN 1200 MG: 300 CAPSULE ORAL at 13:13

## 2023-09-23 RX ADMIN — IBUPROFEN 200 MG: 200 TABLET, FILM COATED ORAL at 15:38

## 2023-09-23 RX ADMIN — Medication 1 CAPSULE: at 08:10

## 2023-09-23 RX ADMIN — BACLOFEN 10 MG: 10 TABLET ORAL at 13:13

## 2023-09-23 RX ADMIN — OXYCODONE HYDROCHLORIDE 10 MG: 5 TABLET ORAL at 13:13

## 2023-09-23 RX ADMIN — PANTOPRAZOLE SODIUM 40 MG: 40 TABLET, DELAYED RELEASE ORAL at 08:10

## 2023-09-23 RX ADMIN — IBUPROFEN 200 MG: 200 TABLET, FILM COATED ORAL at 23:06

## 2023-09-23 RX ADMIN — OXYCODONE HYDROCHLORIDE 10 MG: 5 TABLET ORAL at 00:04

## 2023-09-23 RX ADMIN — PROPRANOLOL HYDROCHLORIDE 60 MG: 60 CAPSULE, EXTENDED RELEASE ORAL at 08:10

## 2023-09-23 RX ADMIN — DULOXETINE HYDROCHLORIDE 60 MG: 60 CAPSULE, DELAYED RELEASE ORAL at 08:10

## 2023-09-23 RX ADMIN — IBUPROFEN 200 MG: 200 TABLET, FILM COATED ORAL at 08:10

## 2023-09-23 RX ADMIN — FOLIC ACID 1 MG: 1 TABLET ORAL at 08:10

## 2023-09-23 RX ADMIN — ONDANSETRON HYDROCHLORIDE 4 MG: 2 INJECTION INTRAMUSCULAR; INTRAVENOUS at 05:06

## 2023-09-23 RX ADMIN — GABAPENTIN 1200 MG: 300 CAPSULE ORAL at 20:08

## 2023-09-23 RX ADMIN — ENOXAPARIN SODIUM 40 MG: 100 INJECTION SUBCUTANEOUS at 08:10

## 2023-09-23 RX ADMIN — VANCOMYCIN HYDROCHLORIDE 1250 MG: 10 INJECTION, POWDER, LYOPHILIZED, FOR SOLUTION INTRAVENOUS at 17:38

## 2023-09-23 RX ADMIN — Medication: at 08:11

## 2023-09-23 RX ADMIN — OXYCODONE HYDROCHLORIDE 10 MG: 5 TABLET ORAL at 07:04

## 2023-09-23 RX ADMIN — SODIUM CHLORIDE, PRESERVATIVE FREE 10 ML: 5 INJECTION INTRAVENOUS at 20:10

## 2023-09-23 RX ADMIN — SODIUM CHLORIDE, PRESERVATIVE FREE 10 ML: 5 INJECTION INTRAVENOUS at 08:11

## 2023-09-23 RX ADMIN — OXYCODONE HYDROCHLORIDE 10 MG: 5 TABLET ORAL at 20:08

## 2023-09-23 RX ADMIN — BACLOFEN 10 MG: 10 TABLET ORAL at 08:10

## 2023-09-23 RX ADMIN — CEFEPIME 2000 MG: 2 INJECTION, POWDER, FOR SOLUTION INTRAVENOUS at 08:09

## 2023-09-23 RX ADMIN — CEFEPIME 2000 MG: 2 INJECTION, POWDER, FOR SOLUTION INTRAVENOUS at 15:38

## 2023-09-23 ASSESSMENT — PAIN DESCRIPTION - FREQUENCY
FREQUENCY: CONTINUOUS
FREQUENCY: CONTINUOUS

## 2023-09-23 ASSESSMENT — PAIN SCALES - GENERAL
PAINLEVEL_OUTOF10: 8
PAINLEVEL_OUTOF10: 9
PAINLEVEL_OUTOF10: 9
PAINLEVEL_OUTOF10: 7
PAINLEVEL_OUTOF10: 9
PAINLEVEL_OUTOF10: 8
PAINLEVEL_OUTOF10: 6
PAINLEVEL_OUTOF10: 4

## 2023-09-23 ASSESSMENT — PAIN DESCRIPTION - DESCRIPTORS
DESCRIPTORS: ACHING
DESCRIPTORS: ACHING;STABBING;THROBBING
DESCRIPTORS: ACHING;THROBBING;STABBING
DESCRIPTORS: ACHING

## 2023-09-23 ASSESSMENT — PAIN DESCRIPTION - PAIN TYPE
TYPE: CHRONIC PAIN
TYPE: CHRONIC PAIN

## 2023-09-23 ASSESSMENT — PAIN DESCRIPTION - LOCATION
LOCATION: BACK

## 2023-09-23 ASSESSMENT — PAIN DESCRIPTION - ORIENTATION
ORIENTATION: LOWER

## 2023-09-24 LAB
ALBUMIN SERPL-MCNC: 2.5 G/DL (ref 3.5–5)
ALBUMIN/GLOB SERPL: 0.5 (ref 1.1–2.2)
ALP SERPL-CCNC: 107 U/L (ref 45–117)
ALT SERPL-CCNC: 22 U/L (ref 12–78)
ANION GAP SERPL CALC-SCNC: 10 MMOL/L (ref 5–15)
AST SERPL-CCNC: 26 U/L (ref 15–37)
BASOPHILS # BLD: 0 K/UL (ref 0–0.1)
BASOPHILS NFR BLD: 1 % (ref 0–1)
BILIRUB SERPL-MCNC: 0.3 MG/DL (ref 0.2–1)
BUN SERPL-MCNC: 11 MG/DL (ref 6–20)
BUN/CREAT SERPL: 12 (ref 12–20)
CALCIUM SERPL-MCNC: 8.4 MG/DL (ref 8.5–10.1)
CHLORIDE SERPL-SCNC: 101 MMOL/L (ref 97–108)
CO2 SERPL-SCNC: 27 MMOL/L (ref 21–32)
CREAT SERPL-MCNC: 0.91 MG/DL (ref 0.55–1.02)
DIFFERENTIAL METHOD BLD: ABNORMAL
EOSINOPHIL # BLD: 0.1 K/UL (ref 0–0.4)
EOSINOPHIL NFR BLD: 2 % (ref 0–7)
ERYTHROCYTE [DISTWIDTH] IN BLOOD BY AUTOMATED COUNT: 12.9 % (ref 11.5–14.5)
GLOBULIN SER CALC-MCNC: 5.1 G/DL (ref 2–4)
GLUCOSE SERPL-MCNC: 114 MG/DL (ref 65–100)
HCT VFR BLD AUTO: 32.8 % (ref 35–47)
HGB BLD-MCNC: 10.8 G/DL (ref 11.5–16)
IMM GRANULOCYTES # BLD AUTO: 0 K/UL (ref 0–0.04)
IMM GRANULOCYTES NFR BLD AUTO: 1 % (ref 0–0.5)
LYMPHOCYTES # BLD: 0.6 K/UL (ref 0.8–3.5)
LYMPHOCYTES NFR BLD: 17 % (ref 12–49)
MCH RBC QN AUTO: 28.8 PG (ref 26–34)
MCHC RBC AUTO-ENTMCNC: 32.9 G/DL (ref 30–36.5)
MCV RBC AUTO: 87.5 FL (ref 80–99)
MONOCYTES # BLD: 0.3 K/UL (ref 0–1)
MONOCYTES NFR BLD: 7 % (ref 5–13)
NEUTS SEG # BLD: 2.8 K/UL (ref 1.8–8)
NEUTS SEG NFR BLD: 72 % (ref 32–75)
NRBC # BLD: 0 K/UL (ref 0–0.01)
NRBC BLD-RTO: 0 PER 100 WBC
PLATELET # BLD AUTO: 123 K/UL (ref 150–400)
PMV BLD AUTO: 10.4 FL (ref 8.9–12.9)
POTASSIUM SERPL-SCNC: 3.7 MMOL/L (ref 3.5–5.1)
PROT SERPL-MCNC: 7.6 G/DL (ref 6.4–8.2)
RBC # BLD AUTO: 3.75 M/UL (ref 3.8–5.2)
RBC MORPH BLD: ABNORMAL
SODIUM SERPL-SCNC: 138 MMOL/L (ref 136–145)
WBC # BLD AUTO: 3.8 K/UL (ref 3.6–11)

## 2023-09-24 PROCEDURE — 85025 COMPLETE CBC W/AUTO DIFF WBC: CPT

## 2023-09-24 PROCEDURE — 6360000002 HC RX W HCPCS: Performed by: STUDENT IN AN ORGANIZED HEALTH CARE EDUCATION/TRAINING PROGRAM

## 2023-09-24 PROCEDURE — 6370000000 HC RX 637 (ALT 250 FOR IP): Performed by: PSYCHIATRY & NEUROLOGY

## 2023-09-24 PROCEDURE — 6370000000 HC RX 637 (ALT 250 FOR IP): Performed by: INTERNAL MEDICINE

## 2023-09-24 PROCEDURE — 6370000000 HC RX 637 (ALT 250 FOR IP): Performed by: STUDENT IN AN ORGANIZED HEALTH CARE EDUCATION/TRAINING PROGRAM

## 2023-09-24 PROCEDURE — 36415 COLL VENOUS BLD VENIPUNCTURE: CPT

## 2023-09-24 PROCEDURE — 80053 COMPREHEN METABOLIC PANEL: CPT

## 2023-09-24 PROCEDURE — 1200000000 HC SEMI PRIVATE

## 2023-09-24 PROCEDURE — 2580000003 HC RX 258: Performed by: STUDENT IN AN ORGANIZED HEALTH CARE EDUCATION/TRAINING PROGRAM

## 2023-09-24 RX ADMIN — OXYCODONE HYDROCHLORIDE 10 MG: 5 TABLET ORAL at 13:53

## 2023-09-24 RX ADMIN — OXYCODONE HYDROCHLORIDE 10 MG: 5 TABLET ORAL at 07:46

## 2023-09-24 RX ADMIN — METHOCARBAMOL TABLETS 1000 MG: 500 TABLET, COATED ORAL at 18:29

## 2023-09-24 RX ADMIN — SODIUM CHLORIDE, PRESERVATIVE FREE 10 ML: 5 INJECTION INTRAVENOUS at 21:55

## 2023-09-24 RX ADMIN — PROPRANOLOL HYDROCHLORIDE 60 MG: 60 CAPSULE, EXTENDED RELEASE ORAL at 10:38

## 2023-09-24 RX ADMIN — BACLOFEN 10 MG: 10 TABLET ORAL at 10:33

## 2023-09-24 RX ADMIN — BACLOFEN 10 MG: 10 TABLET ORAL at 15:17

## 2023-09-24 RX ADMIN — SODIUM CHLORIDE, PRESERVATIVE FREE 10 ML: 5 INJECTION INTRAVENOUS at 10:46

## 2023-09-24 RX ADMIN — Medication 1 CAPSULE: at 10:35

## 2023-09-24 RX ADMIN — ENOXAPARIN SODIUM 40 MG: 100 INJECTION SUBCUTANEOUS at 10:33

## 2023-09-24 RX ADMIN — ONDANSETRON HYDROCHLORIDE 4 MG: 2 INJECTION INTRAMUSCULAR; INTRAVENOUS at 18:35

## 2023-09-24 RX ADMIN — PRAZOSIN HYDROCHLORIDE 1 MG: 1 CAPSULE ORAL at 20:15

## 2023-09-24 RX ADMIN — CEFEPIME 2000 MG: 2 INJECTION, POWDER, FOR SOLUTION INTRAVENOUS at 00:27

## 2023-09-24 RX ADMIN — IBUPROFEN 200 MG: 200 TABLET, FILM COATED ORAL at 23:45

## 2023-09-24 RX ADMIN — GABAPENTIN 1200 MG: 300 CAPSULE ORAL at 20:15

## 2023-09-24 RX ADMIN — CEFEPIME 2000 MG: 2 INJECTION, POWDER, FOR SOLUTION INTRAVENOUS at 16:54

## 2023-09-24 RX ADMIN — FOLIC ACID 1 MG: 1 TABLET ORAL at 10:33

## 2023-09-24 RX ADMIN — VANCOMYCIN HYDROCHLORIDE 1250 MG: 10 INJECTION, POWDER, LYOPHILIZED, FOR SOLUTION INTRAVENOUS at 06:48

## 2023-09-24 RX ADMIN — VANCOMYCIN HYDROCHLORIDE 1250 MG: 10 INJECTION, POWDER, LYOPHILIZED, FOR SOLUTION INTRAVENOUS at 18:29

## 2023-09-24 RX ADMIN — GABAPENTIN 1200 MG: 300 CAPSULE ORAL at 15:17

## 2023-09-24 RX ADMIN — IBUPROFEN 200 MG: 200 TABLET, FILM COATED ORAL at 07:47

## 2023-09-24 RX ADMIN — GABAPENTIN 1200 MG: 300 CAPSULE ORAL at 10:33

## 2023-09-24 RX ADMIN — BACLOFEN 10 MG: 10 TABLET ORAL at 20:15

## 2023-09-24 RX ADMIN — FAMOTIDINE 20 MG: 20 TABLET ORAL at 07:47

## 2023-09-24 RX ADMIN — OXYCODONE HYDROCHLORIDE 10 MG: 5 TABLET ORAL at 02:08

## 2023-09-24 RX ADMIN — SENNOSIDES AND DOCUSATE SODIUM 2 TABLET: 50; 8.6 TABLET ORAL at 10:34

## 2023-09-24 RX ADMIN — OXYCODONE HYDROCHLORIDE 10 MG: 5 TABLET ORAL at 20:16

## 2023-09-24 RX ADMIN — PANTOPRAZOLE SODIUM 40 MG: 40 TABLET, DELAYED RELEASE ORAL at 07:47

## 2023-09-24 RX ADMIN — CEFEPIME 2000 MG: 2 INJECTION, POWDER, FOR SOLUTION INTRAVENOUS at 10:32

## 2023-09-24 RX ADMIN — HYDROXYZINE HYDROCHLORIDE 50 MG: 25 TABLET, FILM COATED ORAL at 18:29

## 2023-09-24 RX ADMIN — DULOXETINE HYDROCHLORIDE 60 MG: 60 CAPSULE, DELAYED RELEASE ORAL at 10:33

## 2023-09-24 RX ADMIN — IBUPROFEN 200 MG: 200 TABLET, FILM COATED ORAL at 15:17

## 2023-09-24 ASSESSMENT — PAIN DESCRIPTION - LOCATION
LOCATION: BACK

## 2023-09-24 ASSESSMENT — PAIN DESCRIPTION - DESCRIPTORS
DESCRIPTORS: ACHING
DESCRIPTORS: ACHING;THROBBING;STABBING
DESCRIPTORS: ACHING

## 2023-09-24 ASSESSMENT — PAIN DESCRIPTION - ORIENTATION
ORIENTATION: LOWER
ORIENTATION: LOWER
ORIENTATION: MID;LOWER
ORIENTATION: LOWER;MID
ORIENTATION: LOWER

## 2023-09-24 ASSESSMENT — PAIN - FUNCTIONAL ASSESSMENT
PAIN_FUNCTIONAL_ASSESSMENT: PREVENTS OR INTERFERES SOME ACTIVE ACTIVITIES AND ADLS
PAIN_FUNCTIONAL_ASSESSMENT: PREVENTS OR INTERFERES SOME ACTIVE ACTIVITIES AND ADLS
PAIN_FUNCTIONAL_ASSESSMENT: ACTIVITIES ARE NOT PREVENTED
PAIN_FUNCTIONAL_ASSESSMENT: ACTIVITIES ARE NOT PREVENTED

## 2023-09-24 ASSESSMENT — PAIN SCALES - GENERAL
PAINLEVEL_OUTOF10: 7
PAINLEVEL_OUTOF10: 7
PAINLEVEL_OUTOF10: 5
PAINLEVEL_OUTOF10: 8
PAINLEVEL_OUTOF10: 5
PAINLEVEL_OUTOF10: 9
PAINLEVEL_OUTOF10: 8
PAINLEVEL_OUTOF10: 8

## 2023-09-25 LAB
CREAT SERPL-MCNC: 1 MG/DL (ref 0.55–1.02)
VANCOMYCIN SERPL-MCNC: 19.1 UG/ML

## 2023-09-25 PROCEDURE — 6370000000 HC RX 637 (ALT 250 FOR IP): Performed by: STUDENT IN AN ORGANIZED HEALTH CARE EDUCATION/TRAINING PROGRAM

## 2023-09-25 PROCEDURE — 80202 ASSAY OF VANCOMYCIN: CPT

## 2023-09-25 PROCEDURE — 82565 ASSAY OF CREATININE: CPT

## 2023-09-25 PROCEDURE — 6360000002 HC RX W HCPCS: Performed by: STUDENT IN AN ORGANIZED HEALTH CARE EDUCATION/TRAINING PROGRAM

## 2023-09-25 PROCEDURE — 6370000000 HC RX 637 (ALT 250 FOR IP): Performed by: PSYCHIATRY & NEUROLOGY

## 2023-09-25 PROCEDURE — 1200000000 HC SEMI PRIVATE

## 2023-09-25 PROCEDURE — 36415 COLL VENOUS BLD VENIPUNCTURE: CPT

## 2023-09-25 PROCEDURE — 2580000003 HC RX 258: Performed by: STUDENT IN AN ORGANIZED HEALTH CARE EDUCATION/TRAINING PROGRAM

## 2023-09-25 RX ORDER — POLYETHYLENE GLYCOL 3350 17 G/17G
17 POWDER, FOR SOLUTION ORAL DAILY PRN
Status: DISCONTINUED | OUTPATIENT
Start: 2023-09-25 | End: 2023-09-27 | Stop reason: HOSPADM

## 2023-09-25 RX ORDER — PROPRANOLOL HCL 60 MG
60 CAPSULE, EXTENDED RELEASE 24HR ORAL DAILY
Status: DISCONTINUED | OUTPATIENT
Start: 2023-09-25 | End: 2023-09-27 | Stop reason: HOSPADM

## 2023-09-25 RX ADMIN — PRAZOSIN HYDROCHLORIDE 1 MG: 1 CAPSULE ORAL at 20:55

## 2023-09-25 RX ADMIN — OXYCODONE HYDROCHLORIDE 10 MG: 5 TABLET ORAL at 02:50

## 2023-09-25 RX ADMIN — IBUPROFEN 200 MG: 200 TABLET, FILM COATED ORAL at 23:01

## 2023-09-25 RX ADMIN — CEFEPIME 2000 MG: 2 INJECTION, POWDER, FOR SOLUTION INTRAVENOUS at 15:44

## 2023-09-25 RX ADMIN — GABAPENTIN 1200 MG: 300 CAPSULE ORAL at 14:21

## 2023-09-25 RX ADMIN — HYDROXYZINE HYDROCHLORIDE 50 MG: 25 TABLET, FILM COATED ORAL at 18:31

## 2023-09-25 RX ADMIN — ENOXAPARIN SODIUM 40 MG: 100 INJECTION SUBCUTANEOUS at 09:10

## 2023-09-25 RX ADMIN — BACLOFEN 10 MG: 10 TABLET ORAL at 20:55

## 2023-09-25 RX ADMIN — OXYCODONE HYDROCHLORIDE 10 MG: 5 TABLET ORAL at 21:05

## 2023-09-25 RX ADMIN — SODIUM CHLORIDE, PRESERVATIVE FREE 20 ML: 5 INJECTION INTRAVENOUS at 06:05

## 2023-09-25 RX ADMIN — HYDROXYZINE HYDROCHLORIDE 50 MG: 25 TABLET, FILM COATED ORAL at 02:50

## 2023-09-25 RX ADMIN — SENNOSIDES AND DOCUSATE SODIUM 2 TABLET: 50; 8.6 TABLET ORAL at 09:08

## 2023-09-25 RX ADMIN — GABAPENTIN 1200 MG: 300 CAPSULE ORAL at 09:06

## 2023-09-25 RX ADMIN — VANCOMYCIN HYDROCHLORIDE 1250 MG: 10 INJECTION, POWDER, LYOPHILIZED, FOR SOLUTION INTRAVENOUS at 06:10

## 2023-09-25 RX ADMIN — Medication 1 CAPSULE: at 09:06

## 2023-09-25 RX ADMIN — BACLOFEN 10 MG: 10 TABLET ORAL at 14:21

## 2023-09-25 RX ADMIN — SODIUM CHLORIDE, PRESERVATIVE FREE 10 ML: 5 INJECTION INTRAVENOUS at 00:15

## 2023-09-25 RX ADMIN — SODIUM CHLORIDE, PRESERVATIVE FREE 40 ML: 5 INJECTION INTRAVENOUS at 09:10

## 2023-09-25 RX ADMIN — IBUPROFEN 200 MG: 200 TABLET, FILM COATED ORAL at 15:11

## 2023-09-25 RX ADMIN — GABAPENTIN 1200 MG: 300 CAPSULE ORAL at 20:55

## 2023-09-25 RX ADMIN — VANCOMYCIN HYDROCHLORIDE 1000 MG: 1 INJECTION, POWDER, LYOPHILIZED, FOR SOLUTION INTRAVENOUS at 20:55

## 2023-09-25 RX ADMIN — CEFEPIME 2000 MG: 2 INJECTION, POWDER, FOR SOLUTION INTRAVENOUS at 00:15

## 2023-09-25 RX ADMIN — CEFEPIME 2000 MG: 2 INJECTION, POWDER, FOR SOLUTION INTRAVENOUS at 08:18

## 2023-09-25 RX ADMIN — BACLOFEN 10 MG: 10 TABLET ORAL at 09:06

## 2023-09-25 RX ADMIN — DULOXETINE HYDROCHLORIDE 60 MG: 60 CAPSULE, DELAYED RELEASE ORAL at 09:07

## 2023-09-25 RX ADMIN — PANTOPRAZOLE SODIUM 40 MG: 40 TABLET, DELAYED RELEASE ORAL at 07:45

## 2023-09-25 RX ADMIN — OXYCODONE HYDROCHLORIDE 10 MG: 5 TABLET ORAL at 15:11

## 2023-09-25 RX ADMIN — FOLIC ACID 1 MG: 1 TABLET ORAL at 09:06

## 2023-09-25 RX ADMIN — METHOCARBAMOL TABLETS 1000 MG: 500 TABLET, COATED ORAL at 18:31

## 2023-09-25 RX ADMIN — METHOCARBAMOL TABLETS 1000 MG: 500 TABLET, COATED ORAL at 02:50

## 2023-09-25 RX ADMIN — IBUPROFEN 200 MG: 200 TABLET, FILM COATED ORAL at 07:45

## 2023-09-25 RX ADMIN — OXYCODONE HYDROCHLORIDE 10 MG: 5 TABLET ORAL at 09:08

## 2023-09-25 RX ADMIN — SODIUM CHLORIDE, PRESERVATIVE FREE 20 ML: 5 INJECTION INTRAVENOUS at 05:15

## 2023-09-25 ASSESSMENT — PAIN SCALES - GENERAL
PAINLEVEL_OUTOF10: 10
PAINLEVEL_OUTOF10: 8
PAINLEVEL_OUTOF10: 5
PAINLEVEL_OUTOF10: 9
PAINLEVEL_OUTOF10: 0
PAINLEVEL_OUTOF10: 9
PAINLEVEL_OUTOF10: 9
PAINLEVEL_OUTOF10: 8

## 2023-09-25 ASSESSMENT — PAIN DESCRIPTION - FREQUENCY
FREQUENCY: CONTINUOUS
FREQUENCY: CONTINUOUS

## 2023-09-25 ASSESSMENT — PAIN DESCRIPTION - PAIN TYPE
TYPE: CHRONIC PAIN
TYPE: CHRONIC PAIN

## 2023-09-25 ASSESSMENT — PAIN DESCRIPTION - ORIENTATION
ORIENTATION: ANTERIOR
ORIENTATION: LOWER
ORIENTATION: LEFT;RIGHT
ORIENTATION: LOWER
ORIENTATION: LOWER

## 2023-09-25 ASSESSMENT — PAIN DESCRIPTION - DESCRIPTORS
DESCRIPTORS: ACHING;THROBBING;SHARP
DESCRIPTORS: ACHING

## 2023-09-25 ASSESSMENT — PAIN DESCRIPTION - LOCATION
LOCATION: BACK

## 2023-09-26 VITALS
DIASTOLIC BLOOD PRESSURE: 58 MMHG | RESPIRATION RATE: 16 BRPM | HEART RATE: 67 BPM | WEIGHT: 179.6 LBS | SYSTOLIC BLOOD PRESSURE: 104 MMHG | BODY MASS INDEX: 28.19 KG/M2 | TEMPERATURE: 98.2 F | HEIGHT: 67 IN | OXYGEN SATURATION: 94 %

## 2023-09-26 PROCEDURE — 6360000002 HC RX W HCPCS: Performed by: STUDENT IN AN ORGANIZED HEALTH CARE EDUCATION/TRAINING PROGRAM

## 2023-09-26 PROCEDURE — 6370000000 HC RX 637 (ALT 250 FOR IP): Performed by: STUDENT IN AN ORGANIZED HEALTH CARE EDUCATION/TRAINING PROGRAM

## 2023-09-26 PROCEDURE — 1200000000 HC SEMI PRIVATE

## 2023-09-26 PROCEDURE — 2580000003 HC RX 258: Performed by: STUDENT IN AN ORGANIZED HEALTH CARE EDUCATION/TRAINING PROGRAM

## 2023-09-26 PROCEDURE — 6370000000 HC RX 637 (ALT 250 FOR IP): Performed by: INTERNAL MEDICINE

## 2023-09-26 RX ORDER — IBUPROFEN 400 MG/1
400 TABLET ORAL EVERY 8 HOURS
Status: DISCONTINUED | OUTPATIENT
Start: 2023-09-26 | End: 2023-09-27 | Stop reason: HOSPADM

## 2023-09-26 RX ORDER — KETOROLAC TROMETHAMINE 30 MG/ML
15 INJECTION, SOLUTION INTRAMUSCULAR; INTRAVENOUS ONCE
Status: COMPLETED | OUTPATIENT
Start: 2023-09-26 | End: 2023-09-26

## 2023-09-26 RX ADMIN — BACLOFEN 10 MG: 10 TABLET ORAL at 14:15

## 2023-09-26 RX ADMIN — OXYCODONE HYDROCHLORIDE 10 MG: 5 TABLET ORAL at 03:07

## 2023-09-26 RX ADMIN — PROPRANOLOL HYDROCHLORIDE 60 MG: 60 CAPSULE, EXTENDED RELEASE ORAL at 09:21

## 2023-09-26 RX ADMIN — IBUPROFEN 400 MG: 400 TABLET, FILM COATED ORAL at 15:09

## 2023-09-26 RX ADMIN — WATER 0.5 MG: 1 INJECTION INTRAMUSCULAR; INTRAVENOUS; SUBCUTANEOUS at 16:30

## 2023-09-26 RX ADMIN — GABAPENTIN 1200 MG: 300 CAPSULE ORAL at 09:22

## 2023-09-26 RX ADMIN — FOLIC ACID 1 MG: 1 TABLET ORAL at 09:22

## 2023-09-26 RX ADMIN — OXYCODONE HYDROCHLORIDE 5 MG: 5 TABLET ORAL at 15:09

## 2023-09-26 RX ADMIN — VANCOMYCIN HYDROCHLORIDE 1000 MG: 1 INJECTION, POWDER, LYOPHILIZED, FOR SOLUTION INTRAVENOUS at 09:22

## 2023-09-26 RX ADMIN — CEFEPIME 2000 MG: 2 INJECTION, POWDER, FOR SOLUTION INTRAVENOUS at 00:10

## 2023-09-26 RX ADMIN — KETOROLAC TROMETHAMINE 15 MG: 30 INJECTION, SOLUTION INTRAMUSCULAR; INTRAVENOUS at 17:42

## 2023-09-26 RX ADMIN — SODIUM CHLORIDE, PRESERVATIVE FREE 10 ML: 5 INJECTION INTRAVENOUS at 09:26

## 2023-09-26 RX ADMIN — Medication 1 CAPSULE: at 09:21

## 2023-09-26 RX ADMIN — BACLOFEN 10 MG: 10 TABLET ORAL at 09:22

## 2023-09-26 RX ADMIN — DULOXETINE HYDROCHLORIDE 60 MG: 60 CAPSULE, DELAYED RELEASE ORAL at 09:21

## 2023-09-26 RX ADMIN — CEFEPIME 2000 MG: 2 INJECTION, POWDER, FOR SOLUTION INTRAVENOUS at 17:42

## 2023-09-26 RX ADMIN — CEFEPIME 2000 MG: 2 INJECTION, POWDER, FOR SOLUTION INTRAVENOUS at 07:42

## 2023-09-26 RX ADMIN — SODIUM CHLORIDE, PRESERVATIVE FREE 10 ML: 5 INJECTION INTRAVENOUS at 21:25

## 2023-09-26 RX ADMIN — ENOXAPARIN SODIUM 40 MG: 100 INJECTION SUBCUTANEOUS at 09:22

## 2023-09-26 RX ADMIN — METHOCARBAMOL TABLETS 1000 MG: 500 TABLET, COATED ORAL at 10:55

## 2023-09-26 RX ADMIN — PANTOPRAZOLE SODIUM 40 MG: 40 TABLET, DELAYED RELEASE ORAL at 06:33

## 2023-09-26 RX ADMIN — SENNOSIDES AND DOCUSATE SODIUM 2 TABLET: 50; 8.6 TABLET ORAL at 09:22

## 2023-09-26 RX ADMIN — OXYCODONE HYDROCHLORIDE 10 MG: 5 TABLET ORAL at 09:19

## 2023-09-26 RX ADMIN — HYDROXYZINE HYDROCHLORIDE 50 MG: 25 TABLET, FILM COATED ORAL at 03:07

## 2023-09-26 RX ADMIN — ONDANSETRON HYDROCHLORIDE 4 MG: 2 INJECTION INTRAMUSCULAR; INTRAVENOUS at 14:17

## 2023-09-26 RX ADMIN — IBUPROFEN 200 MG: 200 TABLET, FILM COATED ORAL at 06:33

## 2023-09-26 RX ADMIN — GABAPENTIN 1200 MG: 300 CAPSULE ORAL at 14:15

## 2023-09-26 ASSESSMENT — PAIN DESCRIPTION - FREQUENCY
FREQUENCY: CONTINUOUS

## 2023-09-26 ASSESSMENT — PAIN DESCRIPTION - ORIENTATION
ORIENTATION: LOWER
ORIENTATION: RIGHT
ORIENTATION: LOWER
ORIENTATION: LOWER
ORIENTATION: RIGHT;LEFT;LOWER

## 2023-09-26 ASSESSMENT — PAIN DESCRIPTION - PAIN TYPE
TYPE: CHRONIC PAIN

## 2023-09-26 ASSESSMENT — PAIN SCALES - GENERAL
PAINLEVEL_OUTOF10: 8
PAINLEVEL_OUTOF10: 9
PAINLEVEL_OUTOF10: 8
PAINLEVEL_OUTOF10: 9

## 2023-09-26 ASSESSMENT — PAIN DESCRIPTION - DESCRIPTORS
DESCRIPTORS: ACHING
DESCRIPTORS: ACHING
DESCRIPTORS: SORE
DESCRIPTORS: ACHING
DESCRIPTORS: ACHING

## 2023-09-26 ASSESSMENT — PAIN DESCRIPTION - LOCATION
LOCATION: BACK

## 2023-09-26 ASSESSMENT — PAIN - FUNCTIONAL ASSESSMENT: PAIN_FUNCTIONAL_ASSESSMENT: ACTIVITIES ARE NOT PREVENTED

## 2023-09-26 ASSESSMENT — PAIN DESCRIPTION - ONSET: ONSET: ON-GOING

## 2023-09-26 NOTE — PLAN OF CARE
Care plan reviewed.     Problem: Discharge Planning  Goal: Discharge to home or other facility with appropriate resources  Outcome: Progressing  Flowsheets (Taken 9/24/2023 2010)  Discharge to home or other facility with appropriate resources:   Identify barriers to discharge with patient and caregiver   Arrange for needed discharge resources and transportation as appropriate     Problem: Safety - Adult  Goal: Free from fall injury  9/25/2023 0538 by Chon Caicedo RN  Outcome: Progressing  9/24/2023 2037 by Penny Gresham RN  Outcome: Progressing     Problem: ABCDS Injury Assessment  Goal: Absence of physical injury  Outcome: Progressing     Problem: Skin/Tissue Integrity - Adult  Goal: Skin integrity remains intact  9/25/2023 0538 by Chon Caicedo RN  Outcome: Progressing  9/24/2023 2037 by Penny Gresham RN  Outcome: Progressing  Flowsheets (Taken 9/24/2023 2010 by Chon Caicedo RN)  Skin Integrity Remains Intact: Monitor for areas of redness and/or skin breakdown     Problem: Musculoskeletal - Adult  Goal: Return mobility to safest level of function  9/25/2023 0538 by Chon Caicedo RN  Outcome: Progressing  9/24/2023 2037 by Penny Gresham RN  Outcome: Progressing  [address] (Taken 9/24/2023 2010 by Chon Caicedo RN)  Return Mobility to Safest Level of Function: Assess patient stability and activity tolerance for standing, transferring and ambulating with or without assistive devices  Goal: Return ADL status to a safe level of function  Outcome: Progressing  Flowsheets (Taken 9/24/2023 2010)  Return ADL Status to a Safe Level of Function:   Administer medication as ordered   Assess activities of daily living deficits and provide assistive devices as needed     Problem: Gastrointestinal - Adult  Goal: Maintains adequate nutritional intake  9/25/2023 0538 by Chon Caicedo RN  Outcome: Progressing  9/24/2023 2037 by Penny Gresham RN  Outcome: Progressing     Problem: Infection - Adult  Goal: Absence of
Problem: Discharge Planning  Goal: Discharge to home or other facility with appropriate resources  9/21/2023 1938 by Radha Pandya RN  Outcome: Progressing  9/21/2023 0603 by Wily Rivas RN  Outcome: Progressing     Problem: Safety - Adult  Goal: Free from fall injury  9/21/2023 1938 by aRdha Pandya RN  Outcome: Progressing  9/21/2023 0603 by Wily Rivas RN  Outcome: Progressing     Problem: ABCDS Injury Assessment  Goal: Absence of physical injury  Outcome: Progressing     Problem: Skin/Tissue Integrity - Adult  Goal: Skin integrity remains intact  9/21/2023 1938 by Radha Pandya RN  Outcome: Progressing  9/21/2023 0603 by Wily Rivas RN  Outcome: Progressing     Problem: Musculoskeletal - Adult  Goal: Return mobility to safest level of function  9/21/2023 1938 by Radha Pandya RN  Outcome: Progressing  9/21/2023 0603 by Wily Rivas RN  Outcome: Progressing  Goal: Return ADL status to a safe level of function  Outcome: Progressing     Problem: Gastrointestinal - Adult  Goal: Maintains adequate nutritional intake  Outcome: Progressing     Problem: Infection - Adult  Goal: Absence of infection at discharge  Outcome: Progressing     Problem: Pain  Goal: Verbalizes/displays adequate comfort level or baseline comfort level  Outcome: Progressing
Problem: Discharge Planning  Goal: Discharge to home or other facility with appropriate resources  9/22/2023 1515 by Radha Pandya RN  Outcome: Progressing  9/22/2023 0130 by Wily Rivas RN  Outcome: Progressing     Problem: Safety - Adult  Goal: Free from fall injury  9/22/2023 1515 by Radha Pandya RN  Outcome: Progressing  9/22/2023 0130 by Wily Rivas RN  Outcome: Progressing     Problem: ABCDS Injury Assessment  Goal: Absence of physical injury  9/22/2023 1515 by Radha Pandya RN  Outcome: Progressing  9/22/2023 0130 by Wily Rivas RN  Outcome: Progressing     Problem: Skin/Tissue Integrity - Adult  Goal: Skin integrity remains intact  Outcome: Progressing     Problem: Musculoskeletal - Adult  Goal: Return mobility to safest level of function  Outcome: Progressing     Problem: Gastrointestinal - Adult  Goal: Maintains adequate nutritional intake  Outcome: Progressing     Problem: Infection - Adult  Goal: Absence of infection at discharge  Outcome: Progressing     Problem: Pain  Goal: Verbalizes/displays adequate comfort level or baseline comfort level  Outcome: Progressing
Problem: Discharge Planning  Goal: Discharge to home or other facility with appropriate resources  9/25/2023 2023 by Darlene iDop RN  Outcome: Progressing  9/25/2023 1124 by Sherita Jacques RN  Outcome: Progressing     Problem: Safety - Adult  Goal: Free from fall injury  9/25/2023 2023 by Darlene Diop RN  Outcome: Progressing  9/25/2023 1124 by Sherita Jacques RN  Outcome: Progressing     Problem: ABCDS Injury Assessment  Goal: Absence of physical injury  9/25/2023 2023 by Darlene Diop RN  Outcome: Progressing  9/25/2023 1124 by Sherita Jacques RN  Outcome: Progressing     Problem: Skin/Tissue Integrity - Adult  Goal: Skin integrity remains intact  9/25/2023 2023 by Darlene Diop RN  Outcome: Progressing  9/25/2023 1124 by Sherita Jacques RN  Outcome: Progressing     Problem: Musculoskeletal - Adult  Goal: Return mobility to safest level of function  9/25/2023 2023 by Darlene Diop RN  Outcome: Progressing  9/25/2023 1124 by Sherita Jacques RN  Outcome: Progressing  Goal: Return ADL status to a safe level of function  9/25/2023 2023 by Darlene Diop RN  Outcome: Progressing  9/25/2023 1124 by Sherita Jacques RN  Outcome: Progressing     Problem: Gastrointestinal - Adult  Goal: Maintains adequate nutritional intake  9/25/2023 2023 by Darlene Diop RN  Outcome: Progressing  9/25/2023 1124 by Sherita Jacques RN  Outcome: Progressing     Problem: Infection - Adult  Goal: Absence of infection at discharge  9/25/2023 2023 by Darlene Diop RN  Outcome: Progressing  9/25/2023 1124 by Sherita Jacques RN  Outcome: Progressing     Problem: Pain  Goal: Verbalizes/displays adequate comfort level or baseline comfort level  9/25/2023 2023 by Darlene Diop RN  Outcome: Progressing  9/25/2023 1124 by Sherita Jacquse RN  Outcome: Progressing
Problem: Discharge Planning  Goal: Discharge to home or other facility with appropriate resources  Outcome: Progressing     Problem: Safety - Adult  Goal: Free from fall injury  Outcome: Progressing     Problem: ABCDS Injury Assessment  Goal: Absence of physical injury  Outcome: Progressing     Problem: Skin/Tissue Integrity - Adult  Goal: Skin integrity remains intact  Outcome: Progressing     Problem: Musculoskeletal - Adult  Goal: Return mobility to safest level of function  Outcome: Progressing  Goal: Return ADL status to a safe level of function  Outcome: Progressing     Problem: Gastrointestinal - Adult  Goal: Maintains adequate nutritional intake  Outcome: Progressing     Problem: Infection - Adult  Goal: Absence of infection at discharge  Outcome: Progressing     Problem: Pain  Goal: Verbalizes/displays adequate comfort level or baseline comfort level  Outcome: Progressing
Problem: Discharge Planning  Goal: Discharge to home or other facility with appropriate resources  Outcome: Progressing  Flowsheets (Taken 9/16/2023 0900)  Discharge to home or other facility with appropriate resources: Identify barriers to discharge with patient and caregiver     Problem: Safety - Adult  Goal: Free from fall injury  9/16/2023 0939 by Belinda Woods RN  Outcome: Progressing  9/16/2023 0352 by Huy Winter RN  Outcome: Progressing     Problem: ABCDS Injury Assessment  Goal: Absence of physical injury  Outcome: Progressing     Problem: Skin/Tissue Integrity - Adult  Goal: Skin integrity remains intact  Outcome: Progressing  Flowsheets (Taken 9/16/2023 0900)  Skin Integrity Remains Intact: Monitor for areas of redness and/or skin breakdown     Problem: Musculoskeletal - Adult  Goal: Return mobility to safest level of function  Outcome: Progressing  Flowsheets (Taken 9/16/2023 0900)  Return Mobility to Safest Level of Function: Assess patient stability and activity tolerance for standing, transferring and ambulating with or without assistive devices  Goal: Return ADL status to a safe level of function  Outcome: Progressing  Flowsheets (Taken 9/16/2023 0900)  Return ADL Status to a Safe Level of Function: Administer medication as ordered     Problem: Gastrointestinal - Adult  Goal: Maintains adequate nutritional intake  Outcome: Progressing     Problem: Infection - Adult  Goal: Absence of infection at discharge  Outcome: Progressing  Flowsheets (Taken 9/16/2023 0900)  Absence of infection at discharge: Assess and monitor for signs and symptoms of infection     Problem: Pain  Goal: Verbalizes/displays adequate comfort level or baseline comfort level  Outcome: Progressing
Problem: Pain  Goal: Verbalizes/displays adequate comfort level or baseline comfort level  Outcome: Progressing     Problem: Gastrointestinal - Adult  Goal: Maintains adequate nutritional intake  Outcome: Progressing
Problem: Safety - Adult  Goal: Free from fall injury  Outcome: Progressing     Problem: Skin/Tissue Integrity - Adult  Goal: Skin integrity remains intact  Outcome: Progressing     Problem: Musculoskeletal - Adult  Goal: Return mobility to safest level of function  Outcome: Progressing     Problem: Gastrointestinal - Adult  Goal: Maintains adequate nutritional intake  Outcome: Progressing     Problem: Infection - Adult  Goal: Absence of infection at discharge  Outcome: Progressing
ADL status to a safe level of function  Outcome: Progressing  Flowsheets (Taken 9/19/2023 0800)  Return ADL Status to a Safe Level of Function:   Administer medication as ordered   Assess activities of daily living deficits and provide assistive devices as needed   Obtain physical therapy/occupational therapy consults as needed   Assist and instruct patient to increase activity and self care as tolerated     Problem: Gastrointestinal - Adult  Goal: Maintains adequate nutritional intake  Outcome: Progressing  Flowsheets (Taken 9/19/2023 0800)  Maintains adequate nutritional intake:   Monitor percentage of each meal consumed   Identify factors contributing to decreased intake, treat as appropriate   Assist with meals as needed   Monitor intake and output, weight and lab values   Obtain nutritional consult as needed     Problem: Infection - Adult  Goal: Absence of infection at discharge  Outcome: Progressing  Flowsheets (Taken 9/19/2023 0800)  Absence of infection at discharge:   Assess and monitor for signs and symptoms of infection   Monitor lab/diagnostic results   Monitor all insertion sites i.e., indwelling lines, tubes and drains   [place] appropriate cooling/warming therapies per order   Administer medications as ordered   Instruct and encourage patient and family to use good hand hygiene technique   Identify and instruct in appropriate isolation precautions for identified infection/condition     Problem: Pain  Goal: Verbalizes/displays adequate comfort level or baseline comfort level  Outcome: Progressing     Problem: Physical Therapy - Adult  Goal: By Discharge: Performs mobility at highest level of function for planned discharge setting. See evaluation for individualized goals. Description: Description: FUNCTIONAL STATUS PRIOR TO ADMISSION: Patient was independent and active without use of DME.    HOME SUPPORT PRIOR TO ADMISSION: The patient lived with  but did not require assistance.     Physical
Pain Ratin/10   Pain Intervention(s):   nursing notified and addressing    Activity Tolerance:   Good    After treatment:   Patient left in no apparent distress in bed and Call bell within reach    COMMUNICATION/EDUCATION:   The patient's plan of care was discussed with: physical therapist, registered nurse, physician, and     Patient Education  Education Given To: Patient  Education Provided: Role of Therapy;Plan of Care;Transfer Training; Fall Prevention Strategies  Education Provided Comments: log roll, precautions, exercises for in chair during day  Education Method: Verbal;Demonstration; Teach Back  Education Outcome: Verbalized understanding    Thank you for this referral.  Sylvia Sanders, PT  Minutes: 18      Physical Therapy Evaluation Charge Determination   History Examination Presentation Decision-Making   LOW Complexity : Zero comorbidities / personal factors that will impact the outcome / POC LOW Complexity : 1-2 Standardized tests and measures addressing body structure, function, activity limitation and / or participation in recreation  LOW Complexity : Stable, uncomplicated  AM-PAC  LOW    Based on the above components, the patient evaluation is determined to be of the following complexity level: Low

## 2023-09-26 NOTE — BSMART NOTE
BSMART Liaison Team Note     LOS:  13 Days     Patient goal(s) for today: Take medications as prescribed, communicate needs to staff in an appropriate manner, practice self-advocacy and positive thinking  BSMART Liaison team focus/goals: Assess needs, provide education and support, engage in brief therapy session when appropriate    Progress note: Liaison met w/ pt face to face. Pt was received laying in bed upon entering the room. Pt presents in casual appearance w/ euthymic mood, appropriate eye-contact and smiling at times throughout this meeting. When asked how pt is feeling today shrugs stating, \"Ready to go home\". Pt endorses feelings of anxiety that she reports struggling w/ throughout her life d/t PTSD. Pt denies SI/HI/VH/AH; no evidence of psychosis or delusional thoughts. Pt denies any concerns regarding appetite, but notes difficulty sleeping d/t being a \"severe insomniac\". Pt reports being prescribed Minipress and Cymbalta, but does not feel that either medication is helping. Pt declined wanting to meet w/ Dr. Lizarraga Parents again for another psychiatric consultation, but agreed to notify staff if that changes for medication follow-up. Pt discussed being hopeful that she will be able to return home soon and possibly receive oral antibiotics. Liaison provided supportive listening and encouragement for her to discuss this w/ her attending physician, Dr. Elizabeth De La Torre. Pt shares that she is looking forward to seeing her dog, Darius again along w/ her family. Pt thanked liaison for the visit and denies having any questions at this time. Liaison will continue to follow for weekly supportive visits.      Barriers to Discharge: Medical Clearance     Outpatient provider(s):  None Reported  Insurance info/prescription coverage:  [place]    Diagnosis: Per Dr. Malu Leos consult note on 9/18, \"PTSD with anxiety and insomnia, Deferred , Problems related to social environment and Other psychosocial or environmental

## 2023-09-27 NOTE — DISCHARGE SUMMARY
I was not present at time of discharge. Overnight nurse practitioner spoke with patient regarding AMA      From nurse petitioner note    Signed                          Notified by nursing that patient would like to leave AMA. Spoke w/ patient. Counseled patient on benefits of staying - antibiotics, resolution of infection, pain control, etc., and risks of leaving - SEPSIS, hypotension, deterioration, death, etc. Patient verbalized understanding. Patient alert and orientated and able to make decisions for self. Nursing to complete AMA paperwork. PICC to be removed prior to patient leaving. Patient encouraged to return to hospital as needed.  Nursing aware

## 2023-09-27 NOTE — FLOWSHEET NOTE
Notified by nursing that patient would like to leave AMA. Spoke w/ patient. Counseled patient on benefits of staying - antibiotics, resolution of infection, pain control, etc., and risks of leaving - SEPSIS, hypotension, deterioration, death, etc. Patient verbalized understanding. Patient alert and orientated and able to make decisions for self. Nursing to complete AMA paperwork. PICC to be removed prior to patient leaving. Patient encouraged to return to hospital as needed. Nursing aware.

## 2023-09-28 LAB
BACTERIA SPEC CULT: NORMAL
BACTERIA SPEC CULT: NORMAL
SERVICE CMNT-IMP: NORMAL
SERVICE CMNT-IMP: NORMAL

## 2023-10-02 PROBLEM — F41.9 ANXIETY AND DEPRESSION: Status: ACTIVE | Noted: 2023-10-02

## 2023-10-02 PROBLEM — F32.A ANXIETY AND DEPRESSION: Status: ACTIVE | Noted: 2023-10-02

## 2023-11-24 PROCEDURE — 99285 EMERGENCY DEPT VISIT HI MDM: CPT

## 2023-11-25 ENCOUNTER — APPOINTMENT (OUTPATIENT)
Facility: HOSPITAL | Age: 43
End: 2023-11-25
Payer: COMMERCIAL

## 2023-11-25 ENCOUNTER — HOSPITAL ENCOUNTER (EMERGENCY)
Facility: HOSPITAL | Age: 43
Discharge: HOME | End: 2023-11-25
Attending: FAMILY MEDICINE
Payer: COMMERCIAL

## 2023-11-25 ENCOUNTER — HOSPITAL ENCOUNTER (INPATIENT)
Facility: HOSPITAL | Age: 43
LOS: 6 days | Discharge: OTHER INSTITUTION | DRG: 541 | End: 2023-12-01
Attending: STUDENT IN AN ORGANIZED HEALTH CARE EDUCATION/TRAINING PROGRAM | Admitting: INTERNAL MEDICINE
Payer: COMMERCIAL

## 2023-11-25 VITALS
BODY MASS INDEX: 26.68 KG/M2 | SYSTOLIC BLOOD PRESSURE: 133 MMHG | OXYGEN SATURATION: 100 % | RESPIRATION RATE: 16 BRPM | TEMPERATURE: 98.1 F | HEIGHT: 67 IN | HEART RATE: 98 BPM | WEIGHT: 170 LBS | DIASTOLIC BLOOD PRESSURE: 95 MMHG

## 2023-11-25 DIAGNOSIS — M46.26 ACUTE OSTEOMYELITIS OF LUMBAR SPINE (HCC): Primary | ICD-10-CM

## 2023-11-25 DIAGNOSIS — M46.47 DISCITIS OF LUMBOSACRAL REGION: Primary | ICD-10-CM

## 2023-11-25 LAB
ALBUMIN SERPL-MCNC: 3.3 G/DL (ref 3.5–5)
ALBUMIN/GLOB SERPL: 0.6 (ref 1.1–2.2)
ALP SERPL-CCNC: 113 U/L (ref 45–117)
ALT SERPL-CCNC: 22 U/L (ref 12–78)
ANION GAP SERPL CALC-SCNC: 9 MMOL/L (ref 5–15)
APPEARANCE UR: CLEAR
AST SERPL-CCNC: 19 U/L (ref 15–37)
BACTERIA URNS QL MICRO: NEGATIVE /HPF
BASOPHILS # BLD: 0 K/UL (ref 0–0.1)
BASOPHILS NFR BLD: 0 % (ref 0–1)
BILIRUB SERPL-MCNC: 0.4 MG/DL (ref 0.2–1)
BILIRUB UR QL: NEGATIVE
BUN SERPL-MCNC: 9 MG/DL (ref 6–20)
BUN/CREAT SERPL: 11 (ref 12–20)
CALCIUM SERPL-MCNC: 9.5 MG/DL (ref 8.5–10.1)
CHLORIDE SERPL-SCNC: 101 MMOL/L (ref 97–108)
CO2 SERPL-SCNC: 29 MMOL/L (ref 21–32)
COLOR UR: ABNORMAL
CREAT SERPL-MCNC: 0.79 MG/DL (ref 0.55–1.02)
CRP SERPL-MCNC: 9.41 MG/DL (ref 0–0.6)
DIFFERENTIAL METHOD BLD: ABNORMAL
EOSINOPHIL # BLD: 0 K/UL (ref 0–0.4)
EOSINOPHIL NFR BLD: 0 % (ref 0–7)
EPITH CASTS URNS QL MICRO: ABNORMAL /LPF
ERYTHROCYTE [DISTWIDTH] IN BLOOD BY AUTOMATED COUNT: 13.2 % (ref 11.5–14.5)
ERYTHROCYTE [SEDIMENTATION RATE] IN BLOOD: 72 MM/HR (ref 0–20)
ETHANOL SERPL-MCNC: <10 MG/DL (ref 0–0.08)
GLOBULIN SER CALC-MCNC: 5.2 G/DL (ref 2–4)
GLUCOSE SERPL-MCNC: 126 MG/DL (ref 65–100)
GLUCOSE UR STRIP.AUTO-MCNC: NEGATIVE MG/DL
HCT VFR BLD AUTO: 38.9 % (ref 35–47)
HGB BLD-MCNC: 13.4 G/DL (ref 11.5–16)
HGB UR QL STRIP: ABNORMAL
IMM GRANULOCYTES # BLD AUTO: 0 K/UL (ref 0–0.04)
IMM GRANULOCYTES NFR BLD AUTO: 0 % (ref 0–0.5)
KETONES UR QL STRIP.AUTO: NEGATIVE MG/DL
LEUKOCYTE ESTERASE UR QL STRIP.AUTO: NEGATIVE
LYMPHOCYTES # BLD: 1.7 K/UL (ref 0.8–3.5)
LYMPHOCYTES NFR BLD: 14 % (ref 12–49)
MAGNESIUM SERPL-MCNC: 1.8 MG/DL (ref 1.6–2.4)
MCH RBC QN AUTO: 29.1 PG (ref 26–34)
MCHC RBC AUTO-ENTMCNC: 34.4 G/DL (ref 30–36.5)
MCV RBC AUTO: 84.4 FL (ref 80–99)
MONOCYTES # BLD: 0.7 K/UL (ref 0–1)
MONOCYTES NFR BLD: 6 % (ref 5–13)
NEUTS SEG # BLD: 9.9 K/UL (ref 1.8–8)
NEUTS SEG NFR BLD: 80 % (ref 32–75)
NITRITE UR QL STRIP.AUTO: NEGATIVE
NRBC # BLD: 0 K/UL (ref 0–0.01)
NRBC BLD-RTO: 0 PER 100 WBC
PH UR STRIP: 6.5 (ref 5–8)
PLATELET # BLD AUTO: 160 K/UL (ref 150–400)
POTASSIUM SERPL-SCNC: 3.6 MMOL/L (ref 3.5–5.1)
PROCALCITONIN SERPL-MCNC: 1.41 NG/ML
PROT SERPL-MCNC: 8.5 G/DL (ref 6.4–8.2)
PROT UR STRIP-MCNC: NEGATIVE MG/DL
RBC # BLD AUTO: 4.61 M/UL (ref 3.8–5.2)
RBC #/AREA URNS HPF: ABNORMAL /HPF (ref 0–5)
RBC MORPH BLD: ABNORMAL
RBC MORPH BLD: ABNORMAL
SODIUM SERPL-SCNC: 139 MMOL/L (ref 136–145)
SP GR UR REFRACTOMETRY: 1.01 (ref 1–1.03)
URINE CULTURE IF INDICATED: ABNORMAL
UROBILINOGEN UR QL STRIP.AUTO: 0.2 EU/DL (ref 0.2–1)
WBC # BLD AUTO: 12.3 K/UL (ref 3.6–11)
WBC URNS QL MICRO: ABNORMAL /HPF (ref 0–4)

## 2023-11-25 PROCEDURE — 87040 BLOOD CULTURE FOR BACTERIA: CPT

## 2023-11-25 PROCEDURE — 81001 URINALYSIS AUTO W/SCOPE: CPT

## 2023-11-25 PROCEDURE — 83735 ASSAY OF MAGNESIUM: CPT

## 2023-11-25 PROCEDURE — 96374 THER/PROPH/DIAG INJ IV PUSH: CPT

## 2023-11-25 PROCEDURE — 80053 COMPREHEN METABOLIC PANEL: CPT

## 2023-11-25 PROCEDURE — 1100000000 HC RM PRIVATE

## 2023-11-25 PROCEDURE — 2580000003 HC RX 258: Performed by: STUDENT IN AN ORGANIZED HEALTH CARE EDUCATION/TRAINING PROGRAM

## 2023-11-25 PROCEDURE — 6370000000 HC RX 637 (ALT 250 FOR IP): Performed by: INTERNAL MEDICINE

## 2023-11-25 PROCEDURE — 96376 TX/PRO/DX INJ SAME DRUG ADON: CPT

## 2023-11-25 PROCEDURE — 96375 TX/PRO/DX INJ NEW DRUG ADDON: CPT

## 2023-11-25 PROCEDURE — 36415 COLL VENOUS BLD VENIPUNCTURE: CPT

## 2023-11-25 PROCEDURE — 2500000003 HC RX 250 WO HCPCS: Performed by: INTERNAL MEDICINE

## 2023-11-25 PROCEDURE — 84145 PROCALCITONIN (PCT): CPT

## 2023-11-25 PROCEDURE — 85025 COMPLETE CBC W/AUTO DIFF WBC: CPT

## 2023-11-25 PROCEDURE — 82077 ASSAY SPEC XCP UR&BREATH IA: CPT

## 2023-11-25 PROCEDURE — 86140 C-REACTIVE PROTEIN: CPT

## 2023-11-25 PROCEDURE — 74176 CT ABD & PELVIS W/O CONTRAST: CPT

## 2023-11-25 PROCEDURE — 2580000003 HC RX 258: Performed by: FAMILY MEDICINE

## 2023-11-25 PROCEDURE — 6360000002 HC RX W HCPCS: Performed by: INTERNAL MEDICINE

## 2023-11-25 PROCEDURE — 6360000002 HC RX W HCPCS: Performed by: FAMILY MEDICINE

## 2023-11-25 PROCEDURE — 85652 RBC SED RATE AUTOMATED: CPT

## 2023-11-25 PROCEDURE — 2580000003 HC RX 258: Performed by: INTERNAL MEDICINE

## 2023-11-25 RX ORDER — PANTOPRAZOLE SODIUM 40 MG/1
40 TABLET, DELAYED RELEASE ORAL DAILY
Status: DISCONTINUED | OUTPATIENT
Start: 2023-11-25 | End: 2023-12-01 | Stop reason: HOSPADM

## 2023-11-25 RX ORDER — MAGNESIUM SULFATE IN WATER 40 MG/ML
2000 INJECTION, SOLUTION INTRAVENOUS PRN
Status: DISCONTINUED | OUTPATIENT
Start: 2023-11-25 | End: 2023-11-27

## 2023-11-25 RX ORDER — HYDROMORPHONE HYDROCHLORIDE 1 MG/ML
1 INJECTION, SOLUTION INTRAMUSCULAR; INTRAVENOUS; SUBCUTANEOUS EVERY 4 HOURS PRN
Status: DISCONTINUED | OUTPATIENT
Start: 2023-11-25 | End: 2023-11-29

## 2023-11-25 RX ORDER — SODIUM CHLORIDE 0.9 % (FLUSH) 0.9 %
5-40 SYRINGE (ML) INJECTION EVERY 12 HOURS SCHEDULED
Status: DISCONTINUED | OUTPATIENT
Start: 2023-11-25 | End: 2023-11-25 | Stop reason: SDUPTHER

## 2023-11-25 RX ORDER — HYDROMORPHONE HYDROCHLORIDE 1 MG/ML
0.5 INJECTION, SOLUTION INTRAMUSCULAR; INTRAVENOUS; SUBCUTANEOUS EVERY 4 HOURS PRN
Status: DISCONTINUED | OUTPATIENT
Start: 2023-11-25 | End: 2023-11-25

## 2023-11-25 RX ORDER — FENTANYL 75 UG/1
1 PATCH TRANSDERMAL
Status: DISCONTINUED | OUTPATIENT
Start: 2023-11-25 | End: 2023-11-29

## 2023-11-25 RX ORDER — POLYETHYLENE GLYCOL 3350 17 G/17G
17 POWDER, FOR SOLUTION ORAL DAILY PRN
Status: DISCONTINUED | OUTPATIENT
Start: 2023-11-25 | End: 2023-12-01 | Stop reason: HOSPADM

## 2023-11-25 RX ORDER — POTASSIUM CHLORIDE 7.45 MG/ML
10 INJECTION INTRAVENOUS PRN
Status: DISCONTINUED | OUTPATIENT
Start: 2023-11-25 | End: 2023-11-27

## 2023-11-25 RX ORDER — ONDANSETRON 2 MG/ML
4 INJECTION INTRAMUSCULAR; INTRAVENOUS EVERY 6 HOURS PRN
Status: DISCONTINUED | OUTPATIENT
Start: 2023-11-25 | End: 2023-11-25 | Stop reason: SDUPTHER

## 2023-11-25 RX ORDER — TRAMADOL HYDROCHLORIDE 50 MG/1
50 TABLET ORAL EVERY 6 HOURS PRN
Status: DISCONTINUED | OUTPATIENT
Start: 2023-11-25 | End: 2023-11-25

## 2023-11-25 RX ORDER — ENOXAPARIN SODIUM 100 MG/ML
40 INJECTION SUBCUTANEOUS DAILY
Status: DISCONTINUED | OUTPATIENT
Start: 2023-11-25 | End: 2023-12-01 | Stop reason: HOSPADM

## 2023-11-25 RX ORDER — METHOCARBAMOL 500 MG/1
500 TABLET, FILM COATED ORAL 3 TIMES DAILY
Status: DISCONTINUED | OUTPATIENT
Start: 2023-11-25 | End: 2023-12-01 | Stop reason: HOSPADM

## 2023-11-25 RX ORDER — ONDANSETRON 2 MG/ML
4 INJECTION INTRAMUSCULAR; INTRAVENOUS EVERY 6 HOURS PRN
Status: DISCONTINUED | OUTPATIENT
Start: 2023-11-25 | End: 2023-12-01 | Stop reason: HOSPADM

## 2023-11-25 RX ORDER — ONDANSETRON 4 MG/1
4 TABLET, ORALLY DISINTEGRATING ORAL EVERY 8 HOURS PRN
Status: DISCONTINUED | OUTPATIENT
Start: 2023-11-25 | End: 2023-12-01 | Stop reason: HOSPADM

## 2023-11-25 RX ORDER — ONDANSETRON 2 MG/ML
4 INJECTION INTRAMUSCULAR; INTRAVENOUS ONCE
Status: COMPLETED | OUTPATIENT
Start: 2023-11-25 | End: 2023-11-25

## 2023-11-25 RX ORDER — LANOLIN ALCOHOL/MO/W.PET/CERES
3 CREAM (GRAM) TOPICAL NIGHTLY
Status: DISCONTINUED | OUTPATIENT
Start: 2023-11-25 | End: 2023-12-01 | Stop reason: HOSPADM

## 2023-11-25 RX ORDER — POTASSIUM CHLORIDE 1.5 G/1.58G
40 POWDER, FOR SOLUTION ORAL ONCE
Status: DISCONTINUED | OUTPATIENT
Start: 2023-11-25 | End: 2023-11-25

## 2023-11-25 RX ORDER — POTASSIUM CHLORIDE 20 MEQ/1
40 TABLET, EXTENDED RELEASE ORAL PRN
Status: DISCONTINUED | OUTPATIENT
Start: 2023-11-25 | End: 2023-11-27

## 2023-11-25 RX ORDER — ONDANSETRON 4 MG/1
4 TABLET, ORALLY DISINTEGRATING ORAL EVERY 8 HOURS PRN
Status: DISCONTINUED | OUTPATIENT
Start: 2023-11-25 | End: 2023-11-25 | Stop reason: SDUPTHER

## 2023-11-25 RX ORDER — POLYETHYLENE GLYCOL 3350 17 G/17G
17 POWDER, FOR SOLUTION ORAL DAILY PRN
Status: DISCONTINUED | OUTPATIENT
Start: 2023-11-25 | End: 2023-11-25 | Stop reason: SDUPTHER

## 2023-11-25 RX ORDER — SUCRALFATE 1 G/1
1 TABLET ORAL 3 TIMES DAILY
COMMUNITY

## 2023-11-25 RX ORDER — SODIUM CHLORIDE 0.9 % (FLUSH) 0.9 %
5-40 SYRINGE (ML) INJECTION PRN
Status: DISCONTINUED | OUTPATIENT
Start: 2023-11-25 | End: 2023-11-25 | Stop reason: SDUPTHER

## 2023-11-25 RX ORDER — SUCRALFATE 1 G/1
1 TABLET ORAL 3 TIMES DAILY
Status: DISCONTINUED | OUTPATIENT
Start: 2023-11-25 | End: 2023-12-01 | Stop reason: HOSPADM

## 2023-11-25 RX ORDER — SODIUM CHLORIDE 9 MG/ML
INJECTION, SOLUTION INTRAVENOUS PRN
Status: DISCONTINUED | OUTPATIENT
Start: 2023-11-25 | End: 2023-11-25 | Stop reason: SDUPTHER

## 2023-11-25 RX ORDER — SODIUM CHLORIDE 0.9 % (FLUSH) 0.9 %
5-40 SYRINGE (ML) INJECTION EVERY 12 HOURS SCHEDULED
Status: DISCONTINUED | OUTPATIENT
Start: 2023-11-25 | End: 2023-12-01 | Stop reason: HOSPADM

## 2023-11-25 RX ORDER — SODIUM CHLORIDE 0.9 % (FLUSH) 0.9 %
5-40 SYRINGE (ML) INJECTION PRN
Status: DISCONTINUED | OUTPATIENT
Start: 2023-11-25 | End: 2023-12-01 | Stop reason: HOSPADM

## 2023-11-25 RX ORDER — SODIUM CHLORIDE 9 MG/ML
INJECTION, SOLUTION INTRAVENOUS PRN
Status: DISCONTINUED | OUTPATIENT
Start: 2023-11-25 | End: 2023-12-01 | Stop reason: HOSPADM

## 2023-11-25 RX ORDER — METHOCARBAMOL 500 MG/1
1 TABLET, FILM COATED ORAL 3 TIMES DAILY
COMMUNITY
Start: 2023-10-24

## 2023-11-25 RX ORDER — MAGNESIUM SULFATE 1 G/100ML
1000 INJECTION INTRAVENOUS ONCE
Status: COMPLETED | OUTPATIENT
Start: 2023-11-25 | End: 2023-11-25

## 2023-11-25 RX ORDER — PANTOPRAZOLE SODIUM 40 MG/1
1 TABLET, DELAYED RELEASE ORAL DAILY
COMMUNITY

## 2023-11-25 RX ADMIN — METHOCARBAMOL TABLETS 500 MG: 500 TABLET, COATED ORAL at 11:58

## 2023-11-25 RX ADMIN — VANCOMYCIN HYDROCHLORIDE 2000 MG: 10 INJECTION, POWDER, LYOPHILIZED, FOR SOLUTION INTRAVENOUS at 17:23

## 2023-11-25 RX ADMIN — HYDROMORPHONE HYDROCHLORIDE 0.5 MG: 1 INJECTION, SOLUTION INTRAMUSCULAR; INTRAVENOUS; SUBCUTANEOUS at 11:35

## 2023-11-25 RX ADMIN — SODIUM CHLORIDE, PRESERVATIVE FREE 10 ML: 5 INJECTION INTRAVENOUS at 21:10

## 2023-11-25 RX ADMIN — TRAMADOL HYDROCHLORIDE 50 MG: 50 TABLET ORAL at 10:00

## 2023-11-25 RX ADMIN — SUCRALFATE 1 G: 1 TABLET ORAL at 21:10

## 2023-11-25 RX ADMIN — POTASSIUM BICARBONATE 40 MEQ: 782 TABLET, EFFERVESCENT ORAL at 12:05

## 2023-11-25 RX ADMIN — MEROPENEM 1000 MG: 1 INJECTION, POWDER, FOR SOLUTION INTRAVENOUS at 17:24

## 2023-11-25 RX ADMIN — ONDANSETRON 4 MG: 2 INJECTION INTRAMUSCULAR; INTRAVENOUS at 02:25

## 2023-11-25 RX ADMIN — PANTOPRAZOLE SODIUM 40 MG: 40 TABLET, DELAYED RELEASE ORAL at 11:58

## 2023-11-25 RX ADMIN — HYDROMORPHONE HYDROCHLORIDE 0.25 MG: 1 INJECTION, SOLUTION INTRAMUSCULAR; INTRAVENOUS; SUBCUTANEOUS at 05:38

## 2023-11-25 RX ADMIN — SODIUM CHLORIDE: 9 INJECTION, SOLUTION INTRAVENOUS at 11:59

## 2023-11-25 RX ADMIN — TRAMADOL HYDROCHLORIDE 50 MG: 50 TABLET ORAL at 16:17

## 2023-11-25 RX ADMIN — HYDROMORPHONE HYDROCHLORIDE 1 MG: 1 INJECTION, SOLUTION INTRAMUSCULAR; INTRAVENOUS; SUBCUTANEOUS at 02:28

## 2023-11-25 RX ADMIN — METHOCARBAMOL TABLETS 500 MG: 500 TABLET, COATED ORAL at 16:18

## 2023-11-25 RX ADMIN — ENOXAPARIN SODIUM 40 MG: 100 INJECTION SUBCUTANEOUS at 11:57

## 2023-11-25 RX ADMIN — MAGNESIUM SULFATE HEPTAHYDRATE 1000 MG: 1 INJECTION, SOLUTION INTRAVENOUS at 11:59

## 2023-11-25 RX ADMIN — PIPERACILLIN AND TAZOBACTAM 3375 MG: 3; .375 INJECTION, POWDER, LYOPHILIZED, FOR SOLUTION INTRAVENOUS at 05:41

## 2023-11-25 RX ADMIN — HYDROMORPHONE HYDROCHLORIDE 1 MG: 1 INJECTION, SOLUTION INTRAMUSCULAR; INTRAVENOUS; SUBCUTANEOUS at 21:10

## 2023-11-25 RX ADMIN — HYDROMORPHONE HYDROCHLORIDE 1 MG: 1 INJECTION, SOLUTION INTRAMUSCULAR; INTRAVENOUS; SUBCUTANEOUS at 17:21

## 2023-11-25 RX ADMIN — SODIUM CHLORIDE, PRESERVATIVE FREE 10 ML: 5 INJECTION INTRAVENOUS at 09:53

## 2023-11-25 RX ADMIN — METHOCARBAMOL TABLETS 500 MG: 500 TABLET, COATED ORAL at 21:10

## 2023-11-25 RX ADMIN — SUCRALFATE 1 G: 1 TABLET ORAL at 16:18

## 2023-11-25 ASSESSMENT — PAIN DESCRIPTION - LOCATION
LOCATION: BACK

## 2023-11-25 ASSESSMENT — PAIN DESCRIPTION - FREQUENCY: FREQUENCY: CONTINUOUS

## 2023-11-25 ASSESSMENT — PAIN DESCRIPTION - ORIENTATION
ORIENTATION: LOWER
ORIENTATION: MID;LOWER
ORIENTATION: LOWER

## 2023-11-25 ASSESSMENT — LIFESTYLE VARIABLES
HOW MANY STANDARD DRINKS CONTAINING ALCOHOL DO YOU HAVE ON A TYPICAL DAY: PATIENT DOES NOT DRINK
HOW OFTEN DO YOU HAVE A DRINK CONTAINING ALCOHOL: NEVER

## 2023-11-25 ASSESSMENT — PAIN SCALES - GENERAL
PAINLEVEL_OUTOF10: 9
PAINLEVEL_OUTOF10: 0
PAINLEVEL_OUTOF10: 10
PAINLEVEL_OUTOF10: 10
PAINLEVEL_OUTOF10: 0
PAINLEVEL_OUTOF10: 10
PAINLEVEL_OUTOF10: 9
PAINLEVEL_OUTOF10: 10
PAINLEVEL_OUTOF10: 8
PAINLEVEL_OUTOF10: 10
PAINLEVEL_OUTOF10: 10

## 2023-11-25 ASSESSMENT — PAIN - FUNCTIONAL ASSESSMENT
PAIN_FUNCTIONAL_ASSESSMENT: 0-10
PAIN_FUNCTIONAL_ASSESSMENT: 0-10
PAIN_FUNCTIONAL_ASSESSMENT: ACTIVITIES ARE NOT PREVENTED
PAIN_FUNCTIONAL_ASSESSMENT: PREVENTS OR INTERFERES WITH ALL ACTIVE AND SOME PASSIVE ACTIVITIES

## 2023-11-25 ASSESSMENT — PAIN DESCRIPTION - DESCRIPTORS
DESCRIPTORS: STABBING;THROBBING
DESCRIPTORS: STABBING;THROBBING
DESCRIPTORS: SHARP;SHOOTING
DESCRIPTORS: STABBING;THROBBING;PRESSURE
DESCRIPTORS: SHARP;SHOOTING;SORE
DESCRIPTORS: STABBING;THROBBING
DESCRIPTORS: STABBING;THROBBING

## 2023-11-25 ASSESSMENT — PAIN DESCRIPTION - ONSET: ONSET: ON-GOING

## 2023-11-25 ASSESSMENT — PAIN DESCRIPTION - PAIN TYPE
TYPE: CHRONIC PAIN
TYPE: CHRONIC PAIN

## 2023-11-25 NOTE — ED TRIAGE NOTES
Chronic back pain.  Patient states that she last took Gabapentin and ibuprofen 3 hours before arrival. Pain 10/10

## 2023-11-25 NOTE — ED NOTES
TRANSFER - OUT REPORT:    Verbal report given to [facility] RN on Alexandria Shearer  being transferred to [facility]  for routine progression of patient care       Report consisted of patient's Situation, Background, Assessment and   Recommendations(SBAR). Information from the following report(s) ED Encounter Summary was reviewed with the receiving nurse. [place] Fall Assessment:    Presents to emergency department  because of falls (Syncope, seizure, or loss of consciousness): No  Age > 70: No  Altered Mental Status, Intoxication with alcohol or substance confusion (Disorientation, impaired judgment, poor safety awaremess, or inability to follow instructions): No  Impaired Mobility: Ambulates or transfers with assistive devices or assistance; Unable to ambulate or transer.: No  Nursing Judgement: No          Lines:       Opportunity for questions and clarification was provided.       Patient transported with:  [facility] medics           [place], [address] [place] [address]  11/25/23 5859

## 2023-11-25 NOTE — PROGRESS NOTES
This writer called [phone] [facility] dispatch to set up [facility] [address] transport of patient to [place] [address]. Dispatch personal, Flower Josi was given information on patient. Informed that the patient has IVFs, and IV antibiotics infusing. Cardiac monitor in use. ED nurse aware of  time of 20 minutes.

## 2023-11-25 NOTE — ED PROVIDER NOTES
[facility] EMERGENCY DEP  EMERGENCY DEPARTMENT ENCOUNTER       Pt Name: Bessy Banks  MRN: [id number]  [address] [facility] [place] 1980  Date of evaluation: 11/24/2023  Provider: Suzi Reaves MD   PCP: None, None  Note Started: 3:29 AM EST 11/25/23     CHIEF COMPLAINT       Chief Complaint   Patient presents with    Back Pain        HISTORY OF PRESENT ILLNESS: 1 or more elements      History From: Patient  Limitations in obtaining HPI include None     Bessy Banks is a 37 y.o. female who presents via private vehicle complaining of back pain. She was discharged from hospital about 3 weeks ago after being treated for discitis. She left AMA. She had had an extended course of antibiotics and was discharged home on oral antibiotics. She states that since discharge her back is gotten worse. She stated that today her temperature went to 102, pain was excruciating she presents for evaluation. She has had episodic incontinence of urine. She complains of numbness in both lower extremities right greater than left. No recent history of fall. No abdominal pain. No chest pain heaviness pressure or shortness of breath. Patient has a history of IV drug abuse. She states the last time she used was 3 to 4 weeks ago. Nursing Notes were all reviewed and agreed with or any disagreements were addressed in the HPI. REVIEW OF SYSTEMS      Review of Systems     Positives and Pertinent negatives as per HPI.     PAST HISTORY     Past Medical History:  Past Medical History:   Diagnosis Date    Arthritis     back- DDD    Belching     Cancer ([address])     advanced lesions on cervix, no surgery    Chronic pain     back pain, untreated    Cirrhosis ([address])     Depression     GERD (gastroesophageal reflux disease)     treats with baking soda and water    Headache     Hypercholesterolemia     Ill-defined condition     polycystic ovary disease    Liver disease 2015    Hep C    Nausea & vomiting     Psychiatric disorder     PTSD from childhood

## 2023-11-25 NOTE — ED NOTES
Nursing supervisor in to start iv, attempted x 2, unsuccessful.  Md aware      Val Preciado, [place]  11/25/23 [id number]

## 2023-11-26 LAB
ALBUMIN SERPL-MCNC: 2.9 G/DL (ref 3.5–5)
ALBUMIN/GLOB SERPL: 0.6 (ref 1.1–2.2)
ALP SERPL-CCNC: 103 U/L (ref 45–117)
ALT SERPL-CCNC: 18 U/L (ref 12–78)
ANION GAP SERPL CALC-SCNC: 6 MMOL/L (ref 5–15)
AST SERPL-CCNC: 16 U/L (ref 15–37)
BASOPHILS # BLD: 0.1 K/UL (ref 0–0.1)
BASOPHILS NFR BLD: 0 % (ref 0–1)
BILIRUB SERPL-MCNC: 0.7 MG/DL (ref 0.2–1)
BUN SERPL-MCNC: 6 MG/DL (ref 6–20)
BUN/CREAT SERPL: 8 (ref 12–20)
CALCIUM SERPL-MCNC: 9 MG/DL (ref 8.5–10.1)
CHLORIDE SERPL-SCNC: 100 MMOL/L (ref 97–108)
CO2 SERPL-SCNC: 26 MMOL/L (ref 21–32)
CREAT SERPL-MCNC: 0.74 MG/DL (ref 0.55–1.02)
CRP SERPL-MCNC: 11.6 MG/DL (ref 0–0.6)
DIFFERENTIAL METHOD BLD: ABNORMAL
EOSINOPHIL # BLD: 0 K/UL (ref 0–0.4)
EOSINOPHIL NFR BLD: 0 % (ref 0–7)
ERYTHROCYTE [DISTWIDTH] IN BLOOD BY AUTOMATED COUNT: 13.3 % (ref 11.5–14.5)
GLOBULIN SER CALC-MCNC: 5.1 G/DL (ref 2–4)
GLUCOSE BLD STRIP.AUTO-MCNC: 110 MG/DL (ref 65–117)
GLUCOSE SERPL-MCNC: 129 MG/DL (ref 65–100)
HCT VFR BLD AUTO: 37.1 % (ref 35–47)
HGB BLD-MCNC: 12.5 G/DL (ref 11.5–16)
IMM GRANULOCYTES # BLD AUTO: 0.1 K/UL (ref 0–0.04)
IMM GRANULOCYTES NFR BLD AUTO: 1 % (ref 0–0.5)
LYMPHOCYTES # BLD: 1.8 K/UL (ref 0.8–3.5)
LYMPHOCYTES NFR BLD: 14 % (ref 12–49)
MCH RBC QN AUTO: 28.8 PG (ref 26–34)
MCHC RBC AUTO-ENTMCNC: 33.7 G/DL (ref 30–36.5)
MCV RBC AUTO: 85.5 FL (ref 80–99)
MONOCYTES # BLD: 0.9 K/UL (ref 0–1)
MONOCYTES NFR BLD: 7 % (ref 5–13)
NEUTS SEG # BLD: 10.1 K/UL (ref 1.8–8)
NEUTS SEG NFR BLD: 78 % (ref 32–75)
NRBC # BLD: 0 K/UL (ref 0–0.01)
NRBC BLD-RTO: 0 PER 100 WBC
PLATELET # BLD AUTO: 197 K/UL (ref 150–400)
PMV BLD AUTO: 10.2 FL (ref 8.9–12.9)
POTASSIUM SERPL-SCNC: 3.8 MMOL/L (ref 3.5–5.1)
PROCALCITONIN SERPL-MCNC: 1.01 NG/ML
PROT SERPL-MCNC: 8 G/DL (ref 6.4–8.2)
RBC # BLD AUTO: 4.34 M/UL (ref 3.8–5.2)
SERVICE CMNT-IMP: NORMAL
SODIUM SERPL-SCNC: 132 MMOL/L (ref 136–145)
WBC # BLD AUTO: 13 K/UL (ref 3.6–11)

## 2023-11-26 PROCEDURE — 36415 COLL VENOUS BLD VENIPUNCTURE: CPT

## 2023-11-26 PROCEDURE — 85025 COMPLETE CBC W/AUTO DIFF WBC: CPT

## 2023-11-26 PROCEDURE — 2580000003 HC RX 258: Performed by: INTERNAL MEDICINE

## 2023-11-26 PROCEDURE — 2500000003 HC RX 250 WO HCPCS: Performed by: INTERNAL MEDICINE

## 2023-11-26 PROCEDURE — 6370000000 HC RX 637 (ALT 250 FOR IP): Performed by: INTERNAL MEDICINE

## 2023-11-26 PROCEDURE — 80053 COMPREHEN METABOLIC PANEL: CPT

## 2023-11-26 PROCEDURE — 84145 PROCALCITONIN (PCT): CPT

## 2023-11-26 PROCEDURE — 6360000002 HC RX W HCPCS: Performed by: NURSE PRACTITIONER

## 2023-11-26 PROCEDURE — 1100000000 HC RM PRIVATE

## 2023-11-26 PROCEDURE — 6360000002 HC RX W HCPCS: Performed by: INTERNAL MEDICINE

## 2023-11-26 PROCEDURE — 82962 GLUCOSE BLOOD TEST: CPT

## 2023-11-26 PROCEDURE — 86140 C-REACTIVE PROTEIN: CPT

## 2023-11-26 RX ORDER — KETOROLAC TROMETHAMINE 30 MG/ML
30 INJECTION, SOLUTION INTRAMUSCULAR; INTRAVENOUS ONCE
Status: COMPLETED | OUTPATIENT
Start: 2023-11-26 | End: 2023-11-26

## 2023-11-26 RX ADMIN — ENOXAPARIN SODIUM 40 MG: 100 INJECTION SUBCUTANEOUS at 08:25

## 2023-11-26 RX ADMIN — METHOCARBAMOL TABLETS 500 MG: 500 TABLET, COATED ORAL at 20:58

## 2023-11-26 RX ADMIN — HYDROMORPHONE HYDROCHLORIDE 1 MG: 1 INJECTION, SOLUTION INTRAMUSCULAR; INTRAVENOUS; SUBCUTANEOUS at 17:02

## 2023-11-26 RX ADMIN — HYDROMORPHONE HYDROCHLORIDE 1 MG: 1 INJECTION, SOLUTION INTRAMUSCULAR; INTRAVENOUS; SUBCUTANEOUS at 00:26

## 2023-11-26 RX ADMIN — SUCRALFATE 1 G: 1 TABLET ORAL at 08:26

## 2023-11-26 RX ADMIN — MEROPENEM 1000 MG: 1 INJECTION, POWDER, FOR SOLUTION INTRAVENOUS at 17:02

## 2023-11-26 RX ADMIN — HYDROMORPHONE HYDROCHLORIDE 1 MG: 1 INJECTION, SOLUTION INTRAMUSCULAR; INTRAVENOUS; SUBCUTANEOUS at 04:51

## 2023-11-26 RX ADMIN — PANTOPRAZOLE SODIUM 40 MG: 40 TABLET, DELAYED RELEASE ORAL at 08:26

## 2023-11-26 RX ADMIN — MEROPENEM 1000 MG: 1 INJECTION, POWDER, FOR SOLUTION INTRAVENOUS at 00:27

## 2023-11-26 RX ADMIN — HYDROMORPHONE HYDROCHLORIDE 1 MG: 1 INJECTION, SOLUTION INTRAMUSCULAR; INTRAVENOUS; SUBCUTANEOUS at 20:59

## 2023-11-26 RX ADMIN — KETOROLAC TROMETHAMINE 30 MG: 30 INJECTION, SOLUTION INTRAMUSCULAR; INTRAVENOUS at 20:59

## 2023-11-26 RX ADMIN — METHOCARBAMOL TABLETS 500 MG: 500 TABLET, COATED ORAL at 08:25

## 2023-11-26 RX ADMIN — HYDROMORPHONE HYDROCHLORIDE 1 MG: 1 INJECTION, SOLUTION INTRAMUSCULAR; INTRAVENOUS; SUBCUTANEOUS at 12:55

## 2023-11-26 RX ADMIN — MEROPENEM 1000 MG: 1 INJECTION, POWDER, FOR SOLUTION INTRAVENOUS at 08:26

## 2023-11-26 RX ADMIN — SUCRALFATE 1 G: 1 TABLET ORAL at 15:28

## 2023-11-26 RX ADMIN — HYDROMORPHONE HYDROCHLORIDE 1 MG: 1 INJECTION, SOLUTION INTRAMUSCULAR; INTRAVENOUS; SUBCUTANEOUS at 08:26

## 2023-11-26 RX ADMIN — METHOCARBAMOL TABLETS 500 MG: 500 TABLET, COATED ORAL at 15:28

## 2023-11-26 RX ADMIN — VANCOMYCIN HYDROCHLORIDE 1250 MG: 10 INJECTION, POWDER, LYOPHILIZED, FOR SOLUTION INTRAVENOUS at 05:02

## 2023-11-26 RX ADMIN — SUCRALFATE 1 G: 1 TABLET ORAL at 20:59

## 2023-11-26 RX ADMIN — POTASSIUM BICARBONATE 20 MEQ: 782 TABLET, EFFERVESCENT ORAL at 12:56

## 2023-11-26 RX ADMIN — VANCOMYCIN HYDROCHLORIDE 1250 MG: 10 INJECTION, POWDER, LYOPHILIZED, FOR SOLUTION INTRAVENOUS at 17:02

## 2023-11-26 ASSESSMENT — PAIN DESCRIPTION - LOCATION
LOCATION: BACK;LEG
LOCATION: BACK

## 2023-11-26 ASSESSMENT — PAIN - FUNCTIONAL ASSESSMENT
PAIN_FUNCTIONAL_ASSESSMENT: PREVENTS OR INTERFERES SOME ACTIVE ACTIVITIES AND ADLS
PAIN_FUNCTIONAL_ASSESSMENT: ACTIVITIES ARE NOT PREVENTED
PAIN_FUNCTIONAL_ASSESSMENT: PREVENTS OR INTERFERES SOME ACTIVE ACTIVITIES AND ADLS
PAIN_FUNCTIONAL_ASSESSMENT: ACTIVITIES ARE NOT PREVENTED
PAIN_FUNCTIONAL_ASSESSMENT: PREVENTS OR INTERFERES SOME ACTIVE ACTIVITIES AND ADLS

## 2023-11-26 ASSESSMENT — PAIN DESCRIPTION - ORIENTATION
ORIENTATION: RIGHT;LEFT;POSTERIOR
ORIENTATION: LOWER;RIGHT
ORIENTATION: LOWER
ORIENTATION: LOWER

## 2023-11-26 ASSESSMENT — PAIN DESCRIPTION - DESCRIPTORS
DESCRIPTORS: STABBING
DESCRIPTORS: STABBING;THROBBING
DESCRIPTORS: ACHING

## 2023-11-26 ASSESSMENT — PAIN SCALES - GENERAL
PAINLEVEL_OUTOF10: 10
PAINLEVEL_OUTOF10: 9
PAINLEVEL_OUTOF10: 6
PAINLEVEL_OUTOF10: 9
PAINLEVEL_OUTOF10: 7

## 2023-11-26 ASSESSMENT — PAIN DESCRIPTION - PAIN TYPE
TYPE: CHRONIC PAIN
TYPE: ACUTE PAIN
TYPE: CHRONIC PAIN

## 2023-11-26 ASSESSMENT — PAIN DESCRIPTION - FREQUENCY
FREQUENCY: CONTINUOUS
FREQUENCY: CONTINUOUS

## 2023-11-26 ASSESSMENT — PAIN DESCRIPTION - ONSET
ONSET: GRADUAL
ONSET: GRADUAL

## 2023-11-26 NOTE — PROGRESS NOTES
Occupational Therapy note:    Order acknowledged and chart reviewed. Patient cleared for therapy by nursing and received in bed. Patient declined participating in therapy secondary to 10/10 pain. She is in agreement for therapy to check on her tomorrow. Will defer and continue to follow.     Maia Addison, OTR/L

## 2023-11-26 NOTE — PROGRESS NOTES
Attempted to see pt for PT eval. She reported pain was intolerable and 10/10. Declined to work with PT at this time.

## 2023-11-26 NOTE — PROGRESS NOTES
Per shift report pt has been putting warm packs on her fentanyl patch. This RN called pharmacist who states this will increase the absorption of the fentanyl and is not safe. Care of pt transferred to [facility], [address] [place] [address], report given. [facility] will f/u with the fentanyl patch situation.

## 2023-11-26 NOTE — PROGRESS NOTES
Hospitalist Progress Note    NAME: Thai Mcgill   :  1980   MRN:  [id number]            Subjective:     Chief Complaint / Reason for Physician Visit Back pain  Patient seen and evaluated at bedside, currently patient complaining of back pain. Discussed with RN events overnight. Review of Systems:  Symptom Y/N Comments  Symptom Y/N Comments   Fever/Chills Y   Chest Pain N    Poor Appetite Y   Edema N    Cough N   Abdominal Pain N    Sputum N   Joint Pain N    SOB/JAFFE N   Pruritis/Rash N    Nausea/vomit N   Tolerating PT/OT NA    Diarrhea N   Tolerating Diet Y    Constipation N   Other       Could NOT obtain due to:      Objective:     VITALS:   Last 24hrs VS reviewed since prior progress note. Most recent are:  [unfilled]    Intake/Output Summary (Last 24 hours) at 2023 1310  Last data filed at 2023 2114  Gross per 24 hour   Intake --   Output 600 ml   Net -600 ml        PHYSICAL EXAM:  General: Patient appears uncomfortable    EENT:  EOMI. Anicteric sclerae. MMM  Resp:  CTA bilaterally, no wheezing or rales. No accessory muscle use  CV:  Regular  rhythm, s1/s2 no m/r/g No edema  GI:  Soft, Non distended, Non tender. +Bowel sounds  Neurologic:  Alert and oriented X 3, normal speech,   Psych:   Good insight. Not anxious nor agitated  Skin:  No rashes. No jaundice    Procedures: see electronic medical records for all procedures/Xrays and details which were not copied into this note but were reviewed prior to creation of Plan. LABS:  I reviewed today's most current labs and imaging studies. Pertinent labs include:  Recent Labs     23  0330 23  0505   WBC 12.3* 13.0*   HGB 13.4 12.5   HCT 38.9 37.1    197     Recent Labs     23  0330 23  0505    132*   K 3.6 3.8    100   CO2 29 26   BUN 9 6   MG 1.8  --    ALT 22 18       Signed:  Chaparro Wilkins MD    Medical Decision Making:    Labs reviewed by myself   CBC/CMP    Diagnostic data reviewed by myself   EKG telemetry strip consistent with NSR    Toxic drug monitoring    Vancomycin, monitor for GARRET, dosing with pharmacy assistance    Discussed case with   Case Management in IDRs    MDM Discussion: Patient with numerous medical comorbidities, each with increased risk for mortality and morbidity if left untreated. Patient requires medications with high risk of toxicity and need  for intensive monitoring. I have reviewed patient's presenting subjective and objective findings, as well as all laboratory studies, imaging studies, and vital signs to date as well as treatment rendered and patient's response to those treatments. In addition, prior medical, surgical and relevant social and family histories were reviewed. This is dictation was done by dragon, computer voice recognition software. Quite often unanticipated grammatical, syntax, homophones and other interpretive errors or inadvertently transcribed by the computer software. Please excuse errors that have escaped final proofreading. Thank you.       Reviewed most current lab test results and cultures  YES  Reviewed most current radiology test results   YES  Review and summation of old records today    NO  Reviewed patient's current orders and MAR    YES  PMH/SH reviewed - no change compared to H&P          Assessment / Plan:  Lumbar osteomyelitis/discitis-patient presents with above and symptomatology consistent with lumbar osteomyelitis/discitis, concerns for hardware being infected, currently does not meet sepsis criteria at this time  Follow up blood cultures  Trend inflammatory markers  Vancomycin Zosyn for broad-spectrum antibiotic coverage  Infectious disease consult further evaluation  Spine surgery consult appreciated, as long as no alarm signs or symptoms, no surgical intervention needed at this time, continue current IV antibiotics     Hypomagnesemia-follow up repeat magnesium to assess for resolution     Hypokalemia-replete

## 2023-11-27 LAB
ANION GAP SERPL CALC-SCNC: 7 MMOL/L (ref 5–15)
BUN SERPL-MCNC: 8 MG/DL (ref 6–20)
BUN/CREAT SERPL: 13 (ref 12–20)
CALCIUM SERPL-MCNC: 9.1 MG/DL (ref 8.5–10.1)
CHLORIDE SERPL-SCNC: 100 MMOL/L (ref 97–108)
CO2 SERPL-SCNC: 26 MMOL/L (ref 21–32)
CREAT SERPL-MCNC: 0.62 MG/DL (ref 0.55–1.02)
CRP SERPL-MCNC: 12.1 MG/DL (ref 0–0.6)
GLUCOSE SERPL-MCNC: 132 MG/DL (ref 65–100)
MAGNESIUM SERPL-MCNC: 2 MG/DL (ref 1.6–2.4)
POTASSIUM SERPL-SCNC: 3.7 MMOL/L (ref 3.5–5.1)
PROCALCITONIN SERPL-MCNC: 0.59 NG/ML
SODIUM SERPL-SCNC: 133 MMOL/L (ref 136–145)
VANCOMYCIN SERPL-MCNC: 12.6 UG/ML

## 2023-11-27 PROCEDURE — 6360000002 HC RX W HCPCS: Performed by: STUDENT IN AN ORGANIZED HEALTH CARE EDUCATION/TRAINING PROGRAM

## 2023-11-27 PROCEDURE — 80048 BASIC METABOLIC PNL TOTAL CA: CPT

## 2023-11-27 PROCEDURE — 97165 OT EVAL LOW COMPLEX 30 MIN: CPT

## 2023-11-27 PROCEDURE — 1100000000 HC RM PRIVATE

## 2023-11-27 PROCEDURE — 6370000000 HC RX 637 (ALT 250 FOR IP): Performed by: INTERNAL MEDICINE

## 2023-11-27 PROCEDURE — 80202 ASSAY OF VANCOMYCIN: CPT

## 2023-11-27 PROCEDURE — 84145 PROCALCITONIN (PCT): CPT

## 2023-11-27 PROCEDURE — 97535 SELF CARE MNGMENT TRAINING: CPT

## 2023-11-27 PROCEDURE — 6360000002 HC RX W HCPCS: Performed by: INTERNAL MEDICINE

## 2023-11-27 PROCEDURE — 6370000000 HC RX 637 (ALT 250 FOR IP): Performed by: NURSE PRACTITIONER

## 2023-11-27 PROCEDURE — 36415 COLL VENOUS BLD VENIPUNCTURE: CPT

## 2023-11-27 PROCEDURE — 86140 C-REACTIVE PROTEIN: CPT

## 2023-11-27 PROCEDURE — 2580000003 HC RX 258: Performed by: INTERNAL MEDICINE

## 2023-11-27 PROCEDURE — 2500000003 HC RX 250 WO HCPCS: Performed by: INTERNAL MEDICINE

## 2023-11-27 PROCEDURE — 2580000003 HC RX 258: Performed by: STUDENT IN AN ORGANIZED HEALTH CARE EDUCATION/TRAINING PROGRAM

## 2023-11-27 PROCEDURE — 83735 ASSAY OF MAGNESIUM: CPT

## 2023-11-27 RX ORDER — TRAMADOL HYDROCHLORIDE 50 MG/1
50 TABLET ORAL ONCE
Status: COMPLETED | OUTPATIENT
Start: 2023-11-27 | End: 2023-11-27

## 2023-11-27 RX ORDER — KETOROLAC TROMETHAMINE 30 MG/ML
30 INJECTION, SOLUTION INTRAMUSCULAR; INTRAVENOUS EVERY 6 HOURS PRN
Status: DISPENSED | OUTPATIENT
Start: 2023-11-27 | End: 2023-11-30

## 2023-11-27 RX ADMIN — ENOXAPARIN SODIUM 40 MG: 100 INJECTION SUBCUTANEOUS at 09:25

## 2023-11-27 RX ADMIN — ONDANSETRON 4 MG: 2 INJECTION INTRAMUSCULAR; INTRAVENOUS at 03:47

## 2023-11-27 RX ADMIN — SUCRALFATE 1 G: 1 TABLET ORAL at 09:25

## 2023-11-27 RX ADMIN — SODIUM CHLORIDE, PRESERVATIVE FREE 10 ML: 5 INJECTION INTRAVENOUS at 11:39

## 2023-11-27 RX ADMIN — HYDROMORPHONE HYDROCHLORIDE 1 MG: 1 INJECTION, SOLUTION INTRAMUSCULAR; INTRAVENOUS; SUBCUTANEOUS at 00:53

## 2023-11-27 RX ADMIN — MEROPENEM 1000 MG: 1 INJECTION, POWDER, FOR SOLUTION INTRAVENOUS at 00:53

## 2023-11-27 RX ADMIN — HYDROMORPHONE HYDROCHLORIDE 1 MG: 1 INJECTION, SOLUTION INTRAMUSCULAR; INTRAVENOUS; SUBCUTANEOUS at 21:40

## 2023-11-27 RX ADMIN — HYDROMORPHONE HYDROCHLORIDE 1 MG: 1 INJECTION, SOLUTION INTRAMUSCULAR; INTRAVENOUS; SUBCUTANEOUS at 05:00

## 2023-11-27 RX ADMIN — PANTOPRAZOLE SODIUM 40 MG: 40 TABLET, DELAYED RELEASE ORAL at 09:24

## 2023-11-27 RX ADMIN — SUCRALFATE 1 G: 1 TABLET ORAL at 17:16

## 2023-11-27 RX ADMIN — METHOCARBAMOL TABLETS 500 MG: 500 TABLET, COATED ORAL at 20:17

## 2023-11-27 RX ADMIN — MEROPENEM 1000 MG: 1 INJECTION, POWDER, FOR SOLUTION INTRAVENOUS at 17:16

## 2023-11-27 RX ADMIN — HYDROMORPHONE HYDROCHLORIDE 1 MG: 1 INJECTION, SOLUTION INTRAMUSCULAR; INTRAVENOUS; SUBCUTANEOUS at 17:14

## 2023-11-27 RX ADMIN — METHOCARBAMOL TABLETS 500 MG: 500 TABLET, COATED ORAL at 09:25

## 2023-11-27 RX ADMIN — VANCOMYCIN HYDROCHLORIDE 1250 MG: 10 INJECTION, POWDER, LYOPHILIZED, FOR SOLUTION INTRAVENOUS at 18:21

## 2023-11-27 RX ADMIN — HYDROMORPHONE HYDROCHLORIDE 1 MG: 1 INJECTION, SOLUTION INTRAMUSCULAR; INTRAVENOUS; SUBCUTANEOUS at 13:53

## 2023-11-27 RX ADMIN — KETOROLAC TROMETHAMINE 30 MG: 30 INJECTION, SOLUTION INTRAMUSCULAR; INTRAVENOUS at 23:19

## 2023-11-27 RX ADMIN — KETOROLAC TROMETHAMINE 30 MG: 30 INJECTION, SOLUTION INTRAMUSCULAR; INTRAVENOUS at 11:37

## 2023-11-27 RX ADMIN — MEROPENEM 1000 MG: 1 INJECTION, POWDER, FOR SOLUTION INTRAVENOUS at 09:24

## 2023-11-27 RX ADMIN — HYDROMORPHONE HYDROCHLORIDE 1 MG: 1 INJECTION, SOLUTION INTRAMUSCULAR; INTRAVENOUS; SUBCUTANEOUS at 09:25

## 2023-11-27 RX ADMIN — KETOROLAC TROMETHAMINE 30 MG: 30 INJECTION, SOLUTION INTRAMUSCULAR; INTRAVENOUS at 17:15

## 2023-11-27 RX ADMIN — SUCRALFATE 1 G: 1 TABLET ORAL at 20:17

## 2023-11-27 RX ADMIN — VANCOMYCIN HYDROCHLORIDE 1250 MG: 10 INJECTION, POWDER, LYOPHILIZED, FOR SOLUTION INTRAVENOUS at 05:10

## 2023-11-27 RX ADMIN — TRAMADOL HYDROCHLORIDE 50 MG: 50 TABLET ORAL at 03:36

## 2023-11-27 ASSESSMENT — PAIN DESCRIPTION - LOCATION
LOCATION: BACK
LOCATION: BACK
LOCATION: BACK;LEG
LOCATION: BACK

## 2023-11-27 ASSESSMENT — PAIN SCALES - GENERAL
PAINLEVEL_OUTOF10: 10
PAINLEVEL_OUTOF10: 9
PAINLEVEL_OUTOF10: 10
PAINLEVEL_OUTOF10: 7
PAINLEVEL_OUTOF10: 10
PAINLEVEL_OUTOF10: 10
PAINLEVEL_OUTOF10: 7
PAINLEVEL_OUTOF10: 7
PAINLEVEL_OUTOF10: 10
PAINLEVEL_OUTOF10: 10
PAINLEVEL_OUTOF10: 8
PAINLEVEL_OUTOF10: 9
PAINLEVEL_OUTOF10: 10
PAINLEVEL_OUTOF10: 8
PAINLEVEL_OUTOF10: 8

## 2023-11-27 ASSESSMENT — PAIN - FUNCTIONAL ASSESSMENT
PAIN_FUNCTIONAL_ASSESSMENT: PREVENTS OR INTERFERES SOME ACTIVE ACTIVITIES AND ADLS
PAIN_FUNCTIONAL_ASSESSMENT: PREVENTS OR INTERFERES SOME ACTIVE ACTIVITIES AND ADLS
PAIN_FUNCTIONAL_ASSESSMENT: ACTIVITIES ARE NOT PREVENTED
PAIN_FUNCTIONAL_ASSESSMENT: PREVENTS OR INTERFERES SOME ACTIVE ACTIVITIES AND ADLS

## 2023-11-27 ASSESSMENT — PAIN DESCRIPTION - ORIENTATION
ORIENTATION: LOWER
ORIENTATION: RIGHT

## 2023-11-27 ASSESSMENT — PAIN DESCRIPTION - PAIN TYPE
TYPE: ACUTE PAIN
TYPE: ACUTE PAIN

## 2023-11-27 ASSESSMENT — PAIN DESCRIPTION - DESCRIPTORS
DESCRIPTORS: THROBBING
DESCRIPTORS: THROBBING
DESCRIPTORS: ACHING
DESCRIPTORS: THROBBING

## 2023-11-27 ASSESSMENT — PAIN DESCRIPTION - ONSET
ONSET: ON-GOING
ONSET: ON-GOING

## 2023-11-27 ASSESSMENT — PAIN DESCRIPTION - FREQUENCY
FREQUENCY: CONTINUOUS
FREQUENCY: CONTINUOUS

## 2023-11-27 NOTE — PROGRESS NOTES
Physician Progress Note      PATIENT:               Michael Interiano  CSN #:                  [id number]  :                       1980  ADMIT DATE:       2023 7:19 AM  DISCH DATE:  RESPONDING  PROVIDER #:        Randy Alvarado MD          QUERY TEXT:    Pt admitted on  with Lumbar osteomyelitis/discitis noted to have   \"concerns for hardware being infected\". Pt noted to have increased WBC, HR and RR. Please document in the progress notes and discharge summary if you are   evaluating and /or treating any of the following: The medical record reflects the following:    Risk Factors: H&P  \"Lumbar osteomyelitis/discitis-patient presents with   above and symptomatology consistent with lumbar osteomyelitis/discitis,   concerns for hardware being infected\"    Clinical Indicators:  : WBC 12.3, CRP 9.41, Procal 1.41, Temp 100, HR up to 100, RR up to 24  : WBC 13.0, CRP 11.60, Procal 1.01, HR up to 102  preliminary blood cultures obtained on  NGTD     [facility] CT of ABD/pelvis:  Progression of chronic discitis osteomyelitis at   L4-L5 may involve the right L5 interpedicular screw. No evidence of abscess. Treatment: IV Vancomycin and Zosyn, IVF, Infectious disease consult further   evaluation noted on  pn  Options provided:  -- Sepsis due to Lumbar osteomyelitis present on admission  -- Lumbar osteomyelitis without Sepsis  -- Other - I will add my own diagnosis  -- Disagree - Not applicable / Not valid  -- Disagree - Clinically unable to determine / Unknown  -- Refer to Clinical Documentation Reviewer    PROVIDER RESPONSE TEXT:    This patient has Lumbar osteomyelitis without Sepsis.     Query created by: Shaheed Klein on 2023 10:06 AM      Electronically signed by:  Randy Alvarado MD 2023 4:44 PM

## 2023-11-27 NOTE — PROGRESS NOTES
Physical Therapy  Attempting to see patient for PT evaluation this pm. Patient has just completed working with OT. Patient able to complete bed mobility and partial sit<>stand with SBA but unable/unwilling to attempt any further mobility secondary to c/o severe pain. Patient requesting PT return later today and she does not feel she can tolerate any OOB mobility at this time. Will follow back this pm as able.   Thank you,  Nba Fulton, PT'

## 2023-11-27 NOTE — PROGRESS NOTES
Received notification from bedside RN about patient with regards to: 10/10 back pain, not yet due for PRN pain medication and requesting medication for relief  VS: /111, HR 92, RR 15, O2 sat 98% on RA    Intervention given:   - Toradol 30 mg IV x 1 dose    0305: requesting another dose of Toradol as it help earlier, Dilaudid given recently with no relief.  Will hold Toradol for now, last dose was 2059    - Tramadol 50 mg PO x 1 dose

## 2023-11-27 NOTE — PLAN OF CARE
Problem: Occupational Therapy - Adult  Goal: By Discharge: Performs self-care activities at highest level of function for planned discharge setting. See evaluation for individualized goals. Description: FUNCTIONAL STATUS PRIOR TO ADMISSION:  Patient reports prior to 2 days ago, patient reports being modified indepndent for household functional mobility using RW. When she is feeling well, she is able to perform BADLs with increased time and use of reacher (uses walk-in shower vs tub shower depending on the day). When experiencing acute flare in pain, requires 's assistance. Receives Help From: Family, ADL Assistance: Needs assistance, Bath: Minimal assistance, Dressing: Minimal assistance,  ,  , Toileting: Independent, Homemaking Assistance: Independent, Ambulation Assistance: Independent, Transfer Assistance: Independent, Active : No     HOME SUPPORT: Patient lived with  and roommate. Occupational Therapy Goals:  Initiated 11/27/2023  1. Patient will perform brief standing grooming with Supervision using RW prn within 7 day(s). 2.  Patient will perform upper body dressing and bathing with Modified [place] within 7 day(s). 3.  Patient will perform lower body dressing and bathing with Modified [place] within 7 day(s). 4.  Patient will perform toilet transfers with Supervision using RW prn within 7 day(s). 5.  Patient will perform all aspects of toileting with Modified [place] within 7 day(s). Outcome: Progressing   OCCUPATIONAL THERAPY EVALUATION    Patient: Bessy Banks (88 y.o. female)  Date: 11/27/2023  Primary Diagnosis: Acute osteomyelitis of lumbar spine ([address]) [M46.26]         Precautions:                    ASSESSMENT :  The patient is limited by decreased functional mobility, independence in ADLs, high-level IADLs, ROM, strength, sensation, activity tolerance, and increased pain levels.  Patient is received resting in bed, endorsing 9/10 pain despite

## 2023-11-27 NOTE — PLAN OF CARE
Problem: Discharge Planning  Goal: Discharge to home or other facility with appropriate resources  Outcome: Progressing  Flowsheets (Taken 11/26/2023 1300)  Discharge to home or other facility with appropriate resources: Identify barriers to discharge with patient and caregiver     Problem: Pain  Goal: Verbalizes/displays adequate comfort level or baseline comfort level  Outcome: Progressing     Problem: Safety - Adult  Goal: Free from fall injury  Outcome: Progressing

## 2023-11-27 NOTE — CARE COORDINATION
Care Management Initial Assessment       RUR: 15% \"medium risk\"  Readmission? No  1st IM letter given? N/A  1st  letter given: N/A    Initial note - 1430 PM: Chart reviewed. CM met with pt at the bedside to introduce self and role. Verified contact information and demographics. Pt resides with her spouse, Avery Subramanian, and two roommates in a one level home with 5 SHAN. Pt has no current PCP; CM specialist to assist with helping pt find a new PCP. Preferred pharmacy is [facility]. Pt has no hx of  services or a SNF stay. Pt is independent with ADL's but recently has needed assistance from her spouse with bathing and dressing. Pt is not an active . Pt has no ACP on file and is not interested in completing an ACP during hospital stay. Pt is a FULL code. Pt family able to transport pt home upon d/c. Full assessment below:     23 1430   Service Assessment   Patient Orientation Alert and Oriented;Person;Place;Situation;Self   Cognition Alert   History Provided By Patient   Primary Caregiver Self  (Pt  assists with ADL's when needed.)   Support Systems Spouse/Significant Other;Friends/Neighbors   Patient's Healthcare Decision Maker is: Legal Next of Kin   PCP Verified by CM No  (Pt has no PCP)   Prior Functional Level Bathing;Dressing; Independent in ADLs/IADLs   Current Functional Level Assistance with the following:;Bathing;Dressing   Can patient return to prior living arrangement Yes   Ability to make needs known: Good   Family able to assist with home care needs: Yes   Would you like for me to discuss the discharge plan with any other family members/significant others, and if so, who?  Yes  Gallito Moreno () or Clementina Ceja (mother))   Financial Resources Medicaid  ([address] [place] [address] Medicaid)   [employer] None   Social/Functional History   Lives With Spouse;Friend(s)   Type of [address] [place] [address]  One level   Home Access Stairs to enter without rails   Entrance Stairs - Number of Steps 5 SHAN   Home Equipment Cane;Walker, standard   Receives Help From Family   ADL Assistance Needs assistance   Bath Minimal assistance   Dressing Minimal assistance   Toileting Independent   Homemaking Assistance Independent   Ambulation Assistance Independent   Transfer Assistance Independent   Active  No   Occupation Unemployed   Discharge Planning   Type of [facility] [address] Spouse/Significant Other;Friends   Current Services Prior To Admission None   Potential Assistance Needed N/A   DME Ordered? No   Potential Assistance Purchasing Medications No   Type of Home Care Services None   Patient expects to be discharged to: Carlos (ACP) Conversation    Date of Conversation: 11/27/23  Conducted with: Patient with Decision Making Capacity    Healthcare Decision Maker:  No healthcare decision makers have been documented. Click here to complete [address] including selection of the Healthcare Decision Maker Relationship (ie \"Primary\")   Today we discussed [address] The patient is considering options.     Content/Action Overview:  Has NO ACP documents/care preferences - information provided, considering goals and options  Reviewed DNR/DNI and patient elects Full Code (Attempt Resuscitation)    Length of Voluntary ACP Conversation in minutes:  <16 minutes (Non-Billable)    Vanessa Trujillo  [phone]

## 2023-11-27 NOTE — CONSULTS
42yo with history of lumbar fusion at [facility]. Concern for osteomyelitis. Agree with medical management and follow up with her spine surgeon. No urgent need for surgical intervention at this time.

## 2023-11-27 NOTE — PROGRESS NOTES
Hospitalist Progress Note    NAME: Holden Lipoma   :  1980   MRN:  [id number]            Subjective:     Chief Complaint / Reason for Physician Visit Back pain  Patient seen and evaluated at bedside, currently patient complaining of back pain. Discussed with RN events overnight. ,   \"Toradol works better with dilaudid\"    Review of Systems:  Symptom Y/N Comments  Symptom Y/N Comments   Fever/Chills Y   Chest Pain N    Poor Appetite Y   Edema N    Cough N   Abdominal Pain N    Sputum N   Joint Pain N    SOB/JAFFE N   Pruritis/Rash N    Nausea/vomit N   Tolerating PT/OT NA    Diarrhea N   Tolerating Diet Y    Constipation N   Other       Could NOT obtain due to:      Objective:     VITALS:   Last 24hrs VS reviewed since prior progress note. Most recent are:  [unfilled]    Intake/Output Summary (Last 24 hours) at 2023 1325  Last data filed at 2023 0710  Gross per 24 hour   Intake --   Output 1650 ml   Net -1650 ml          PHYSICAL EXAM:  General: Patient appears uncomfortable    EENT:  EOMI. Anicteric sclerae. MMM  Resp:  CTA bilaterally, no wheezing or rales. No accessory muscle use  CV:  Regular  rhythm, s1/s2 no m/r/g No edema  GI:  Soft, Non distended, Non tender. +Bowel sounds  Neurologic:  Alert and oriented X 3, normal speech,   Psych:   Good insight. Not anxious nor agitated  Skin:  No rashes. No jaundice    Procedures: see electronic medical records for all procedures/Xrays and details which were not copied into this note but were reviewed prior to creation of Plan. LABS:  I reviewed today's most current labs and imaging studies.   Pertinent labs include:  Recent Labs     23  0330 23  0505   WBC 12.3* 13.0*   HGB 13.4 12.5   HCT 38.9 37.1    197       Recent Labs     23  0330 23  0505 23  0500    132* 133*   K 3.6 3.8 3.7    100 100   CO2 29 26 26   BUN 9 6 8   MG 1.8  --  2.0   ALT 22 18  --          Signed: Usha Lovett MD    Medical Decision Making:    Labs reviewed by myself   CBC/CMP    Diagnostic data reviewed by myself   EKG telemetry strip consistent with NSR    Toxic drug monitoring    Vancomycin, monitor for GARRET, dosing with pharmacy assistance    Discussed case with   Case Management in IDRs    MDM Discussion: Patient with numerous medical comorbidities, each with increased risk for mortality and morbidity if left untreated. Patient requires medications with high risk of toxicity and need  for intensive monitoring. I have reviewed patient's presenting subjective and objective findings, as well as all laboratory studies, imaging studies, and vital signs to date as well as treatment rendered and patient's response to those treatments. In addition, prior medical, surgical and relevant social and family histories were reviewed. This is dictation was done by dragon, computer voice recognition software. Quite often unanticipated grammatical, syntax, homophones and other interpretive errors or inadvertently transcribed by the computer software. Please excuse errors that have escaped final proofreading. Thank you. Reviewed most current lab test results and cultures  YES  Reviewed most current radiology test results   YES  Review and summation of old records today    NO  Reviewed patient's current orders and MAR    YES  PMH/SH reviewed - no change compared to H&P          Assessment / Plan:  Lumbar osteomyelitis/discitis POA  ?  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- h/o back surgery by [facility] spine department in past    Follow up blood cultures  Trend inflammatory markers  Cont IV Abx- Vancomycin Zosyn for broad-spectrum antibiotic coverage  Infectious disease consulted- awaiting further recommendations  IP Spine surgery consult placed- awaiting input from neurosurgeryappreciated, as long as no alarm signs or symptoms, no surgical intervention needed at this time, continue current IV antibiotics  Pain

## 2023-11-27 NOTE — CARE COORDINATION
Transition of Care Plan:    RUR: 15%  Prior Level of Functioning:   Disposition: SNF  If SNF or IPR: Date FOC offered:   Date FOC received:   Accepting facility:   Date authorization started with reference number:   Date authorization received and expires: Follow up appointments:   DME needed:   Transportation at discharge:   IM/IMM Medicare/ letter given: n/a  Is patient a  and connected with [facility]? If yes, was [employer] transfer form completed and [facility] notified? Caregiver Contact:   Discharge Caregiver contacted prior to discharge? Care Conference needed? Barriers to discharge:  placement & ID recommendation    Ensemble informed CM that pt's insurance is out of network. CM informed MD & pt. Patient has declined being transferred to another hospital.  CM informed pt she may or may not get a hospital bill. Patient was fine with that. Patient stated she will not get the kind of care she has been receiving here at another hospital.   Due to pt's history of drug use, patient is unable to d/c home. CM discussed other d/c options with patient. Patient has been to [address] in the past and will not return. Patient is in agreement to go to a SNF to finish IV ABX & requested CM send a referral to [facility]. CM will continue to follow.     Maricruz Smith  Ext [phone]

## 2023-11-28 ENCOUNTER — TELEPHONE (OUTPATIENT)
Age: 43
End: 2023-11-28

## 2023-11-28 PROBLEM — B99.9 RECURRENT INFECTIONS: Status: ACTIVE | Noted: 2023-11-28

## 2023-11-28 PROBLEM — K74.60 CIRRHOSIS OF LIVER WITHOUT ASCITES (HCC): Status: ACTIVE | Noted: 2023-09-06

## 2023-11-28 PROBLEM — K08.109 EDENTULOUS: Status: ACTIVE | Noted: 2023-11-28

## 2023-11-28 LAB
ANION GAP SERPL CALC-SCNC: 4 MMOL/L (ref 5–15)
BASOPHILS # BLD: 0 K/UL (ref 0–0.1)
BASOPHILS NFR BLD: 1 % (ref 0–1)
BUN SERPL-MCNC: 12 MG/DL (ref 6–20)
BUN/CREAT SERPL: 16 (ref 12–20)
CALCIUM SERPL-MCNC: 9.2 MG/DL (ref 8.5–10.1)
CHLORIDE SERPL-SCNC: 101 MMOL/L (ref 97–108)
CO2 SERPL-SCNC: 29 MMOL/L (ref 21–32)
CREAT SERPL-MCNC: 0.73 MG/DL (ref 0.55–1.02)
DIFFERENTIAL METHOD BLD: NORMAL
EOSINOPHIL # BLD: 0.2 K/UL (ref 0–0.4)
EOSINOPHIL NFR BLD: 3 % (ref 0–7)
ERYTHROCYTE [DISTWIDTH] IN BLOOD BY AUTOMATED COUNT: 12.9 % (ref 11.5–14.5)
GLUCOSE SERPL-MCNC: 117 MG/DL (ref 65–100)
HCT VFR BLD AUTO: 36 % (ref 35–47)
HGB BLD-MCNC: 12.5 G/DL (ref 11.5–16)
IMM GRANULOCYTES # BLD AUTO: 0 K/UL (ref 0–0.04)
IMM GRANULOCYTES NFR BLD AUTO: 0 % (ref 0–0.5)
LYMPHOCYTES # BLD: 1.8 K/UL (ref 0.8–3.5)
LYMPHOCYTES NFR BLD: 26 % (ref 12–49)
MCH RBC QN AUTO: 28.9 PG (ref 26–34)
MCHC RBC AUTO-ENTMCNC: 34.7 G/DL (ref 30–36.5)
MCV RBC AUTO: 83.1 FL (ref 80–99)
MONOCYTES # BLD: 0.5 K/UL (ref 0–1)
MONOCYTES NFR BLD: 7 % (ref 5–13)
NEUTS SEG # BLD: 4.4 K/UL (ref 1.8–8)
NEUTS SEG NFR BLD: 63 % (ref 32–75)
NRBC # BLD: 0 K/UL (ref 0–0.01)
NRBC BLD-RTO: 0 PER 100 WBC
PLATELET # BLD AUTO: 268 K/UL (ref 150–400)
PMV BLD AUTO: 9.6 FL (ref 8.9–12.9)
POTASSIUM SERPL-SCNC: 4 MMOL/L (ref 3.5–5.1)
RBC # BLD AUTO: 4.33 M/UL (ref 3.8–5.2)
SODIUM SERPL-SCNC: 134 MMOL/L (ref 136–145)
WBC # BLD AUTO: 6.9 K/UL (ref 3.6–11)

## 2023-11-28 PROCEDURE — 80048 BASIC METABOLIC PNL TOTAL CA: CPT

## 2023-11-28 PROCEDURE — 6360000002 HC RX W HCPCS: Performed by: INTERNAL MEDICINE

## 2023-11-28 PROCEDURE — 93005 ELECTROCARDIOGRAM TRACING: CPT

## 2023-11-28 PROCEDURE — 97161 PT EVAL LOW COMPLEX 20 MIN: CPT | Performed by: PHYSICAL THERAPIST

## 2023-11-28 PROCEDURE — 97116 GAIT TRAINING THERAPY: CPT | Performed by: PHYSICAL THERAPIST

## 2023-11-28 PROCEDURE — 2500000003 HC RX 250 WO HCPCS: Performed by: INTERNAL MEDICINE

## 2023-11-28 PROCEDURE — 97530 THERAPEUTIC ACTIVITIES: CPT | Performed by: PHYSICAL THERAPIST

## 2023-11-28 PROCEDURE — 02HV33Z INSERTION OF INFUSION DEVICE INTO SUPERIOR VENA CAVA, PERCUTANEOUS APPROACH: ICD-10-PCS | Performed by: INTERNAL MEDICINE

## 2023-11-28 PROCEDURE — 99223 1ST HOSP IP/OBS HIGH 75: CPT | Performed by: INTERNAL MEDICINE

## 2023-11-28 PROCEDURE — 2580000003 HC RX 258: Performed by: STUDENT IN AN ORGANIZED HEALTH CARE EDUCATION/TRAINING PROGRAM

## 2023-11-28 PROCEDURE — 1100000000 HC RM PRIVATE

## 2023-11-28 PROCEDURE — 2580000003 HC RX 258: Performed by: INTERNAL MEDICINE

## 2023-11-28 PROCEDURE — 85025 COMPLETE CBC W/AUTO DIFF WBC: CPT

## 2023-11-28 PROCEDURE — 36415 COLL VENOUS BLD VENIPUNCTURE: CPT

## 2023-11-28 PROCEDURE — 6370000000 HC RX 637 (ALT 250 FOR IP): Performed by: INTERNAL MEDICINE

## 2023-11-28 PROCEDURE — 6360000002 HC RX W HCPCS: Performed by: STUDENT IN AN ORGANIZED HEALTH CARE EDUCATION/TRAINING PROGRAM

## 2023-11-28 RX ORDER — KETOROLAC TROMETHAMINE 10 MG/1
10 TABLET, FILM COATED ORAL EVERY 6 HOURS PRN
Qty: 20 TABLET | Refills: 0 | Status: SHIPPED | OUTPATIENT
Start: 2023-11-28

## 2023-11-28 RX ORDER — FENTANYL 75 UG/1
1 PATCH TRANSDERMAL
Qty: 1 PATCH | Refills: 0 | Status: SHIPPED | OUTPATIENT
Start: 2023-11-28 | End: 2023-12-28

## 2023-11-28 RX ADMIN — SUCRALFATE 1 G: 1 TABLET ORAL at 14:51

## 2023-11-28 RX ADMIN — MEROPENEM 1000 MG: 1 INJECTION, POWDER, FOR SOLUTION INTRAVENOUS at 10:15

## 2023-11-28 RX ADMIN — VANCOMYCIN HYDROCHLORIDE 1250 MG: 10 INJECTION, POWDER, LYOPHILIZED, FOR SOLUTION INTRAVENOUS at 17:25

## 2023-11-28 RX ADMIN — KETOROLAC TROMETHAMINE 30 MG: 30 INJECTION, SOLUTION INTRAMUSCULAR; INTRAVENOUS at 05:20

## 2023-11-28 RX ADMIN — VANCOMYCIN HYDROCHLORIDE 1250 MG: 10 INJECTION, POWDER, LYOPHILIZED, FOR SOLUTION INTRAVENOUS at 05:24

## 2023-11-28 RX ADMIN — CEFEPIME 2000 MG: 2 INJECTION, POWDER, FOR SOLUTION INTRAVENOUS at 20:19

## 2023-11-28 RX ADMIN — KETOROLAC TROMETHAMINE 30 MG: 30 INJECTION, SOLUTION INTRAMUSCULAR; INTRAVENOUS at 18:54

## 2023-11-28 RX ADMIN — ENOXAPARIN SODIUM 40 MG: 100 INJECTION SUBCUTANEOUS at 10:06

## 2023-11-28 RX ADMIN — SODIUM CHLORIDE, PRESERVATIVE FREE 10 ML: 5 INJECTION INTRAVENOUS at 10:11

## 2023-11-28 RX ADMIN — ONDANSETRON 4 MG: 2 INJECTION INTRAMUSCULAR; INTRAVENOUS at 05:58

## 2023-11-28 RX ADMIN — HYDROMORPHONE HYDROCHLORIDE 1 MG: 1 INJECTION, SOLUTION INTRAMUSCULAR; INTRAVENOUS; SUBCUTANEOUS at 10:06

## 2023-11-28 RX ADMIN — HYDROMORPHONE HYDROCHLORIDE 1 MG: 1 INJECTION, SOLUTION INTRAMUSCULAR; INTRAVENOUS; SUBCUTANEOUS at 14:52

## 2023-11-28 RX ADMIN — HYDROMORPHONE HYDROCHLORIDE 1 MG: 1 INJECTION, SOLUTION INTRAMUSCULAR; INTRAVENOUS; SUBCUTANEOUS at 01:44

## 2023-11-28 RX ADMIN — PANTOPRAZOLE SODIUM 40 MG: 40 TABLET, DELAYED RELEASE ORAL at 10:05

## 2023-11-28 RX ADMIN — SUCRALFATE 1 G: 1 TABLET ORAL at 20:20

## 2023-11-28 RX ADMIN — METHOCARBAMOL TABLETS 500 MG: 500 TABLET, COATED ORAL at 23:21

## 2023-11-28 RX ADMIN — HYDROMORPHONE HYDROCHLORIDE 1 MG: 1 INJECTION, SOLUTION INTRAMUSCULAR; INTRAVENOUS; SUBCUTANEOUS at 05:45

## 2023-11-28 RX ADMIN — KETOROLAC TROMETHAMINE 30 MG: 30 INJECTION, SOLUTION INTRAMUSCULAR; INTRAVENOUS at 12:07

## 2023-11-28 RX ADMIN — SODIUM CHLORIDE, PRESERVATIVE FREE 10 ML: 5 INJECTION INTRAVENOUS at 20:20

## 2023-11-28 RX ADMIN — HYDROMORPHONE HYDROCHLORIDE 1 MG: 1 INJECTION, SOLUTION INTRAMUSCULAR; INTRAVENOUS; SUBCUTANEOUS at 20:19

## 2023-11-28 RX ADMIN — SUCRALFATE 1 G: 1 TABLET ORAL at 10:05

## 2023-11-28 RX ADMIN — MEROPENEM 1000 MG: 1 INJECTION, POWDER, FOR SOLUTION INTRAVENOUS at 01:21

## 2023-11-28 ASSESSMENT — PAIN SCALES - GENERAL
PAINLEVEL_OUTOF10: 9
PAINLEVEL_OUTOF10: 8
PAINLEVEL_OUTOF10: 9
PAINLEVEL_OUTOF10: 10
PAINLEVEL_OUTOF10: 10
PAINLEVEL_OUTOF10: 5
PAINLEVEL_OUTOF10: 7
PAINLEVEL_OUTOF10: 7
PAINLEVEL_OUTOF10: 10
PAINLEVEL_OUTOF10: 9
PAINLEVEL_OUTOF10: 10

## 2023-11-28 ASSESSMENT — PAIN DESCRIPTION - DESCRIPTORS
DESCRIPTORS: ACHING;SORE;TENDER
DESCRIPTORS: THROBBING
DESCRIPTORS: STABBING;THROBBING
DESCRIPTORS: THROBBING
DESCRIPTORS: THROBBING
DESCRIPTORS: STABBING;THROBBING
DESCRIPTORS: THROBBING
DESCRIPTORS: ACHING
DESCRIPTORS: ACHING;THROBBING

## 2023-11-28 ASSESSMENT — PAIN DESCRIPTION - LOCATION
LOCATION: BACK

## 2023-11-28 ASSESSMENT — PAIN - FUNCTIONAL ASSESSMENT
PAIN_FUNCTIONAL_ASSESSMENT: PREVENTS OR INTERFERES SOME ACTIVE ACTIVITIES AND ADLS
PAIN_FUNCTIONAL_ASSESSMENT: ACTIVITIES ARE NOT PREVENTED
PAIN_FUNCTIONAL_ASSESSMENT: PREVENTS OR INTERFERES SOME ACTIVE ACTIVITIES AND ADLS
PAIN_FUNCTIONAL_ASSESSMENT: PREVENTS OR INTERFERES SOME ACTIVE ACTIVITIES AND ADLS
PAIN_FUNCTIONAL_ASSESSMENT: ACTIVITIES ARE NOT PREVENTED
PAIN_FUNCTIONAL_ASSESSMENT: PREVENTS OR INTERFERES SOME ACTIVE ACTIVITIES AND ADLS
PAIN_FUNCTIONAL_ASSESSMENT: ACTIVITIES ARE NOT PREVENTED

## 2023-11-28 ASSESSMENT — PAIN DESCRIPTION - FREQUENCY
FREQUENCY: CONTINUOUS
FREQUENCY: CONTINUOUS

## 2023-11-28 ASSESSMENT — PAIN DESCRIPTION - ORIENTATION
ORIENTATION: LOWER

## 2023-11-28 ASSESSMENT — PAIN DESCRIPTION - PAIN TYPE
TYPE: ACUTE PAIN
TYPE: ACUTE PAIN

## 2023-11-28 ASSESSMENT — PAIN DESCRIPTION - ONSET
ONSET: ON-GOING
ONSET: ON-GOING

## 2023-11-28 NOTE — PROGRESS NOTES
5 - This RN went to assess patient's mental status regarding suicide, with primary LPN, after PT worked with patient and stated they wished they were dead. This RN completed a suicide screen on the patient and they stated \"I want to live. I have a family that needs me and kids I love. I am just in so much pain that it hurts. \" This RN stated they would message the provider for a more proactive pain medication regimen.

## 2023-11-28 NOTE — PROGRESS NOTES
End of Shift Note    Bedside shift change report given to Gualberto Berry (oncoming nurse) by Pura Deutsch RN (offgoing nurse). Report included the following information SBAR, Kardex, and MAR    Shift worked:  8926-0255     Shift summary and any significant changes:     Pain control with dilaudid and toradol. Pt using purwick as needed. Pt tolerated diet, no N/V. Concerns for physician to address:  none     Zone phone for oncoming shift:   [phone]       Activity:     Number times ambulated in hallways past shift: 0  Number of times OOB to chair past shift: 0    Cardiac:   Cardiac Monitoring: Yes           Access:  Current line(s): PIV     Genitourinary:   Urinary status: voiding and external catheter    Respiratory:      Chronic home O2 use?: NO  Incentive spirometer at bedside: YES       GI:     Current diet:  ADULT DIET; Regular  Passing flatus: YES  Tolerating current diet: YES       Pain Management:   Patient states pain is manageable on current regimen: YES    Skin:     Interventions: increase time out of bed    Patient Safety:  Fall Score:    Interventions: assistive device (walker, cane.  etc), gripper socks, and pt to call before getting OOB       Length of Stay:  Expected LOS: 3  Actual LOS: 2      Pura Deutsch, RN

## 2023-11-28 NOTE — CONSULTS
Infectious Disease Consult    Date of Consultation:  November 28, 2023  Reason for Consultation:Recurrent discitis/osteomyelitis   Referring Physician: Yara Flores  Date of Admission:11/25/2023      Impression    Lumbar osteomyelitis/ discitis-recurrent  CT abd/ pelvis+ progression of chronic discitis osteomyelitis at   L4-L5 may involve the right L5 interpedicular screw. No evidence of abscess. H/o Surgery at [facility]. H/o Acute discitis L4/5- admitted 9/2023  MRI 9/1+ for acute discitis at L4/5 no drainable fluid collection  Or epidural abscess. Unable to aspirate per IR. Improving  Pt was Dc on Vanc/ Cefepime IV x 6 weeks  Planned end date 10/15. S/p AMA from [facility]. Per DC note from [facility] indicates patient  Had recurrent drug use and admitted there. Restarted on cefepime IV      H/o GPC bacteremia  Blood cultures 9/1 + GPC1/2  Final ID pending, sent to Ref Lab  GPR on aerobic & anaerobic susceptibility  ? Contaminant  Microbacterium sp- PCN & vanc sensitive  Repeat cultures 9/3-no growth     H/o \"spine\" infection and bacteremia per pt  Treated with vancomycin IV x6 weeks  At [facility]  Records reviewed. Diagnosis was Pyelonephritis. No culture reports     H/o hepatitis C and cirrhosis  HCV RNA- undetectable,   Hep B serology negative,   HIV negative     H/o IVDU  Recent use per DC note from [facility]  Toxicology screen not done this admission       Edentulous     Tobacco use  Cessation advised        Depression and anxiety  On Seroquel        Plan  Continue with vancomycin and cefepime IV  Pharmacy to dose per creatinine clearance please  Patient for 6 weeks of antibiotic therapy  Adequate fluids, daily probiotic.     May DC from ID standpoint    Antimicrobial orders for discharge  -Vancomycin 1.25 g  IV every 12 hours end date 1/6  -Cefepime 2 g IV every 8 hours end date 1/6  -Pull line at end of therapy.    -Weekly CBC, CMP, vancomycin trough & ESR/CRP every other week-  -Fax reports to [phone],

## 2023-11-28 NOTE — PROGRESS NOTES
Occupational Therapy    OT session attempted but pt politely declined stating she was in too much pain to participate. Unable to encourage participation. Will defer OT session and follow up as able.     Dawson Lopez, OT

## 2023-11-28 NOTE — PROGRESS NOTES
End of Shift Note    Bedside shift change report given to Abdiel Romo LPN (oncoming nurse) by Jose Manuel Murphy RN (offgoing nurse). Report included the following information SBAR, Intake/Output, MAR, Accordion, and Recent Results    Shift worked:  7pm-7am     Shift summary and any significant changes:     Patient alert and oriented x4. Patient having pain in her lower back majority of the shift PRN tordol and dilaudid given with good effect. Patient repositioning self in bed. [place] in place. Patient tolerating diet. Concerns for physician to address:  none     Zone phone for oncoming shift:   [phone]       Activity:     Number times ambulated in hallways past shift: 0  Number of times OOB to chair past shift: 0    Cardiac:   Cardiac Monitoring: Yes           Access:  Current line(s): PIV     Genitourinary:   Urinary status: voiding, external catheter    Respiratory:      Chronic home O2 use?: NO  Incentive spirometer at bedside: N/A       GI:     Current diet:  ADULT DIET; Regular  Passing flatus: YES  Tolerating current diet: YES       Pain Management:   Patient states pain is manageable on current regimen: YES    Skin:     Interventions: turn team, specialty bed, float heels, increase time out of bed, limit briefs, and internal/external urinary devices    Patient Safety:  Fall Score:    Interventions: bed/chair alarm, assistive device (walker, cane.  etc), gripper socks, pt to call before getting OOB, and stay with me (per policy)       Length of Stay:  Expected LOS: 3  Actual LOS: 3      Jose Manuel Murphy RN

## 2023-11-28 NOTE — PLAN OF CARE
Problem: Discharge Planning  Goal: Discharge to home or other facility with appropriate resources  11/28/2023 0026 by Frannie Haile RN  Outcome: Progressing     Problem: Pain  Goal: Verbalizes/displays adequate comfort level or baseline comfort level  11/28/2023 0026 by Frannie Haile RN  Outcome: Progressing     Problem: Safety - Adult  Goal: Free from fall injury  11/28/2023 0026 by Frannie Haile RN  Outcome: Progressing     Problem: Skin/Tissue Integrity  Goal: Absence of new skin breakdown  Description: 1. Monitor for areas of redness and/or skin breakdown  2. Assess vascular access sites hourly  3. Every 4-6 hours minimum:  Change oxygen saturation probe site  4. Every 4-6 hours:  If on nasal continuous positive airway pressure, respiratory therapy assess nares and determine need for appliance change or resting period.   Outcome: Progressing     Problem: Musculoskeletal - Adult  Goal: Return mobility to safest level of function  Outcome: Progressing     Problem: Musculoskeletal - Adult  Goal: Maintain proper alignment of affected body part  Outcome: Progressing     Problem: Musculoskeletal - Adult  Goal: Return ADL status to a safe level of function  Outcome: Progressing

## 2023-11-28 NOTE — CARE COORDINATION
Transition of Care Plan:     RUR: 14%  Prior Level of Functioning:   Disposition: SNF  If SNF or IPR: Date FOC offered:   Date FOC received:   Accepting facility:   Date authorization started with reference number:   Date authorization received and expires: Follow up appointments:   DME needed:   Transportation at discharge:   IM/IMM Medicare/ letter given: n/a  Is patient a [place] and connected with VA? If yes, was [employer] transfer form completed and VA notified? Caregiver Contact:   Discharge Caregiver contacted prior to discharge? Care Conference needed? Barriers to discharge:  placement    [facility] unable to accept, and [facility] is reviewing. CM sent to several SNF's in the [address] [place] [address] area. CM will continue to follow.     Mary Sandoval  Ext [phone]

## 2023-11-28 NOTE — PLAN OF CARE
Problem: Physical Therapy - Adult  Goal: By Discharge: Performs mobility at highest level of function for planned discharge setting. See evaluation for individualized goals. Description: FUNCTIONAL STATUS PRIOR TO ADMISSION: Patient reports independence within home and uses RW outside home when feeling good. Has chronic pain issues which limit mobility when flaring. HOME SUPPORT PRIOR TO ADMISSION: The patient lived with  and a roommate and did not need any assistance when pain controlled. Physical Therapy Goals  Initiated 11/28/2023  1. Patient will move from supine to sit and sit to supine , scoot up and down, and roll side to side in bed with independence within 7 day(s). 2.  Patient will transfer from bed to chair and chair to bed with modified independence using the least restrictive device within 7 day(s). 3.  Patient will perform sit to stand with modified independence within 7 day(s). 4.  Patient will ambulate with modified independence for 100 feet with the least restrictive device within 7 day(s). 5.  Patient will ascend/descend 5 stairs with 1 handrail(s) with modified independence within 7 day(s). 11/28/2023 1538 by Neo Quintana, PT  Outcome: Progressing   PHYSICAL THERAPY EVALUATION    Patient: Monique Fajardo (1 y.o. female)  Date: 11/28/2023  Primary Diagnosis: Acute osteomyelitis of lumbar spine ([address]) [M46.26]       Precautions:   falls                   ASSESSMENT :   Patient seen for PT evaluation following admission for acute osteomyelitis of lumbar spine with uncontrolled pain. Patient has just received Fentanyl patch and is agreeable to therapy with encouragement. Patient reporting 10/10 pain in low back and R LE , but was pleasant and conversational throughout tx session. Patient presents with generalized weakness and impaired functional mobility, balance and endurance which limit functional independence.  She was able to complete bed mobility with SBA and

## 2023-11-29 LAB
ANION GAP SERPL CALC-SCNC: 5 MMOL/L (ref 5–15)
BUN SERPL-MCNC: 14 MG/DL (ref 6–20)
BUN/CREAT SERPL: 20 (ref 12–20)
CALCIUM SERPL-MCNC: 9.1 MG/DL (ref 8.5–10.1)
CHLORIDE SERPL-SCNC: 104 MMOL/L (ref 97–108)
CO2 SERPL-SCNC: 25 MMOL/L (ref 21–32)
CREAT SERPL-MCNC: 0.69 MG/DL (ref 0.55–1.02)
EKG ATRIAL RATE: 79 BPM
EKG DIAGNOSIS: NORMAL
EKG P AXIS: 18 DEGREES
EKG P-R INTERVAL: 148 MS
EKG Q-T INTERVAL: 382 MS
EKG QRS DURATION: 82 MS
EKG QTC CALCULATION (BAZETT): 438 MS
EKG R AXIS: 52 DEGREES
EKG T AXIS: 65 DEGREES
EKG VENTRICULAR RATE: 79 BPM
GLUCOSE SERPL-MCNC: 90 MG/DL (ref 65–100)
POTASSIUM SERPL-SCNC: 4.6 MMOL/L (ref 3.5–5.1)
SODIUM SERPL-SCNC: 134 MMOL/L (ref 136–145)

## 2023-11-29 PROCEDURE — 99233 SBSQ HOSP IP/OBS HIGH 50: CPT | Performed by: INTERNAL MEDICINE

## 2023-11-29 PROCEDURE — 1100000000 HC RM PRIVATE

## 2023-11-29 PROCEDURE — 6370000000 HC RX 637 (ALT 250 FOR IP): Performed by: INTERNAL MEDICINE

## 2023-11-29 PROCEDURE — 80048 BASIC METABOLIC PNL TOTAL CA: CPT

## 2023-11-29 PROCEDURE — 97116 GAIT TRAINING THERAPY: CPT

## 2023-11-29 PROCEDURE — 6360000002 HC RX W HCPCS: Performed by: INTERNAL MEDICINE

## 2023-11-29 PROCEDURE — 2580000003 HC RX 258: Performed by: STUDENT IN AN ORGANIZED HEALTH CARE EDUCATION/TRAINING PROGRAM

## 2023-11-29 PROCEDURE — 36415 COLL VENOUS BLD VENIPUNCTURE: CPT

## 2023-11-29 PROCEDURE — 2500000003 HC RX 250 WO HCPCS: Performed by: INTERNAL MEDICINE

## 2023-11-29 PROCEDURE — 97530 THERAPEUTIC ACTIVITIES: CPT

## 2023-11-29 PROCEDURE — 2580000003 HC RX 258: Performed by: INTERNAL MEDICINE

## 2023-11-29 RX ORDER — HYDROMORPHONE HYDROCHLORIDE 1 MG/ML
1 INJECTION, SOLUTION INTRAMUSCULAR; INTRAVENOUS; SUBCUTANEOUS EVERY 6 HOURS PRN
Status: DISCONTINUED | OUTPATIENT
Start: 2023-11-29 | End: 2023-12-01 | Stop reason: HOSPADM

## 2023-11-29 RX ADMIN — KETOROLAC TROMETHAMINE 30 MG: 30 INJECTION, SOLUTION INTRAMUSCULAR; INTRAVENOUS at 09:09

## 2023-11-29 RX ADMIN — KETOROLAC TROMETHAMINE 30 MG: 30 INJECTION, SOLUTION INTRAMUSCULAR; INTRAVENOUS at 01:09

## 2023-11-29 RX ADMIN — CEFEPIME 2000 MG: 2 INJECTION, POWDER, FOR SOLUTION INTRAVENOUS at 11:36

## 2023-11-29 RX ADMIN — METHOCARBAMOL TABLETS 500 MG: 500 TABLET, COATED ORAL at 20:31

## 2023-11-29 RX ADMIN — HYDROMORPHONE HYDROCHLORIDE 1 MG: 1 INJECTION, SOLUTION INTRAMUSCULAR; INTRAVENOUS; SUBCUTANEOUS at 11:34

## 2023-11-29 RX ADMIN — ENOXAPARIN SODIUM 40 MG: 100 INJECTION SUBCUTANEOUS at 09:09

## 2023-11-29 RX ADMIN — SUCRALFATE 1 G: 1 TABLET ORAL at 09:09

## 2023-11-29 RX ADMIN — KETOROLAC TROMETHAMINE 30 MG: 30 INJECTION, SOLUTION INTRAMUSCULAR; INTRAVENOUS at 15:26

## 2023-11-29 RX ADMIN — METHOCARBAMOL TABLETS 500 MG: 500 TABLET, COATED ORAL at 15:25

## 2023-11-29 RX ADMIN — HYDROMORPHONE HYDROCHLORIDE 1 MG: 1 INJECTION, SOLUTION INTRAMUSCULAR; INTRAVENOUS; SUBCUTANEOUS at 17:41

## 2023-11-29 RX ADMIN — HYDROMORPHONE HYDROCHLORIDE 1 MG: 1 INJECTION, SOLUTION INTRAMUSCULAR; INTRAVENOUS; SUBCUTANEOUS at 23:34

## 2023-11-29 RX ADMIN — SUCRALFATE 1 G: 1 TABLET ORAL at 20:31

## 2023-11-29 RX ADMIN — METHOCARBAMOL TABLETS 500 MG: 500 TABLET, COATED ORAL at 09:09

## 2023-11-29 RX ADMIN — SODIUM CHLORIDE: 9 INJECTION, SOLUTION INTRAVENOUS at 04:34

## 2023-11-29 RX ADMIN — SODIUM CHLORIDE, PRESERVATIVE FREE 10 ML: 5 INJECTION INTRAVENOUS at 20:35

## 2023-11-29 RX ADMIN — VANCOMYCIN HYDROCHLORIDE 1250 MG: 10 INJECTION, POWDER, LYOPHILIZED, FOR SOLUTION INTRAVENOUS at 17:41

## 2023-11-29 RX ADMIN — VANCOMYCIN HYDROCHLORIDE 1250 MG: 10 INJECTION, POWDER, LYOPHILIZED, FOR SOLUTION INTRAVENOUS at 04:35

## 2023-11-29 RX ADMIN — CEFEPIME 2000 MG: 2 INJECTION, POWDER, FOR SOLUTION INTRAVENOUS at 04:29

## 2023-11-29 RX ADMIN — CEFEPIME 2000 MG: 2 INJECTION, POWDER, FOR SOLUTION INTRAVENOUS at 20:34

## 2023-11-29 RX ADMIN — SUCRALFATE 1 G: 1 TABLET ORAL at 15:25

## 2023-11-29 RX ADMIN — PANTOPRAZOLE SODIUM 40 MG: 40 TABLET, DELAYED RELEASE ORAL at 09:09

## 2023-11-29 RX ADMIN — KETOROLAC TROMETHAMINE 30 MG: 30 INJECTION, SOLUTION INTRAMUSCULAR; INTRAVENOUS at 21:25

## 2023-11-29 ASSESSMENT — PAIN DESCRIPTION - DESCRIPTORS
DESCRIPTORS: ACHING
DESCRIPTORS: TENDER
DESCRIPTORS: TENDER
DESCRIPTORS: ACHING
DESCRIPTORS: ACHING
DESCRIPTORS: THROBBING
DESCRIPTORS: ACHING

## 2023-11-29 ASSESSMENT — PAIN DESCRIPTION - FREQUENCY
FREQUENCY: CONTINUOUS

## 2023-11-29 ASSESSMENT — PAIN DESCRIPTION - ONSET
ONSET: GRADUAL

## 2023-11-29 ASSESSMENT — PAIN - FUNCTIONAL ASSESSMENT

## 2023-11-29 ASSESSMENT — PAIN SCALES - GENERAL
PAINLEVEL_OUTOF10: 10
PAINLEVEL_OUTOF10: 9
PAINLEVEL_OUTOF10: 4
PAINLEVEL_OUTOF10: 7
PAINLEVEL_OUTOF10: 9
PAINLEVEL_OUTOF10: 5
PAINLEVEL_OUTOF10: 9
PAINLEVEL_OUTOF10: 7
PAINLEVEL_OUTOF10: 6
PAINLEVEL_OUTOF10: 7
PAINLEVEL_OUTOF10: 9

## 2023-11-29 ASSESSMENT — PAIN DESCRIPTION - ORIENTATION
ORIENTATION: LOWER

## 2023-11-29 ASSESSMENT — PAIN DESCRIPTION - LOCATION
LOCATION: BACK

## 2023-11-29 ASSESSMENT — PAIN DESCRIPTION - PAIN TYPE
TYPE: ACUTE PAIN

## 2023-11-29 ASSESSMENT — PAIN DESCRIPTION - DIRECTION
RADIATING_TOWARDS: LOWER

## 2023-11-29 NOTE — PLAN OF CARE
Problem: Discharge Planning  Goal: Discharge to home or other facility with appropriate resources  Outcome: Progressing     Problem: Pain  Goal: Verbalizes/displays adequate comfort level or baseline comfort level  Outcome: Progressing     Problem: Safety - Adult  Goal: Free from fall injury  Outcome: Progressing     Problem: Skin/Tissue Integrity  Goal: Absence of new skin breakdown  Description: 1. Monitor for areas of redness and/or skin breakdown  2. Assess vascular access sites hourly  3. Every 4-6 hours minimum:  Change oxygen saturation probe site  4. Every 4-6 hours:  If on nasal continuous positive airway pressure, respiratory therapy assess nares and determine need for appliance change or resting period.   Outcome: Progressing     Problem: Musculoskeletal - Adult  Goal: Return mobility to safest level of function  Outcome: Progressing  Goal: Maintain proper alignment of affected body part  Outcome: Progressing  Goal: Return ADL status to a safe level of function  Outcome: Progressing

## 2023-11-29 NOTE — PROCEDURES
Patient is not a candidate for a PICC line due to the patient's history of IV drug use and current use. On 09/26/23 at [facility] patient was seen instilling oxycodone in her PICC with a syringe that was taken out of the trash and then the patient left AMA.

## 2023-11-29 NOTE — PROGRESS NOTES
End of Shift Note    Bedside shift change report given to  Rubens Sims RN (oncoming nurse) by Leland Eric LPN (offgoing nurse). Report included the following information SBAR, Kardex, Intake/Output, MAR, and Recent Results    Shift worked:  7a-7p     Shift summary and any significant changes:     Pt up to chair for a few hours this shift. Worked with PT. Given toradol for pain x 2. Given dilaudid x 2 this shift for pain. Given scheduled antibiotics. Fentanyl patch applied to left deltoid. C-SSRS screen completed this shift. Pt possibly to discharge to SNF tomorrow. Concerns for physician to address:  Pain control?      Zone phone for oncoming shift:   [phone]            Leland Eric LPN

## 2023-11-29 NOTE — PLAN OF CARE
Problem: Physical Therapy - Adult  Goal: By Discharge: Performs mobility at highest level of function for planned discharge setting. See evaluation for individualized goals. Description: FUNCTIONAL STATUS PRIOR TO ADMISSION: Patient reports independence within home and uses RW outside home when feeling good. Has chronic pain issues which limit mobility when flaring. HOME SUPPORT PRIOR TO ADMISSION: The patient lived with  and a roommate and did not need any assistance when pain controlled. Physical Therapy Goals  Initiated 11/28/2023  1. Patient will move from supine to sit and sit to supine , scoot up and down, and roll side to side in bed with independence within 7 day(s). 2.  Patient will transfer from bed to chair and chair to bed with modified independence using the least restrictive device within 7 day(s). 3.  Patient will perform sit to stand with modified independence within 7 day(s). 4.  Patient will ambulate with modified independence for 100 feet with the least restrictive device within 7 day(s). 5.  Patient will ascend/descend 5 stairs with 1 handrail(s) with modified independence within 7 day(s). Outcome: Progressing   PHYSICAL THERAPY TREATMENT    Patient: Modesta Shane (46 y.o. female)  Date: 11/29/2023  Diagnosis: Acute osteomyelitis of lumbar spine ([address]) [M46.26] Acute osteomyelitis of lumbar spine ([address])      Precautions:                      ASSESSMENT:  Patient continues to benefit from skilled PT services and is progressing towards goals. Pt received in bed, agreeable to PT session. No physical assistance required for mobility. Pt reporting she is unable to bear much weight through her RLE due to severe pain, but able to manage short distance ambulation and transfers with use of RW safely this visit. Pt left seated in recliner.   Patient would benefit from rehab following discharge to improve overall functional mobility and continues to request referral to pain

## 2023-11-29 NOTE — PLAN OF CARE
Problem: Discharge Planning  Goal: Discharge to home or other facility with appropriate resources  11/29/2023 1122 by Justin Riley RN  Outcome: Progressing  11/29/2023 1117 by Justin Riley RN  Outcome: Progressing  11/29/2023 0221 by Allen Winston RN  Outcome: Progressing     Problem: Pain  Goal: Verbalizes/displays adequate comfort level or baseline comfort level  11/29/2023 1122 by Justin Riely RN  Outcome: Progressing  11/29/2023 1117 by Justin Riley RN  Outcome: Progressing  11/29/2023 0221 by Allen Winston RN  Outcome: Progressing     Problem: Safety - Adult  Goal: Free from fall injury  11/29/2023 1122 by Justin Riley RN  Outcome: Progressing  11/29/2023 1117 by Justin Riley RN  Outcome: Progressing  11/29/2023 0221 by Allen Winston RN  Outcome: Progressing     Problem: Skin/Tissue Integrity  Goal: Absence of new skin breakdown  Description: 1. Monitor for areas of redness and/or skin breakdown  2. Assess vascular access sites hourly  3. Every 4-6 hours minimum:  Change oxygen saturation probe site  4. Every 4-6 hours:  If on nasal continuous positive airway pressure, respiratory therapy assess nares and determine need for appliance change or resting period.   11/29/2023 1122 by Justin Riley RN  Outcome: Progressing  11/29/2023 1117 by Justin Riley RN  Outcome: Progressing  11/29/2023 0221 by Allen Winston RN  Outcome: Progressing     Problem: Musculoskeletal - Adult  Goal: Return mobility to safest level of function  11/29/2023 1122 by Justin Riley RN  Outcome: Progressing  11/29/2023 1117 by Justin Riley RN  Outcome: Progressing  11/29/2023 0221 by Allen Winston RN  Outcome: Progressing  Goal: Maintain proper alignment of affected body part  11/29/2023 1122 by Justin Riley RN  Outcome: Progressing  11/29/2023 1117 by Justin Riley RN  Outcome: Progressing  11/29/2023 0221 by Allen Winston RN  Outcome: Progressing  Goal: Return ADL status to a safe level of

## 2023-11-29 NOTE — CARE COORDINATION
Transition of Care Plan:     RUR: 14%  Prior Level of Functioning:   Disposition: transfer to [facility]  If SNF or IPR: Date FOC offered:   Date FOC received:   Accepting facility:   Date authorization started with reference number:   Date authorization received and expires: Follow up appointments:   DME needed:   Transportation at discharge: [facility]  IM/IMM Medicare/ letter given: n/a  Is patient a  and connected with VA? If yes, was [employer] transfer form completed and VA notified? Caregiver Contact:   Discharge Caregiver contacted prior to discharge? Care Conference needed? Barriers to discharge:  TUYET pt. Needs to transfer to [facility] to finish IV ABX    Due to pt's drug history, MD is not comfortable d/c pt to a SNF. CM has initiated transfer with the  transfer center and will continue to follow.     Clevester Dakin  Ext [phone]

## 2023-11-29 NOTE — PROGRESS NOTES
Occupational Therapy  Attempted to see patient for OT treatment. She had just returned to bed after being up to the [facility], and politely requested holding therapy right now. Will defer for now but continue to follow.

## 2023-11-29 NOTE — PLAN OF CARE
Problem: Discharge Planning  Goal: Discharge to home or other facility with appropriate resources  11/29/2023 1117 by Bandar Cota RN  Outcome: Progressing  11/29/2023 0221 by Cristino Conde RN  Outcome: Progressing     Problem: Pain  Goal: Verbalizes/displays adequate comfort level or baseline comfort level  11/29/2023 1117 by Bandar Cota RN  Outcome: Progressing  11/29/2023 0221 by Cristino Conde RN  Outcome: Progressing     Problem: Safety - Adult  Goal: Free from fall injury  11/29/2023 1117 by Bandar Cota RN  Outcome: Progressing  11/29/2023 0221 by Cristino Conde RN  Outcome: Progressing     Problem: Skin/Tissue Integrity  Goal: Absence of new skin breakdown  Description: 1. Monitor for areas of redness and/or skin breakdown  2. Assess vascular access sites hourly  3. Every 4-6 hours minimum:  Change oxygen saturation probe site  4. Every 4-6 hours:  If on nasal continuous positive airway pressure, respiratory therapy assess nares and determine need for appliance change or resting period.   11/29/2023 1117 by Bandar Cota RN  Outcome: Progressing  11/29/2023 0221 by Cristino Conde RN  Outcome: Progressing     Problem: Musculoskeletal - Adult  Goal: Return mobility to safest level of function  11/29/2023 1117 by Bandar Cota RN  Outcome: Progressing  11/29/2023 0221 by Cristino Conde RN  Outcome: Progressing  Goal: Maintain proper alignment of affected body part  11/29/2023 1117 by Bandar Cota RN  Outcome: Progressing  11/29/2023 0221 by Cristino Conde RN  Outcome: Progressing  Goal: Return ADL status to a safe level of function  11/29/2023 1117 by Bandar Cota RN  Outcome: Progressing  11/29/2023 0221 by Cristino Conde RN  Outcome: Progressing

## 2023-11-29 NOTE — PROGRESS NOTES
Hospitalist Progress Note    NAME: Indy Mcclain   :  1980   MRN:  [id number]            Subjective:     Chief Complaint / Reason for Physician Visit Back pain  Patient seen and evaluated at bedside, currently patient complaining of back pain. Discussed with RN events overnight. ,   \"Back hurts- need more pain meds\"    Review of Systems:  Symptom Y/N Comments  Symptom Y/N Comments   Fever/Chills n   Chest Pain N    Poor Appetite n   Edema N    Cough N   Abdominal Pain N    Sputum N   Joint Pain y Back pain   SOB/JAFFE N   Pruritis/Rash N    Nausea/vomit N   Tolerating PT/OT NA    Diarrhea N   Tolerating Diet Y    Constipation N   Other       Could NOT obtain due to:      Objective:     VITALS:   Last 24hrs VS reviewed since prior progress note. Most recent are:  [unfilled]    Intake/Output Summary (Last 24 hours) at 2023 0849  Last data filed at 2023 8716  Gross per 24 hour   Intake 3639.26 ml   Output 1000 ml   Net 2639.26 ml          PHYSICAL EXAM:  General: Patient appears uncomfortable    EENT:  EOMI. Anicteric sclerae. MMM  Resp:  CTA bilaterally, no wheezing or rales. No accessory muscle use  CV:  Regular  rhythm, s1/s2 no m/r/g No edema  GI:  Soft, Non distended, Non tender. +Bowel sounds  Neurologic:  Alert and oriented X 3, normal speech,   Psych:   Good insight. Not anxious nor agitated  Skin:  No rashes. No jaundice    Procedures: see electronic medical records for all procedures/Xrays and details which were not copied into this note but were reviewed prior to creation of Plan. LABS:  I reviewed today's most current labs and imaging studies.   Pertinent labs include:  Recent Labs     23  0542   WBC 6.9   HGB 12.5   HCT 36.0          Recent Labs     23  0500 23  0542 23  0452   * 134* 134*   K 3.7 4.0 4.6    101 104   CO2 26 29 25   BUN 8 12 14   MG 2.0  --   --          Signed: Kaylie Hammer MD    Medical Decision Making:    Labs

## 2023-11-29 NOTE — PLAN OF CARE
Problem: Discharge Planning  Goal: Discharge to home or other facility with appropriate resources  11/29/2023 1140 by Kristal Champagne RN  Outcome: Progressing  11/29/2023 1122 by Kristal Champagne RN  Outcome: Progressing  11/29/2023 1117 by Kristal Champagne RN  Outcome: Progressing  11/29/2023 0221 by Boom Baez RN  Outcome: Progressing     Problem: Pain  Goal: Verbalizes/displays adequate comfort level or baseline comfort level  11/29/2023 1140 by Kristal Champagne RN  Outcome: Progressing  11/29/2023 1122 by Kristal Champagne RN  Outcome: Progressing  11/29/2023 1117 by Kristal Champagne RN  Outcome: Progressing  11/29/2023 0221 by Boom Baez RN  Outcome: Progressing     Problem: Safety - Adult  Goal: Free from fall injury  11/29/2023 1140 by Kristal Champagne RN  Outcome: Progressing  11/29/2023 1122 by Kristal Champagne RN  Outcome: Progressing  11/29/2023 1117 by Kristal Champagne RN  Outcome: Progressing  11/29/2023 0221 by Boom Baez RN  Outcome: Progressing     Problem: Skin/Tissue Integrity  Goal: Absence of new skin breakdown  Description: 1. Monitor for areas of redness and/or skin breakdown  2. Assess vascular access sites hourly  3. Every 4-6 hours minimum:  Change oxygen saturation probe site  4. Every 4-6 hours:  If on nasal continuous positive airway pressure, respiratory therapy assess nares and determine need for appliance change or resting period.   11/29/2023 1140 by Kristal Champagne RN  Outcome: Progressing  11/29/2023 1122 by Kristal Champagne RN  Outcome: Progressing  11/29/2023 1117 by Kristal Champagne RN  Outcome: Progressing  11/29/2023 0221 by Boom Baez RN  Outcome: Progressing     Problem: Musculoskeletal - Adult  Goal: Return mobility to safest level of function  11/29/2023 1140 by Kristal Champagne RN  Outcome: Progressing  11/29/2023 1122 by Kristal Champagne RN  Outcome: Progressing  11/29/2023 1117 by Kristal Kinnier, RN  Outcome: Progressing  11/29/2023 0221 by Boom Baez RN  Outcome:

## 2023-11-29 NOTE — PROGRESS NOTES
End of Shift Note    Bedside shift change report given to Sung Park RN (oncoming nurse) by Syd Coreas RN (offgoing nurse). Report included the following information SBAR, Intake/Output, MAR, and Recent Results    Shift worked:  7pm-7am     Shift summary and any significant changes:    Patient alert and oriented x4. She was up in the chair at the start of shift and sat up for around 4 hrs. Patient on IV Toradol PRN for pain. Patient had fentanyl patch on her left arm at the start of shift with Tegaderm over it. Upon handoff this am, patch was off and only the Tegaderm was found on the floor. No patch was found, patient states she doesn't know what happened to it. Day shift nurse at bedside for this and stated she will update day provider of this situation. Concerns for physician to address:       Zone phone for oncoming shift:   [phone]       Activity:     Number times ambulated in hallways past shift: 0  Number of times OOB to chair past shift: 1    Cardiac:   Cardiac Monitoring: Yes           Access:  Current line(s): PIV     Genitourinary:   Urinary status: voiding    Respiratory:      Chronic home O2 use?: NO  Incentive spirometer at bedside: N/A       GI:     Current diet:  ADULT DIET; Regular  Passing flatus: YES  Tolerating current diet: YES       Pain Management:   Patient states pain is manageable on current regimen: YES    Skin:     Interventions: turn team, specialty bed, float heels, increase time out of bed, and limit briefs    Patient Safety:  Fall Score:    Interventions: bed/chair alarm, assistive device (walker, cane.  etc), gripper socks, pt to call before getting OOB, and stay with me (per policy)       Length of Stay:  Expected LOS: 3  Actual LOS: [address] [address] [address], RN

## 2023-11-30 LAB
ANION GAP SERPL CALC-SCNC: 6 MMOL/L (ref 5–15)
BUN SERPL-MCNC: 15 MG/DL (ref 6–20)
BUN/CREAT SERPL: 21 (ref 12–20)
CALCIUM SERPL-MCNC: 9.1 MG/DL (ref 8.5–10.1)
CHLORIDE SERPL-SCNC: 103 MMOL/L (ref 97–108)
CO2 SERPL-SCNC: 28 MMOL/L (ref 21–32)
CREAT SERPL-MCNC: 0.71 MG/DL (ref 0.55–1.02)
GLUCOSE SERPL-MCNC: 85 MG/DL (ref 65–100)
POTASSIUM SERPL-SCNC: 3.9 MMOL/L (ref 3.5–5.1)
SODIUM SERPL-SCNC: 137 MMOL/L (ref 136–145)
VANCOMYCIN SERPL-MCNC: 21.3 UG/ML

## 2023-11-30 PROCEDURE — 2500000003 HC RX 250 WO HCPCS: Performed by: INTERNAL MEDICINE

## 2023-11-30 PROCEDURE — 2580000003 HC RX 258: Performed by: INTERNAL MEDICINE

## 2023-11-30 PROCEDURE — 99232 SBSQ HOSP IP/OBS MODERATE 35: CPT | Performed by: INTERNAL MEDICINE

## 2023-11-30 PROCEDURE — 6360000002 HC RX W HCPCS: Performed by: INTERNAL MEDICINE

## 2023-11-30 PROCEDURE — 1100000000 HC RM PRIVATE

## 2023-11-30 PROCEDURE — 36415 COLL VENOUS BLD VENIPUNCTURE: CPT

## 2023-11-30 PROCEDURE — 6370000000 HC RX 637 (ALT 250 FOR IP): Performed by: INTERNAL MEDICINE

## 2023-11-30 PROCEDURE — 6370000000 HC RX 637 (ALT 250 FOR IP): Performed by: STUDENT IN AN ORGANIZED HEALTH CARE EDUCATION/TRAINING PROGRAM

## 2023-11-30 PROCEDURE — 80048 BASIC METABOLIC PNL TOTAL CA: CPT

## 2023-11-30 PROCEDURE — 2580000003 HC RX 258: Performed by: STUDENT IN AN ORGANIZED HEALTH CARE EDUCATION/TRAINING PROGRAM

## 2023-11-30 PROCEDURE — 80202 ASSAY OF VANCOMYCIN: CPT

## 2023-11-30 RX ORDER — GABAPENTIN 100 MG/1
100 CAPSULE ORAL 3 TIMES DAILY
Status: DISCONTINUED | OUTPATIENT
Start: 2023-11-30 | End: 2023-12-01 | Stop reason: HOSPADM

## 2023-11-30 RX ADMIN — SODIUM CHLORIDE, PRESERVATIVE FREE 10 ML: 5 INJECTION INTRAVENOUS at 10:05

## 2023-11-30 RX ADMIN — VANCOMYCIN HYDROCHLORIDE 1250 MG: 10 INJECTION, POWDER, LYOPHILIZED, FOR SOLUTION INTRAVENOUS at 17:36

## 2023-11-30 RX ADMIN — CEFEPIME 2000 MG: 2 INJECTION, POWDER, FOR SOLUTION INTRAVENOUS at 03:37

## 2023-11-30 RX ADMIN — VANCOMYCIN HYDROCHLORIDE 1250 MG: 10 INJECTION, POWDER, LYOPHILIZED, FOR SOLUTION INTRAVENOUS at 05:31

## 2023-11-30 RX ADMIN — HYDROMORPHONE HYDROCHLORIDE 1 MG: 1 INJECTION, SOLUTION INTRAMUSCULAR; INTRAVENOUS; SUBCUTANEOUS at 11:35

## 2023-11-30 RX ADMIN — METHOCARBAMOL TABLETS 500 MG: 500 TABLET, COATED ORAL at 10:04

## 2023-11-30 RX ADMIN — ENOXAPARIN SODIUM 40 MG: 100 INJECTION SUBCUTANEOUS at 10:04

## 2023-11-30 RX ADMIN — HYDROMORPHONE HYDROCHLORIDE 1 MG: 1 INJECTION, SOLUTION INTRAMUSCULAR; INTRAVENOUS; SUBCUTANEOUS at 17:33

## 2023-11-30 RX ADMIN — SODIUM CHLORIDE, PRESERVATIVE FREE 10 ML: 5 INJECTION INTRAVENOUS at 21:33

## 2023-11-30 RX ADMIN — SUCRALFATE 1 G: 1 TABLET ORAL at 10:04

## 2023-11-30 RX ADMIN — HYDROMORPHONE HYDROCHLORIDE 1 MG: 1 INJECTION, SOLUTION INTRAMUSCULAR; INTRAVENOUS; SUBCUTANEOUS at 05:26

## 2023-11-30 RX ADMIN — SUCRALFATE 1 G: 1 TABLET ORAL at 21:31

## 2023-11-30 RX ADMIN — CEFEPIME 2000 MG: 2 INJECTION, POWDER, FOR SOLUTION INTRAVENOUS at 11:34

## 2023-11-30 RX ADMIN — CEFEPIME 2000 MG: 2 INJECTION, POWDER, FOR SOLUTION INTRAVENOUS at 21:31

## 2023-11-30 RX ADMIN — MELATONIN 3 MG: at 21:31

## 2023-11-30 RX ADMIN — METHOCARBAMOL TABLETS 500 MG: 500 TABLET, COATED ORAL at 21:31

## 2023-11-30 RX ADMIN — GABAPENTIN 100 MG: 100 CAPSULE ORAL at 21:32

## 2023-11-30 RX ADMIN — METHOCARBAMOL TABLETS 500 MG: 500 TABLET, COATED ORAL at 15:00

## 2023-11-30 RX ADMIN — KETOROLAC TROMETHAMINE 30 MG: 30 INJECTION, SOLUTION INTRAMUSCULAR; INTRAVENOUS at 03:09

## 2023-11-30 RX ADMIN — HYDROMORPHONE HYDROCHLORIDE 1 MG: 1 INJECTION, SOLUTION INTRAMUSCULAR; INTRAVENOUS; SUBCUTANEOUS at 23:34

## 2023-11-30 RX ADMIN — GABAPENTIN 100 MG: 100 CAPSULE ORAL at 17:34

## 2023-11-30 RX ADMIN — PANTOPRAZOLE SODIUM 40 MG: 40 TABLET, DELAYED RELEASE ORAL at 10:04

## 2023-11-30 RX ADMIN — SUCRALFATE 1 G: 1 TABLET ORAL at 15:00

## 2023-11-30 ASSESSMENT — PAIN DESCRIPTION - DIRECTION
RADIATING_TOWARDS: LOWER

## 2023-11-30 ASSESSMENT — PAIN DESCRIPTION - FREQUENCY
FREQUENCY: CONTINUOUS

## 2023-11-30 ASSESSMENT — PAIN SCALES - GENERAL
PAINLEVEL_OUTOF10: 9
PAINLEVEL_OUTOF10: 9
PAINLEVEL_OUTOF10: 7
PAINLEVEL_OUTOF10: 7
PAINLEVEL_OUTOF10: 9
PAINLEVEL_OUTOF10: 9
PAINLEVEL_OUTOF10: 7
PAINLEVEL_OUTOF10: 7
PAINLEVEL_OUTOF10: 9
PAINLEVEL_OUTOF10: 0
PAINLEVEL_OUTOF10: 10
PAINLEVEL_OUTOF10: 9

## 2023-11-30 ASSESSMENT — PAIN DESCRIPTION - LOCATION
LOCATION: BACK

## 2023-11-30 ASSESSMENT — PAIN DESCRIPTION - PAIN TYPE
TYPE: CHRONIC PAIN

## 2023-11-30 ASSESSMENT — PAIN - FUNCTIONAL ASSESSMENT
PAIN_FUNCTIONAL_ASSESSMENT: PREVENTS OR INTERFERES SOME ACTIVE ACTIVITIES AND ADLS
PAIN_FUNCTIONAL_ASSESSMENT: ACTIVITIES ARE NOT PREVENTED
PAIN_FUNCTIONAL_ASSESSMENT: PREVENTS OR INTERFERES SOME ACTIVE ACTIVITIES AND ADLS
PAIN_FUNCTIONAL_ASSESSMENT: PREVENTS OR INTERFERES SOME ACTIVE ACTIVITIES AND ADLS
PAIN_FUNCTIONAL_ASSESSMENT: ACTIVITIES ARE NOT PREVENTED
PAIN_FUNCTIONAL_ASSESSMENT: ACTIVITIES ARE NOT PREVENTED
PAIN_FUNCTIONAL_ASSESSMENT: PREVENTS OR INTERFERES SOME ACTIVE ACTIVITIES AND ADLS
PAIN_FUNCTIONAL_ASSESSMENT: PREVENTS OR INTERFERES SOME ACTIVE ACTIVITIES AND ADLS

## 2023-11-30 ASSESSMENT — PAIN DESCRIPTION - DESCRIPTORS
DESCRIPTORS: ACHING

## 2023-11-30 ASSESSMENT — PAIN DESCRIPTION - ORIENTATION
ORIENTATION: ANTERIOR
ORIENTATION: LOWER

## 2023-11-30 ASSESSMENT — PAIN DESCRIPTION - ONSET
ONSET: GRADUAL

## 2023-11-30 NOTE — PROGRESS NOTES
Hospitalist Progress Note    NAME:   Rui Damian   : 1980   MRN: [id number]     Date/Time: 2023 4:05 PM  Patient PCP: None, None    Estimated discharge date: ?? Ready medically now  Barriers: Hospital transfer vs SNF. Pt can't get PICC so options limited. 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-  H/o back surgery by [facility] spine department in past  Initial blood cultures- neg x 4 days now     Cont IV Abx- Cont Vancomycin + s/p Zosyn->changed to cefepime  per ID recc  Infectious disease consulted-  rec Vancomycin 1.25 g  IV every 12 hours end date   -Cefepime 2 g IV every 8 hours end date   Pt not a good candidate for IV PICC Line as pt used it actively for abuse at [facility] last time-- left AMA  Will need IV access in hospital setting for IV Abx as recommended if PICC not able to be placed - limiting her options where pt can be transferred (as pt is not in network her at [facility] as per CM)  -Weekly CBC, CMP, vancomycin trough & ESR/CRP every other week  -ID follow-up - at 3 PM  IP Spine surgery consult placed- no surgery recc by neurosurgery- cont IV abx for now  Pain management- toradol added prn, cont current Fentanyl patch and Dilaudid ordered-- no escalation of Opioids recommended and no PO oxy as pt used her crushed oxy via Injection from [facility] can to abuse it in hospital [facility])- PICC team declined placement.  Adding trial of low dose gabapentin        Hypomagnesemia- resolved  replenished     Hypokalemia- resolved    Medical Decision Making:   I personally reviewed labs: CBC, BMP  I personally reviewed imaging:  I personally reviewed EKG:  Toxic drug monitoring:   Discussed case with:         Code Status: FULL  DVT Prophylaxis: Lovenox  GI Prophylaxis:    Subjective: 11/28/23  0542   WBC 6.9   HGB 12.5   HCT 36.0        Recent Labs     11/28/23  0542 11/29/23  0452 11/30/23  0333   * 134* 137   K 4.0 4.6 3.9    104 103   CO2 29 25 28   GLUCOSE 117* 90 85   BUN 12 14 15   CREATININE 0.73 0.69 0.71   CALCIUM 9.2 9.1 9.1       Signed: Caterina Acosta MD

## 2023-11-30 NOTE — PLAN OF CARE
function  11/30/2023 1037 by Danny Chandra RN  Outcome: Progressing  11/30/2023 1037 by Danny Chandra RN  Outcome: Progressing  11/30/2023 0110 by Gisele Ayala RN  Outcome: Progressing

## 2023-11-30 NOTE — PLAN OF CARE
Problem: Discharge Planning  Goal: Discharge to home or other facility with appropriate resources  11/30/2023 0110 by Rosa Wilkerson RN  Outcome: Progressing  11/29/2023 1140 by Rahul Rodriguez RN  Outcome: Progressing  11/29/2023 1122 by Rahul Rodriguez RN  Outcome: Progressing  11/29/2023 1117 by Rahul Rodriguez RN  Outcome: Progressing  Flowsheets (Taken 11/29/2023 0830)  Discharge to home or other facility with appropriate resources: Identify barriers to discharge with patient and caregiver     Problem: Pain  Goal: Verbalizes/displays adequate comfort level or baseline comfort level  11/30/2023 0110 by Rosa Wilkerson RN  Outcome: Progressing  11/29/2023 1140 by Rahul Rodriguez RN  Outcome: Progressing  11/29/2023 1122 by Rauhl Rodriguez RN  Outcome: Progressing  11/29/2023 1117 by Rahul Rodriguez RN  Outcome: Progressing     Problem: Safety - Adult  Goal: Free from fall injury  11/30/2023 0110 by Rosa Wilkerson RN  Outcome: Progressing  11/29/2023 1140 by Rahul Rodriguez RN  Outcome: Progressing  11/29/2023 1122 by Rahul Rodriguez RN  Outcome: Progressing  11/29/2023 1117 by Rahul Rodriguez RN  Outcome: Progressing     Problem: Skin/Tissue Integrity  Goal: Absence of new skin breakdown  Description: 1. Monitor for areas of redness and/or skin breakdown  2. Assess vascular access sites hourly  3. Every 4-6 hours minimum:  Change oxygen saturation probe site  4. Every 4-6 hours:  If on nasal continuous positive airway pressure, respiratory therapy assess nares and determine need for appliance change or resting period.   11/30/2023 0110 by Rosa Wilkerson RN  Outcome: Progressing  11/29/2023 1140 by Rahul Rodriguez RN  Outcome: Progressing  11/29/2023 1122 by Rahul Rodriguez RN  Outcome: Progressing  11/29/2023 1117 by Rahul Rodriguez RN  Outcome: Progressing     Problem: Musculoskeletal - Adult  Goal: Return mobility to safest level of function  11/30/2023 0110 by Rosa Wilkerson RN  Outcome: 11/29/2023 1559 by Jacqui Carmen PT  Outcome: Progressing

## 2023-12-01 VITALS
HEART RATE: 81 BPM | TEMPERATURE: 98.4 F | DIASTOLIC BLOOD PRESSURE: 86 MMHG | RESPIRATION RATE: 16 BRPM | OXYGEN SATURATION: 96 % | BODY MASS INDEX: 26.63 KG/M2 | WEIGHT: 170 LBS | SYSTOLIC BLOOD PRESSURE: 148 MMHG

## 2023-12-01 LAB
ANION GAP SERPL CALC-SCNC: 6 MMOL/L (ref 5–15)
BACTERIA SPEC CULT: NORMAL
BACTERIA SPEC CULT: NORMAL
BUN SERPL-MCNC: 10 MG/DL (ref 6–20)
BUN/CREAT SERPL: 16 (ref 12–20)
CALCIUM SERPL-MCNC: 9.4 MG/DL (ref 8.5–10.1)
CHLORIDE SERPL-SCNC: 100 MMOL/L (ref 97–108)
CO2 SERPL-SCNC: 30 MMOL/L (ref 21–32)
CREAT SERPL-MCNC: 0.62 MG/DL (ref 0.55–1.02)
GLUCOSE SERPL-MCNC: 109 MG/DL (ref 65–100)
POTASSIUM SERPL-SCNC: 3.7 MMOL/L (ref 3.5–5.1)
SERVICE CMNT-IMP: NORMAL
SERVICE CMNT-IMP: NORMAL
SODIUM SERPL-SCNC: 136 MMOL/L (ref 136–145)

## 2023-12-01 PROCEDURE — 2580000003 HC RX 258: Performed by: INTERNAL MEDICINE

## 2023-12-01 PROCEDURE — 80048 BASIC METABOLIC PNL TOTAL CA: CPT

## 2023-12-01 PROCEDURE — 6360000002 HC RX W HCPCS: Performed by: INTERNAL MEDICINE

## 2023-12-01 PROCEDURE — 2500000003 HC RX 250 WO HCPCS: Performed by: INTERNAL MEDICINE

## 2023-12-01 PROCEDURE — 6370000000 HC RX 637 (ALT 250 FOR IP): Performed by: INTERNAL MEDICINE

## 2023-12-01 PROCEDURE — 36415 COLL VENOUS BLD VENIPUNCTURE: CPT

## 2023-12-01 PROCEDURE — 6370000000 HC RX 637 (ALT 250 FOR IP): Performed by: STUDENT IN AN ORGANIZED HEALTH CARE EDUCATION/TRAINING PROGRAM

## 2023-12-01 PROCEDURE — 6360000002 HC RX W HCPCS: Performed by: STUDENT IN AN ORGANIZED HEALTH CARE EDUCATION/TRAINING PROGRAM

## 2023-12-01 PROCEDURE — 2580000003 HC RX 258: Performed by: STUDENT IN AN ORGANIZED HEALTH CARE EDUCATION/TRAINING PROGRAM

## 2023-12-01 RX ADMIN — CEFEPIME 2000 MG: 2 INJECTION, POWDER, FOR SOLUTION INTRAVENOUS at 11:40

## 2023-12-01 RX ADMIN — PANTOPRAZOLE SODIUM 40 MG: 40 TABLET, DELAYED RELEASE ORAL at 09:54

## 2023-12-01 RX ADMIN — GABAPENTIN 100 MG: 100 CAPSULE ORAL at 09:54

## 2023-12-01 RX ADMIN — SUCRALFATE 1 G: 1 TABLET ORAL at 09:54

## 2023-12-01 RX ADMIN — ONDANSETRON 4 MG: 2 INJECTION INTRAMUSCULAR; INTRAVENOUS at 11:40

## 2023-12-01 RX ADMIN — METHOCARBAMOL TABLETS 500 MG: 500 TABLET, COATED ORAL at 09:54

## 2023-12-01 RX ADMIN — SODIUM CHLORIDE, PRESERVATIVE FREE 10 ML: 5 INJECTION INTRAVENOUS at 09:56

## 2023-12-01 RX ADMIN — HYDROMORPHONE HYDROCHLORIDE 1 MG: 1 INJECTION, SOLUTION INTRAMUSCULAR; INTRAVENOUS; SUBCUTANEOUS at 11:00

## 2023-12-01 RX ADMIN — SODIUM CHLORIDE: 9 INJECTION, SOLUTION INTRAVENOUS at 05:33

## 2023-12-01 RX ADMIN — VANCOMYCIN HYDROCHLORIDE 1250 MG: 10 INJECTION, POWDER, LYOPHILIZED, FOR SOLUTION INTRAVENOUS at 05:35

## 2023-12-01 RX ADMIN — CEFEPIME 2000 MG: 2 INJECTION, POWDER, FOR SOLUTION INTRAVENOUS at 05:23

## 2023-12-01 RX ADMIN — HYDROMORPHONE HYDROCHLORIDE 1 MG: 1 INJECTION, SOLUTION INTRAMUSCULAR; INTRAVENOUS; SUBCUTANEOUS at 05:19

## 2023-12-01 RX ADMIN — SODIUM CHLORIDE 20 ML/HR: 9 INJECTION, SOLUTION INTRAVENOUS at 05:22

## 2023-12-01 RX ADMIN — ENOXAPARIN SODIUM 40 MG: 100 INJECTION SUBCUTANEOUS at 09:54

## 2023-12-01 ASSESSMENT — PAIN DESCRIPTION - ORIENTATION
ORIENTATION: LOWER
ORIENTATION: LEFT
ORIENTATION: LEFT
ORIENTATION: LOWER

## 2023-12-01 ASSESSMENT — PAIN SCALES - GENERAL
PAINLEVEL_OUTOF10: 9
PAINLEVEL_OUTOF10: 6

## 2023-12-01 ASSESSMENT — PAIN DESCRIPTION - DESCRIPTORS
DESCRIPTORS: ACHING

## 2023-12-01 ASSESSMENT — PAIN - FUNCTIONAL ASSESSMENT
PAIN_FUNCTIONAL_ASSESSMENT: PREVENTS OR INTERFERES SOME ACTIVE ACTIVITIES AND ADLS
PAIN_FUNCTIONAL_ASSESSMENT: PREVENTS OR INTERFERES SOME ACTIVE ACTIVITIES AND ADLS

## 2023-12-01 ASSESSMENT — PAIN DESCRIPTION - PAIN TYPE: TYPE: CHRONIC PAIN

## 2023-12-01 ASSESSMENT — PAIN DESCRIPTION - LOCATION
LOCATION: BACK

## 2023-12-01 NOTE — PROGRESS NOTES
Spiritual Care Assessment/Progress Note  Sarah    Name: Monique Fajardo MRN: [id number]    Age: 37 y.o. Sex: female   Language: English     Date: 12/1/2023            Total Time Calculated: 26 min              Spiritual Assessment begun in [facility] 3 SURG TELE  Service Provided For[de-identified] Patient  Referral/Consult From[de-identified] Rounding  Encounter Overview/Reason : Initial Encounter    Spiritual beliefs:      [x] Involved in a madhav tradition/spiritual practice:      [] Supported by a madhav community:      [] Claims no spiritual orientation:      [] Seeking spiritual identity:           [] Adheres to an individual form of spirituality:      [] Not able to assess:                Identified resources for coping and support system:   Support System: Family members       [] Prayer                  [] Devotional reading               [] Music                  [] Guided Imagery     [] Pet visits                                        [] Other: (COMMENT)     Specific area/focus of visit   Encounter:    Crisis:    Spiritual/Emotional needs: Type: Spiritual Support  Ritual, Rites and Sacraments:    Grief, Loss, and Adjustments: Type: Life Adjustments  Ethics/Mediation:    Behavioral Health:    Palliative Care: Advance Care Planning:      Plan/Referrals: No future visits requested    Narrative:   Visit was in [phone] for initial spiritual assessment. Pt was in bed and appeared comfortable. She received visit calmly and engaged in conversation and life review. She shared what led to her hospitalization and other concerns. She is being transferred to another facility. Her support system is limited, but her mom is a great motivation. She also finds strength in her madhav, though she hasn't been active for a period.  offered supportive listening presence, affirming and validating thoughts and feelings. Words of encouragement spoken, patient requested for a Bible which was provided.  She was grateful for the support. Visited by: Ana Funk.   [facility] Paging Service: [phone] ([phone])

## 2023-12-01 NOTE — PROGRESS NOTES
End of Shift Note    Bedside shift change report given to JOSETTE Galarza (oncoming nurse) by Minus KENNEDY Lucia (offgoing nurse). Report included the following information SBAR, Kardex, Intake/Output, MAR, and Recent Results    Shift worked:  6534-3905     Shift summary and any significant changes:       No changes overnight.    Concerns for physician to address:       Zone phone for oncoming shift:              Length of Stay:  Expected LOS: 4  Actual LOS: 6      Minus Rea RN

## 2023-12-01 NOTE — CARE COORDINATION
Transition of Care Plan:     RUR: 14%  Prior Level of Functioning:   Disposition: transfer to [facility] facility-[facility]  If SNF or IPR: Date FOC offered:   Date FOC received:   Accepting facility:   Date authorization started with reference number:   Date authorization received and expires: Follow up appointments:   DME needed: n/a  Transportation at discharge: [email or web address]  IM/IMM Medicare/ letter given: n/a  Is patient a  and connected with VA? If yes, was [employer] transfer form completed and [facility] notified? Caregiver Contact:   Discharge Caregiver contacted prior to discharge? Care Conference needed? Barriers to discharge:  TUYET pt. Needs to transfer to [facility] to finish IV ABX    11:24  CM phoned BS transfer center to get an update on transport. Transport is now expected at 12p. CM will inform RN    CM f/u w/BS transfer center & was notified pt has a bed at [facility] and will be leaving at [address] [facility] was pt's preference. CM confirmed RN is aware.     Felicita Romo  Ext [phone]

## 2023-12-01 NOTE — PROGRESS NOTES
Infectious Disease Progress      Impression    Lumbar osteomyelitis/ discitis-recurrent  CT abd/ pelvis+ progression of chronic discitis osteomyelitis at   L4-L5 ,may involve the right L5 interpedicular screw. No evidence of abscess. H/o Surgery at [facility]. H/o Acute discitis L4/5- admitted 9/2023  MRI 9/1+ for acute discitis at L4/5 no drainable fluid collection  Or epidural abscess. Unable to aspirate per IR. Improving  Pt was Dc on Vanc/ Cefepime IV x 6 weeks  Planned end date 10/15. S/p AMA from [facility]. Per DC note from [facility] indicates patient  Had recurrent drug use and admitted there. Restarted on cefepime IV      H/o GPC bacteremia  Blood cultures 9/1 + GPC1/2  Final ID pending, sent to Ref Lab  GPR on aerobic & anaerobic susceptibility  ? Contaminant  Microbacterium sp- PCN & vanc sensitive  Repeat cultures 9/3-no growth     H/o \"spine\" infection and bacteremia per pt  Treated with vancomycin IV x6 weeks  At [facility]  Records reviewed. Diagnosis was Pyelonephritis. No culture reports     H/o hepatitis C and cirrhosis  HCV RNA- undetectable,   Hep B serology negative,   HIV negative     H/o IVDU  Recent use per DC note from [facility]  Toxicology screen not done this admission       Edentulous     Tobacco use  Cessation advised        Depression and anxiety  On Seroquel    ESR 72, CRP 12.1. Plan  Continue with vancomycin and cefepime IV  Pharmacy to dose per creatinine clearance please  Patient for 6 weeks of antibiotic therapy  Adequate fluids, daily probiotic.     May DC from ID standpoint    Antimicrobial orders for discharge  -Vancomycin 1.25 g  IV every 12 hours end date 1/6  -Cefepime 2 g IV every 8 hours end date 1/6  -Pull line at end of therapy.    -Weekly CBC, CMP, vancomycin trough & ESR/CRP every other week-  -Fax reports to [phone], call with critical labs  at [phone]  -Encourage adequate fluids, daily probiotic/yogurt  -If line malfunction occurs and home health Endocrine: Negative for high or low blood sugars, heat or cold intolerance    Psych: Negative for manic behavior     Vitals:    12/01/23 0004   BP: (!) 160/101   Pulse: 75   Resp:    Temp:    SpO2:            PHYSICAL EXAM:  General:          WD, WN. Alert, cooperative, no acute distress    EENT:              EOMI. Anicteric sclerae. MMM  Resp:               CTA bilaterally, no wheezing or rales. No accessory muscle use  CV:                  Regular  rhythm,  No edema  GI:                   Soft, Non distended, Non tender. +Bowel sounds  Neurologic:      Alert and oriented X 3, normal speech,   Psych:             Good insight. Not anxious nor agitated  Skin:                No rashes. No jaundice.   Extremities_no edema    Recent Results (from the past 24 hour(s))   Basic Metabolic Panel    Collection Time: 11/30/23  3:33 AM   Result Value Ref Range    Sodium 137 136 - 145 mmol/L    Potassium 3.9 3.5 - 5.1 mmol/L    Chloride 103 97 - 108 mmol/L    CO2 28 21 - 32 mmol/L    Anion Gap 6 5 - 15 mmol/L    Glucose 85 65 - 100 mg/dL    BUN 15 6 - 20 MG/DL    Creatinine 0.71 0.55 - 1.02 MG/DL    Bun/Cre Ratio 21 (H) 12 - 20      Est, Glom Filt Rate >60 >60 ml/min/1.73m2    Calcium 9.1 8.5 - 10.1 MG/DL   Vancomycin Level, Random    Collection Time: 11/30/23  3:33 AM   Result Value Ref Range    Vancomycin Rm 21.3 UG/ML       Results       Procedure Component Value Units Date/Time    Culture, Blood 2 [[id number]] Collected: 11/25/23 1705    Order Status: Completed Specimen: Blood Updated: 11/30/23 0720     Special Requests NO SPECIAL REQUESTS        Culture NO GROWTH 5 DAYS       Culture, Blood 1 [[id number]] Collected: 11/25/23 1650    Order Status: Completed Specimen: Blood Updated: 11/30/23 0720     Special Requests NO SPECIAL REQUESTS        Culture NO GROWTH 5 DAYS       Blood Culture 1 [[id number]]     Order Status: Canceled Specimen: Blood     Blood Culture 2 [[id number]]     Order Status: Canceled Specimen: Blood

## 2023-12-01 NOTE — DISCHARGE SUMMARY
Discharge Summary    Name: Vandana Almanzar  [id number]  YOB: 1980 (Age: 37 y.o.)   Date of Admission: 11/25/2023  Date of Discharge: 12/1/2023  Attending Physician: Mishel Gonzales MD    Discharge Diagnosis:     Recurrent Lumbar osteomyelitis/discitis POA-   Likely due to recurrent IVDAbuse with non compliance medical(left AMA last from [facility] without completing Abx therapy)  ? 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-  H/o back surgery by [facility] spine department in past  Hypomagnesemia  Hypokalemia    Consultations:  [address] [place] [address] CONSULT TO NEUROSURGERY  IP CONSULT TO INFECTIOUS DISEASES  IP CONSULT TO CASE MANAGEMENT  IP CONSULT TO CASE MANAGEMENT      Brief Admission History/Reason for Admission Per Gerard Limon MD:     Vandana Almanzar is a 37 y.o.  female with PMHx significant for multiple comorbidities presents to [facility] with complaints of back pain. Of note patient has been treated for vertebral osteomyelitis on and off over the past few months, unclear whether has completed treatment. Patient states over the past 4 days she has been having sudden onset severe lower back pain, sharp in intensity, aggravated with movement, minimal alleviating factors, following which patient presented to the ED. Patient endorses having subjective fevers as well as chills. Patient denies nausea vomiting lightheadedness dizziness dyspnea orthopnea paroxysmal nocturnal dyspnea chest pain palpitations headache focal weakness loss sensation auditory or visual symptoms abdominal stool or urinary complaints or any other associated symptoms. Patient endorses no recent sick contacts or travel activity  We were asked to admit for work up and evaluation of the above problems.     Brief Hospital Course by Main Problems:     Recurrent Lumbar osteomyelitis/discitis POA-   Likely due to recurrent IVDAbuse with non compliance

## 2023-12-30 LAB
AEROBIC ID BY SEQUENCING: NORMAL
SPECIMEN SOURCE: NORMAL

## 2024-01-03 LAB
BACTERIA ISLT: ABNORMAL
SOURCE: ABNORMAL

## 2024-01-16 ENCOUNTER — TELEPHONE (OUTPATIENT)
Age: 44
End: 2024-01-16